# Patient Record
Sex: FEMALE | Race: WHITE | Employment: OTHER | URBAN - METROPOLITAN AREA
[De-identification: names, ages, dates, MRNs, and addresses within clinical notes are randomized per-mention and may not be internally consistent; named-entity substitution may affect disease eponyms.]

---

## 2017-04-10 RX ORDER — LOSARTAN POTASSIUM AND HYDROCHLOROTHIAZIDE 25; 100 MG/1; MG/1
TABLET ORAL
Qty: 90 TAB | Refills: 0 | Status: SHIPPED | OUTPATIENT
Start: 2017-04-10 | End: 2017-04-21

## 2017-04-10 RX ORDER — AMLODIPINE BESYLATE 2.5 MG/1
TABLET ORAL
Qty: 90 TAB | Refills: 0 | Status: SHIPPED | OUTPATIENT
Start: 2017-04-10 | End: 2017-04-21 | Stop reason: SDUPTHER

## 2017-04-21 ENCOUNTER — OFFICE VISIT (OUTPATIENT)
Dept: INTERNAL MEDICINE CLINIC | Age: 67
End: 2017-04-21

## 2017-04-21 VITALS
WEIGHT: 166.6 LBS | OXYGEN SATURATION: 99 % | DIASTOLIC BLOOD PRESSURE: 63 MMHG | HEART RATE: 74 BPM | HEIGHT: 63 IN | SYSTOLIC BLOOD PRESSURE: 130 MMHG | RESPIRATION RATE: 18 BRPM | BODY MASS INDEX: 29.52 KG/M2 | TEMPERATURE: 97.9 F

## 2017-04-21 DIAGNOSIS — J45.901 UNSPECIFIED ASTHMA, WITH EXACERBATION: ICD-10-CM

## 2017-04-21 DIAGNOSIS — Z00.00 PHYSICAL EXAM, ANNUAL: Primary | ICD-10-CM

## 2017-04-21 RX ORDER — LOSARTAN POTASSIUM AND HYDROCHLOROTHIAZIDE 25; 100 MG/1; MG/1
TABLET ORAL
Qty: 30 TAB | Refills: 6 | Status: CANCELLED | OUTPATIENT
Start: 2017-04-21

## 2017-04-21 RX ORDER — MONTELUKAST SODIUM 10 MG/1
TABLET ORAL
Qty: 90 TAB | Refills: 3 | Status: SHIPPED | OUTPATIENT
Start: 2017-04-21 | End: 2018-04-23 | Stop reason: SDUPTHER

## 2017-04-21 RX ORDER — ALBUTEROL SULFATE 90 UG/1
AEROSOL, METERED RESPIRATORY (INHALATION)
Qty: 3 INHALER | Refills: 3 | Status: SHIPPED | OUTPATIENT
Start: 2017-04-21 | End: 2018-01-05 | Stop reason: SDUPTHER

## 2017-04-21 RX ORDER — LOSARTAN POTASSIUM AND HYDROCHLOROTHIAZIDE 25; 100 MG/1; MG/1
TABLET ORAL
Qty: 90 TAB | Refills: 1 | Status: SHIPPED | OUTPATIENT
Start: 2017-04-21 | End: 2017-10-23

## 2017-04-21 RX ORDER — AMLODIPINE BESYLATE 2.5 MG/1
TABLET ORAL
Qty: 90 TAB | Refills: 1 | Status: SHIPPED | OUTPATIENT
Start: 2017-04-21 | End: 2018-04-23 | Stop reason: SDUPTHER

## 2017-04-21 RX ORDER — ALBUTEROL SULFATE 0.83 MG/ML
2.5 SOLUTION RESPIRATORY (INHALATION)
Qty: 1 PACKAGE | Refills: 11 | Status: SHIPPED | OUTPATIENT
Start: 2017-04-21 | End: 2020-12-04 | Stop reason: SDUPTHER

## 2017-04-21 NOTE — MR AVS SNAPSHOT
Visit Information Date & Time Provider Department Dept. Phone Encounter #  
 4/21/2017  2:30 PM Farhan Bojorquez, [address] [phone] [id number] Follow-up Instructions Return in about 6 months (around 10/21/2017). Upcoming Health Maintenance Date Due OSTEOPOROSIS SCREENING (DEXA) 9/13/2016 MEDICARE YEARLY EXAM 9/13/2016 Pneumococcal 65+ Low/Medium Risk (2 of 2 - PPSV23) 10/17/2017 BREAST CANCER SCRN MAMMOGRAM 10/28/2017 GLAUCOMA SCREENING Q2Y 10/24/2018 DTaP/Tdap/Td series (2 - Td) 5/13/2023 COLONOSCOPY 12/22/2026 Allergies as of 4/21/2017  Review Complete On: 4/21/2017 By: Milan Phoenix Severity Noted Reaction Type Reactions Acetylcarnitine Medium 04/29/2016    Swelling Ace Inhibitors  10/10/2012   Side Effect Cough Aleve [Naproxen Sodium]  12/09/2009    Hives Daypro [Oxaprozin]  12/09/2009    Rash Effexor [Venlafaxine]  06/27/2011    Other (comments) Abdominal pain Prednisone  10/26/2010    Swelling Face swelling ONLY with large dosage Shellfish Containing Products  12/09/2009    Swelling Throat and tounge Current Immunizations  Reviewed on 10/22/2013 Name Date Influenza Vaccine 10/17/2016, 10/18/2015, 10/20/2013 Influenza Vaccine PF 9/16/2014 Influenza Vaccine Split 10/10/2011 Influenza Vaccine Whole 10/11/2010 Pneumococcal Conjugate (PCV-13) 10/17/2016 Pneumococcal Polysaccharide (PPSV-23) 1/1/2010 Pneumococcal Vaccine (Unspecified Type) 10/26/2010 Tdap 5/13/2013 Zoster Vaccine, Live 9/25/2015 Not reviewed this visit You Were Diagnosed With   
  
 Codes Comments Physical exam, annual    -  Primary ICD-10-CM: Z00.00 ICD-9-CM: V70.0 Unspecified asthma, with exacerbation     ICD-10-CM: J45.901 ICD-9-CM: 252.64 Vitals BP Pulse Temp Resp Height(growth percentile) Weight(growth percentile) 130/63 (BP 1 Location: Right arm, BP Patient Position: Sitting) 74 97.9 °F (36.6 °C) (Oral) 18 5' 2.5\" (1.588 m) 166 lb 9.6 oz (75.6 kg) SpO2 BMI OB Status Smoking Status 99% 29.99 kg/m2 Postmenopausal Never Smoker BMI and BSA Data Body Mass Index Body Surface Area  
 29.99 kg/m 2 1.83 m 2 Preferred Pharmacy Pharmacy Name Phone [facility] [address] [phone] Your Updated Medication List  
  
   
This list is accurate as of: 4/21/17  3:22 PM.  Always use your most recent med list.  
  
  
  
  
 * albuterol 90 mcg/actuation inhaler Commonly known as:  PROAIR HFA INHALE 2 PUFFS BY MOUTH EVERY 4 HOURS AS NEEDED FOR WHEEZING  
  
 * albuterol 2.5 mg /3 mL (0.083 %) nebulizer solution Commonly known as:  PROVENTIL VENTOLIN  
3 mL by Nebulization route every four (4) hours as needed for Wheezing. amLODIPine 2.5 mg tablet Commonly known as:  NORVASC  
take 1 tablet by mouth once daily * losartan-hydroCHLOROthiazide 100-25 mg per tablet Commonly known as:  HYZAAR  
take 1 tablet by mouth daily * losartan-hydroCHLOROthiazide 100-25 mg per tablet Commonly known as:  HYZAAR  
take 1 tablet by mouth once daily  
  
 mometasone 220 mcg (60 doses) inhaler Commonly known as:  ASMANEX TWISTHALER  
1 cap by inhalation daily. May use 1 cap bid during asthma flares. montelukast 10 mg tablet Commonly known as:  SINGULAIR  
take 1 tablet by mouth once daily Nebulizer & Compressor machine 1 Each by Does Not Apply route every four (4) hours as needed (wheezing). * Notice: This list has 4 medication(s) that are the same as other medications prescribed for you. Read the directions carefully, and ask your doctor or other care provider to review them with you. Prescriptions Sent to Pharmacy  Refills  
 mometasone (ASMANEX TWISTHALER) 220 mcg (60 doses) inhaler 6  
 Si cap by inhalation daily. May use 1 cap bid during asthma flares. Class: Normal  
 Pharmacy: [address] Ph #: [phone]  
 amLODIPine (NORVASC) 2.5 mg tablet 1 Sig: take 1 tablet by mouth once daily Class: Normal  
 Pharmacy: [address] [place] [address] Ph #: [phone]  
 losartan-hydroCHLOROthiazide (HYZAAR) 100-25 mg per tablet 1 Sig: take 1 tablet by mouth once daily Class: Normal  
 Pharmacy: [address] [place] [address] Ph #: [phone]  
 albuterol (PROAIR HFA) 90 mcg/actuation inhaler 3 Sig: INHALE 2 PUFFS BY MOUTH EVERY 4 HOURS AS NEEDED FOR WHEEZING Class: Normal  
 Pharmacy: [address] [place] [address] Ph #: [phone]  
 albuterol (PROVENTIL VENTOLIN) 2.5 mg /3 mL (0.083 %) nebulizer solution 11 Sig: 3 mL by Nebulization route every four (4) hours as needed for Wheezing. Class: Normal  
 Pharmacy: [address] Ph #: [phone] Route: Nebulization  
 montelukast (SINGULAIR) 10 mg tablet 3 Sig: take 1 tablet by mouth once daily Class: Normal  
 Pharmacy: [address] Ph #: [phone] We Performed the Following CBC W/O DIFF [90459 CPT(R)] HEMOGLOBIN A1C WITH EAG [92631 CPT(R)] LIPID PANEL [64333 CPT(R)] METABOLIC PANEL, COMPREHENSIVE [05654 CPT(R)] TSH 3RD GENERATION [84961 CPT(R)] VITAMIN D, 25 HYDROXY [id number] CPT(R)] Follow-up Instructions Return in about 6 months (around 10/21/2017). To-Do List   
 2017 Imaging:  DEXA BONE DENSITY STUDY AXIAL   
  
 2017 Imaging:  JAVIER MAMMO BI SCREENING INCL CAD Referral Information Referral ID Referred By Referred To [id number] [facility] Not Available Visits Status Start Date End Date 1 New Request 4/21/17 4/21/18 If your referral has a status of pending review or denied, additional information will be sent to support the outcome of this decision. Patient Instructions Fasting labs Schedule mammogram and dexa test 
 
  
Introducing \[facility]\"" & [facility] SERVICES! Dear Myra Hood: Thank you for requesting a [employer] account. Our records indicate that you already have an active [employer] account. You can access your account anytime at [email or web address] Did you know that you can access your hospital and ER discharge instructions at any time in [employer]? You can also review all of your test results from your hospital stay or ER visit. Additional Information If you have questions, please visit the Frequently Asked Questions section of the [employer] website at [email or web address] Remember, [employer] is NOT to be used for urgent needs. For medical emergencies, dial 911. Now available from your iPhone and Android! Please provide this summary of care documentation to your next provider. Your primary care clinician is listed as Jovita Lombard. If you have any questions after today's visit, please call [phone].

## 2017-04-21 NOTE — PROGRESS NOTES
HISTORY OF PRESENT ILLNESS  Saul Cronin is a 73 y.o. female. HPI   Last here 10/31/16.  Pt is here to f/u on chronic conditions    BP today is 130/63  BP at home running around 130s/83-85  Continues losartan-HCTZ and norvasc daily     Reviewed last labs 10/16  Repeat labs fasting next week     Wt is up 2 lbs since last visit- overall stable  Her weight is within overweight ranges  Pt walks 30 minutes before work each morning   Discussed diet and weight loss     Pt is following with Dr. Fadumo Cooper (ortho) for her hand pain   She last saw him 2/08/17 and got injections for this pain      Continues asmanex and singular daily  No longer taking zyrtec- now using allegra which works best  She has albuterol to use prn for breathing- stable    Reviewed colonoscopy report 12/22/16 per Dr. Sourav Vazquez: one polyp, lipoma, repeat 3 years      Pt will be retiring June 2017     PREVENTIVE:    Colonoscopy: 12/22/16, Dr. Sourav Vazquez, repeat 3 years  Pap: Dr. Christie Nair, 10/28/15  Mammogram: 10/28/15 negative, due, ordered, will schedule  DEXA: due, discussed, ordered, will schedule  Tdap: 5/13/2013  Pneumovax: 10/26/2010, due 10/17  Gzleejm30: 10/18/2016  Zostavax: 9/25/2015  Flu shot: 10/18/2016  A1c: 4/16 6.1, 10/16 6.2  Eye exam: Dr. Jonathon Hatch, 11/16, floaters  Lipids: 4/16 , 4/17 ordered           Patient Active Problem List    Diagnosis Date Noted    Cyst of tendon sheath 04/09/2012    Depression, reactive 06/27/2011    HTN (hypertension) 12/14/2009    Asthma 12/14/2009    Allergic rhinitis 12/14/2009     Current Outpatient Prescriptions   Medication Sig Dispense Refill    amLODIPine (NORVASC) 2.5 mg tablet take 1 tablet by mouth once daily 90 Tab 0    montelukast (SINGULAIR) 10 mg tablet take 1 tablet by mouth once daily 90 Tab 0    albuterol (PROAIR HFA) 90 mcg/actuation inhaler INHALE 2 PUFFS BY MOUTH EVERY 4 HOURS AS NEEDED FOR WHEEZING 3 Inhaler 3    losartan-hydrochlorothiazide (HYZAAR) 100-25 mg per tablet take 1 tablet by mouth daily 30 Tab 6    mometasone (ASMANEX TWISTHALER) 220 mcg (60 doses) inhaler 1 cap by inhalation daily. May use 1 cap bid during asthma flares. 60 Cap 5    Nebulizer & Compressor machine 1 Each by Does Not Apply route every four (4) hours as needed (wheezing). 1 Each 0    losartan-hydroCHLOROthiazide (HYZAAR) 100-25 mg per tablet take 1 tablet by mouth once daily 90 Tab 0    cetirizine (ZYRTEC) 10 mg tablet Take  by mouth.  albuterol (PROVENTIL VENTOLIN) 2.5 mg /3 mL (0.083 %) nebulizer solution 3 mL by Nebulization route every four (4) hours as needed for Wheezing. 1 Package 11     Past Surgical History:   Procedure Laterality Date    HX ORTHOPAEDIC      back      Lab Results  Component Value Date/Time   WBC 8.7 10/31/2016 03:11 PM   HGB 10.8 10/31/2016 03:11 PM   HCT 33.0 10/31/2016 03:11 PM   PLATELET 248 63/23/4439 03:11 PM   MCV 89 10/31/2016 03:11 PM       Lab Results  Component Value Date/Time   Cholesterol, total 175 04/29/2016 02:21 PM   HDL Cholesterol 56 04/29/2016 02:21 PM   LDL, calculated 105 04/29/2016 02:21 PM   Triglyceride 71 04/29/2016 02:21 PM       Lab Results  Component Value Date/Time   GFR est AA 73 10/31/2016 03:11 PM   GFR est non-AA 63 10/31/2016 03:11 PM   Creatinine 0.95 10/31/2016 03:11 PM   BUN 29 10/31/2016 03:11 PM   Sodium 142 10/31/2016 03:11 PM   Potassium 3.6 10/31/2016 03:11 PM   Chloride 100 10/31/2016 03:11 PM   CO2 28 10/31/2016 03:11 PM         Review of Systems   Respiratory: Negative for shortness of breath. Cardiovascular: Negative for chest pain. Physical Exam   Constitutional: She is oriented to person, place, and time. She appears well-developed and well-nourished. No distress. HENT:   Head: Normocephalic and atraumatic. Right Ear: External ear normal.   Left Ear: External ear normal.   Mouth/Throat: Oropharynx is clear and moist. No oropharyngeal exudate.    Eyes: Conjunctivae and EOM are normal. Right eye exhibits no discharge. Left eye exhibits no discharge. Neck: Normal range of motion. Neck supple. No carotid bruits     Cardiovascular: Normal rate, regular rhythm and normal heart sounds. Exam reveals no gallop and no friction rub. No murmur heard. Pulmonary/Chest: Effort normal and breath sounds normal. No respiratory distress. She has no wheezes. She has no rales. She exhibits no tenderness. Abdominal: Soft. She exhibits no distension and no mass. There is no tenderness. There is no rebound and no guarding. Musculoskeletal: Normal range of motion. She exhibits no edema, tenderness or deformity. Lymphadenopathy:     She has no cervical adenopathy. Neurological: She is alert and oriented to person, place, and time. Coordination normal.   Skin: Skin is warm and dry. No rash noted. She is not diaphoretic. No erythema. No pallor. Psychiatric: She has a normal mood and affect. Her behavior is normal.       ASSESSMENT and PLAN    ICD-10-CM ICD-9-CM    1. Physical exam, annual    Wt stable from last year and down 10 lbs from previous years. She exercises routinely. She is due for mammogram, and DEXA, she will schedule, immunizations UTD, will do pneumovax in the fall, eye exam due in the fall, annual labs due and ordered, colonoscopy UTD. H65.54 P33.5 METABOLIC PANEL, COMPREHENSIVE      CBC W/O DIFF      HEMOGLOBIN A1C WITH EAG      LIPID PANEL      TSH 3RD GENERATION      VITAMIN D, 25 HYDROXY      JAVIER MAMMO BI SCREENING INCL CAD      DEXA BONE DENSITY STUDY AXIAL   2. Unspecified asthma, with exacerbation    Stable with asmanex J45.901 493.92 albuterol (PROVENTIL VENTOLIN) 2.5 mg /3 mL (0.083 %) nebulizer solution          Written by Sarita Camejo, as dictated by Ricardo Malcolm MD.    Current diagnosis and concerns discussed with pt at length.  Understands risks and benefits or current treatment plan and medications and accepts the treatment and medication with any possible risks.   Pt asks appropriate questions which were answered.   Pt instructed to call with any concerns or problems. This note will not be viewable in [address]

## 2017-04-25 RX ORDER — FLUTICASONE PROPIONATE 220 UG/1
1 AEROSOL, METERED RESPIRATORY (INHALATION) 2 TIMES DAILY
Qty: 1 INHALER | Refills: 3 | Status: SHIPPED | OUTPATIENT
Start: 2017-04-25 | End: 2018-04-23 | Stop reason: SDUPTHER

## 2017-07-01 RX ORDER — SCOLOPAMINE TRANSDERMAL SYSTEM 1 MG/1
1 PATCH, EXTENDED RELEASE TRANSDERMAL
Qty: 4 PATCH | Refills: 0 | Status: SHIPPED | OUTPATIENT
Start: 2017-07-01 | End: 2017-10-23

## 2017-10-23 ENCOUNTER — OFFICE VISIT (OUTPATIENT)
Dept: INTERNAL MEDICINE CLINIC | Age: 67
End: 2017-10-23

## 2017-10-23 VITALS
WEIGHT: 170 LBS | RESPIRATION RATE: 16 BRPM | BODY MASS INDEX: 30.12 KG/M2 | SYSTOLIC BLOOD PRESSURE: 138 MMHG | HEIGHT: 63 IN | TEMPERATURE: 97.8 F | OXYGEN SATURATION: 97 % | DIASTOLIC BLOOD PRESSURE: 70 MMHG | HEART RATE: 57 BPM

## 2017-10-23 DIAGNOSIS — R01.1 MURMUR, CARDIAC: ICD-10-CM

## 2017-10-23 DIAGNOSIS — Z00.00 WELCOME TO MEDICARE PREVENTIVE VISIT: ICD-10-CM

## 2017-10-23 DIAGNOSIS — I10 ESSENTIAL HYPERTENSION: Primary | ICD-10-CM

## 2017-10-23 DIAGNOSIS — J45.40 MODERATE PERSISTENT ASTHMA WITHOUT COMPLICATION: ICD-10-CM

## 2017-10-23 DIAGNOSIS — Z23 ENCOUNTER FOR IMMUNIZATION: ICD-10-CM

## 2017-10-23 DIAGNOSIS — E66.9 OBESITY (BMI 30.0-34.9): ICD-10-CM

## 2017-10-23 NOTE — PATIENT INSTRUCTIONS
Medicare Part B Preventive Services Guidelines/Limitations Date last completed and Frequency Due Date   Bone Mass Measurement  (age 72 & older, biennial) Requires diagnosis related to osteoporosis or estrogen deficiency. Biennial benefit unless patient has history of long-term glucocorticoid tx or baseline is needed because initial test was by other method Completed Recommended every 2 years Due    Cardiovascular Screening Blood Tests (every 5 years)  Total cholesterol, HDL, Triglycerides Order as a panel if possible Completed 10/16  Recommended annually now   Colorectal Cancer Screening  -Fecal occult blood test (annual)  -Flexible sigmoidoscopy (5y)  -Screening colonoscopy (10y)  -Barium Enema  Completed  12/22/16  Recommended every  years     diego 12/19   Counseling to Prevent Tobacco Use (up to 8 sessions per year)  - Counseling greater than 3 and up to 10 minutes  - Counseling greater than 10 minutes Patients must be asymptomatic of tobacco-related conditions to receive as preventive service n/a n/a   Diabetes Screening Tests (at least every 3 years, Medicare covers annually or at 6-month intervals for prediabetic patients)    Fasting blood sugar (FBS) or glucose tolerance test (GTT) Patient must be diagnosed with one of the following:  -Hypertension, Dyslipidemia, obesity, previous impaired FBS or GTT  Or any two of the following: overweight, FH of diabetes, age ? 72, history of gestational diabetes, birth of baby weighing more than 9 pounds Completed: 10/16    Recommended every 3 years for non-diabetics    Recommended every 3-6 months for Pre-Diabetics and Diabetics Due now   Diabetes Self-Management Training (DSMT) (no USPSTF recommendation) Requires referral by treating physician for patient with diabetes or renal disease. 10 hours of initial DSMT session of no less than 30 minutes each in a continuous 12-month period. 2 hours of follow-up DSMT in subsequent years.  n/a n/a   Glaucoma Screening (no USPSTF recommendation) Diabetes mellitus, family history, , age 48 or over,  American, age 72 or over Completed  11/16  Recommended annually Due 11/17   Human Immunodeficiency Virus (HIV) Screening (annually for increased risk patients)  HIV-1 and HIV-2 by EIA, LOGAN, rapid antibody test, or oral mucosa transudate Patient must be at increased risk for HIV infection per USPSTF guidelines or pregnant. Tests covered annually for patients at increased risk. Pregnant patients may receive up to 3 test during pregnancy. n/a    Medical Nutrition Therapy (MNT) (for diabetes or renal disease not recommended schedule) Requires referral by treating physician for patient with diabetes or renal disease. Can be provided in same year as diabetes self-management training (DSMT), and CMS recommends medical nutrition therapy take place after DSMT. Up to 3 hours for initial year and 2 hours in subsequent years. n/a    Prostate Cancer Screening (annually up to age 76)  - Digital rectal exam (DALLAS)  - Prostate specific antigen (PSA) Annually (age 48 or over), DALLAS not paid separately when covered E/M service is provided on same date n/a n/a   Seasonal Influenza Vaccination (annually)  Completed 10/17  Recommended Annually 10/18   Pneumococcal Vaccination (once after 72)  Pneumococcal 21 -today   Recommended once over the age of 72    Prevnar 15 -10/16 Recommended once over the age of 72 Complete        Complted   Hepatitis B Vaccinations (if medium/high risk) Medium/high risk factors:  End-stage renal disease,  Hemophiliacs who received Factor VIII or IX concentrates, Clients of institutions for the mentally retarded, Persons who live in the same house as a HepB virus carrier, Homosexual men, Illicit injectable drug abusers. n/a n/a   Screening Mammography (biennial age 54-69)?  Annually (age 36 or over) Completed 8/17   Due  8/18   Screening Pap Tests and Pelvic Examination (up to age 79 and after 79 if unknown history or abnormal study last 10 years) Every 24 months except high risk Completed 8/17 Due 8/19   Ultrasound Screening for Abdominal Aortic Aneurysm (AAA) (once) Patient must be referred through [facility] and not have had a screening for abdominal aortic aneurysm before under Medicare. Limited to patients who meet one of the following criteria:  - Men who are 73-68 years old and have smoked more than 100 cigarettes in their lifetime.  -Anyone with a FH of AAA  -Anyone recommended for screening by USPSTF n/a n/a   Family Practice Management 2011    Please bring in a copy of your advanced directive to your next office visit so we can have a copy on file. Medicare Wellness Visit, Female    The best way to live healthy is to have a healthy lifestyle by eating a well-balanced diet, exercising regularly, limiting alcohol and stopping smoking. Regular physical exams and screening tests are another way to keep healthy. Preventive exams provided by your health care provider can find health problems before they become diseases or illnesses. Preventive services including immunizations, screening tests, monitoring and exams can help you take care of your own health. All people over age 72 should have a pneumovax  and and a prevnar shot to prevent pneumonia. These are once in a lifetime unless you and your provider decide differently. All people over 65 should have a yearly flu shot and a tetanus vaccine every 10 years. A bone mass density to screen for osteoporosis or thinning of the bones should be done every 2 years after 65. Screening for diabetes mellitus with a blood sugar test should be done every year.     Glaucoma is a disease of the eye due to increased ocular pressure that can lead to blindness and it should be done every year by an eye professional.    Cardiovascular screening tests that check for elevated lipids (fatty part of blood) which can lead to heart disease and strokes should be done every 5 years. Colorectal screening that evaluates for blood or polyps in your colon should be done yearly as a stool test or every five years as a flexible sigmoidoscope or every 10 years as a colonoscopy up to age 76. Breast cancer screening with a mammogram is recommended biennially  for women age 54-69. Screening for cervical cancer with a pap smear and pelvic exam is recommended for women after age 72 years every 2 years up to age 79 or when the provider and patient decide to stop. If there is a history of cervical abnormalities or other increased risk for cancer then the test is recommended yearly. Hepatitis C screening is also recommended for anyone born between [birth year] through [address]. A shingles vaccine is also recommended once in a lifetime after age 61. Your Medicare Wellness Exam is recommended annually.     Here is a list of your current Health Maintenance items with a due date:  Health Maintenance Due   Topic Date Due    Bone Density Screening  09/13/2016    Annual Well Visit  09/13/2016    Flu Vaccine  08/01/2017    Pneumococcal Vaccine (2 of 2 - PPSV23) 10/17/2017    Breast Cancer Screening  10/28/2017

## 2017-10-23 NOTE — MR AVS SNAPSHOT
Visit Information Date & Time Provider Department Dept. Phone Encounter #  
 10/23/2017  9:15 AM Kim Hdez, [address] [id number] Follow-up Instructions Return in about 6 months (around 4/23/2018). Upcoming Health Maintenance Date Due OSTEOPOROSIS SCREENING (DEXA) 9/13/2016 MEDICARE YEARLY EXAM 9/13/2016 INFLUENZA AGE 9 TO ADULT 8/1/2017 Pneumococcal 65+ Low/Medium Risk (2 of 2 - PPSV23) 10/17/2017 BREAST CANCER SCRN MAMMOGRAM 10/28/2017 GLAUCOMA SCREENING Q2Y 10/24/2018 DTaP/Tdap/Td series (2 - Td) 5/13/2023 COLONOSCOPY 12/22/2026 Allergies as of 10/23/2017  Review Complete On: 10/23/2017 By: Kim Hdez MD  
  
 Severity Noted Reaction Type Reactions Acetylcarnitine Medium 04/29/2016    Swelling Ace Inhibitors  10/10/2012   Side Effect Cough Aleve [Naproxen Sodium]  12/09/2009    Hives Daypro [Oxaprozin]  12/09/2009    Rash Effexor [Venlafaxine]  06/27/2011    Other (comments) Abdominal pain Prednisone  10/26/2010    Swelling Face swelling ONLY with large dosage Shellfish Containing Products  12/09/2009    Swelling Throat and tounge Current Immunizations  Reviewed on 10/22/2013 Name Date Influenza Vaccine 10/17/2016, 10/18/2015, 10/20/2013 Influenza Vaccine PF 9/16/2014 Influenza Vaccine Split 10/10/2011 Influenza Vaccine Whole 10/11/2010 Pneumococcal Conjugate (PCV-13) 10/17/2016 Pneumococcal Polysaccharide (PPSV-23) 1/1/2010 Tdap 5/13/2013 ZZZ-RETIRED (DO NOT USE) Pneumococcal Vaccine (Unspecified Type) 10/26/2010 Zoster Vaccine, Live 9/25/2015 Not reviewed this visit You Were Diagnosed With   
  
 Codes Comments Essential hypertension    -  Primary ICD-10-CM: I10 
ICD-9-CM: 401.9 Welcome to Medicare preventive visit     ICD-10-CM: Z00.00 ICD-9-CM: V70.0 Moderate persistent asthma without complication     VQS-90-EM: J45.40 ICD-9-CM: 493.90 Obesity (BMI 30.0-34.9)     ICD-10-CM: M53.1 ICD-9-CM: 278.00 Vitals BP Pulse Temp Resp Height(growth percentile) Weight(growth percentile) 138/70 (BP 1 Location: Left arm, BP Patient Position: Sitting) (!) 57 97.8 °F (36.6 °C) (Oral) 16 5' 2.5\" (1.588 m) 170 lb (77.1 kg) SpO2 BMI OB Status Smoking Status 97% 30.6 kg/m2 Postmenopausal Never Smoker Vitals History BMI and BSA Data Body Mass Index Body Surface Area  
 30.6 kg/m 2 1.84 m 2 Preferred Pharmacy Pharmacy Name Phone [facility] [address] [place] [address] [phone] Your Updated Medication List  
  
   
This list is accurate as of: 10/23/17  9:33 AM.  Always use your most recent med list.  
  
  
  
  
 * albuterol 90 mcg/actuation inhaler Commonly known as:  PROAIR HFA INHALE 2 PUFFS BY MOUTH EVERY 4 HOURS AS NEEDED FOR WHEEZING  
  
 * albuterol 2.5 mg /3 mL (0.083 %) nebulizer solution Commonly known as:  PROVENTIL VENTOLIN  
3 mL by Nebulization route every four (4) hours as needed for Wheezing. amLODIPine 2.5 mg tablet Commonly known as:  NORVASC  
take 1 tablet by mouth once daily  
  
 fluticasone 220 mcg/actuation inhaler Commonly known as:  FLOVENT HFA Take 1 Puff by inhalation two (2) times a day. losartan-hydroCHLOROthiazide 100-25 mg per tablet Commonly known as:  HYZAAR  
take 1 tablet by mouth daily  
  
 montelukast 10 mg tablet Commonly known as:  SINGULAIR  
take 1 tablet by mouth once daily * Notice: This list has 2 medication(s) that are the same as other medications prescribed for you. Read the directions carefully, and ask your doctor or other care provider to review them with you. We Performed the Following AMB POC EKG ROUTINE W/ 12 LEADS, SCREEN () [ [facility]] Follow-up Instructions Return in about 6 months (around 4/23/2018). Patient Instructions Medicare Part B Preventive Services Guidelines/Limitations Date last completed and Frequency Due Date Bone Mass Measurement 
(age 72 & older, biennial) Requires diagnosis related to osteoporosis or estrogen deficiency. Biennial benefit unless patient has history of long-term glucocorticoid tx or baseline is needed because initial test was by other method Completed Recommended every 2 years Due   
Cardiovascular Screening Blood Tests (every 5 years) Total cholesterol, HDL, Triglycerides Order as a panel if possible Completed 10/16 Recommended annually now Colorectal Cancer Screening 
-Fecal occult blood test (annual) -Flexible sigmoidoscopy (5y) 
-Screening colonoscopy (10y) -Barium Enema  Completed  12/22/16 Recommended every  years  
 
diego 12/19 Counseling to Prevent Tobacco Use (up to 8 sessions per year) - Counseling greater than 3 and up to 10 minutes - Counseling greater than 10 minutes Patients must be asymptomatic of tobacco-related conditions to receive as preventive service n/a n/a Diabetes Screening Tests (at least every 3 years, Medicare covers annually or at 6-month intervals for prediabetic patients) Fasting blood sugar (FBS) or glucose tolerance test (GTT) Patient must be diagnosed with one of the following: 
-Hypertension, Dyslipidemia, obesity, previous impaired FBS or GTT 
Or any two of the following: overweight, FH of diabetes, age ? 72, history of gestational diabetes, birth of baby weighing more than 9 pounds Completed: 10/16 Recommended every 3 years for non-diabetics Recommended every 3-6 months for Pre-Diabetics and Diabetics Due now Diabetes Self-Management Training (DSMT) (no USPSTF recommendation) Requires referral by treating physician for patient with diabetes or renal disease. 10 hours of initial DSMT session of no less than 30 minutes each in a continuous 12-month period. 2 hours of follow-up DSMT in subsequent years.  n/a n/a  
 Glaucoma Screening (no USPSTF recommendation) Diabetes mellitus, family history, , age 48 or over,  American, age 72 or over Completed  11/16 Recommended annually Due 11/17 Human Immunodeficiency Virus (HIV) Screening (annually for increased risk patients) HIV-1 and HIV-2 by EIA, LOGAN, rapid antibody test, or oral mucosa transudate Patient must be at increased risk for HIV infection per USPSTF guidelines or pregnant. Tests covered annually for patients at increased risk. Pregnant patients may receive up to 3 test during pregnancy. n/a Medical Nutrition Therapy (MNT) (for diabetes or renal disease not recommended schedule) Requires referral by treating physician for patient with diabetes or renal disease. Can be provided in same year as diabetes self-management training (DSMT), and CMS recommends medical nutrition therapy take place after DSMT. Up to 3 hours for initial year and 2 hours in subsequent years. n/a Prostate Cancer Screening (annually up to age 76) - Digital rectal exam (DALLAS) - Prostate specific antigen (PSA) Annually (age 48 or over), DALLAS not paid separately when covered E/M service is provided on same date n/a n/a Seasonal Influenza Vaccination (annually)  Completed 10/17 Recommended Annually 10/18 Pneumococcal Vaccination (once after 65)  Pneumococcal 23 -today Recommended once over the age of 72 Prevnar 13 -10/16 Recommended once over the age of 72 Complete Complted Hepatitis B Vaccinations (if medium/high risk) Medium/high risk factors:  End-stage renal disease, Hemophiliacs who received Factor VIII or IX concentrates, Clients of institutions for the mentally retarded, Persons who live in the same house as a HepB virus carrier, Homosexual men, Illicit injectable drug abusers. n/a n/a Screening Mammography (biennial age 54-69)? Annually (age 36 or over) Completed 8/17 Due  8/18 Screening Pap Tests and Pelvic Examination (up to age 79 and after 79 if unknown history or abnormal study last 10 years) Every 24 months except high risk Completed 8/17 Due 8/19 Ultrasound Screening for Abdominal Aortic Aneurysm (AAA) (once) Patient must be referred through IPPE and not have had a screening for abdominal aortic aneurysm before under Medicare. Limited to patients who meet one of the following criteria: 
- Men who are 73-68 years old and have smoked more than 100 cigarettes in their lifetime. 
-Anyone with a FH of AAA 
-Anyone recommended for screening by USPSTF n/a n/a Family Practice Management 2011 Please bring in a copy of your advanced directive to your next office visit so we can have a copy on file. Medicare Wellness Visit, Female The best way to live healthy is to have a healthy lifestyle by eating a well-balanced diet, exercising regularly, limiting alcohol and stopping smoking. Regular physical exams and screening tests are another way to keep healthy. Preventive exams provided by your health care provider can find health problems before they become diseases or illnesses. Preventive services including immunizations, screening tests, monitoring and exams can help you take care of your own health. All people over age 72 should have a pneumovax  and and a prevnar shot to prevent pneumonia. These are once in a lifetime unless you and your provider decide differently. All people over 65 should have a yearly flu shot and a tetanus vaccine every 10 years. A bone mass density to screen for osteoporosis or thinning of the bones should be done every 2 years after 65. Screening for diabetes mellitus with a blood sugar test should be done every year.  
 
Glaucoma is a disease of the eye due to increased ocular pressure that can lead to blindness and it should be done every year by an eye professional. 
 
 Cardiovascular screening tests that check for elevated lipids (fatty part of blood) which can lead to heart disease and strokes should be done every 5 years. Colorectal screening that evaluates for blood or polyps in your colon should be done yearly as a stool test or every five years as a flexible sigmoidoscope or every 10 years as a colonoscopy up to age 76. Breast cancer screening with a mammogram is recommended biennially  for women age 54-69. Screening for cervical cancer with a pap smear and pelvic exam is recommended for women after age 72 years every 2 years up to age 79 or when the provider and patient decide to stop. If there is a history of cervical abnormalities or other increased risk for cancer then the test is recommended yearly. Hepatitis C screening is also recommended for anyone born between 80 through [address]. A shingles vaccine is also recommended once in a lifetime after age 61. Your Medicare Wellness Exam is recommended annually. Here is a list of your current Health Maintenance items with a due date: 
Health Maintenance Due Topic Date Due  Bone Density Screening  09/13/2016 Farzaneh Price Annual Well Visit  09/13/2016  Flu Vaccine  08/01/2017  Pneumococcal Vaccine (2 of 2 - PPSV23) 10/17/2017  Breast Cancer Screening  10/28/2017 [facility]! Dear Ely Matias: Thank you for requesting a [facility] account. Our records indicate that you already have an active [facility] account. You can access your account anytime at [email or web address] Did you know that you can access your hospital and ER discharge instructions at any time in [facility]? You can also review all of your test results from your hospital stay or ER visit. Additional Information If you have questions, please visit the Frequently Asked Questions section of the [facility] website at [email or web address] Remember, [facility] is NOT to be used for urgent needs. For medical emergencies, dial 911. Now available from your iPhone and Android! Please provide this summary of care documentation to your next provider. Your primary care clinician is listed as Abel Green. If you have any questions after today's visit, please call [phone].

## 2017-10-23 NOTE — PROGRESS NOTES
This is a \"Welcome to [employer]"  Initial Preventive Physical Examination (IPPE) providing Personalized Prevention Plan Services (Performed in the first 12 months of enrollment)    I have reviewed the patient's medical history in detail and updated the computerized patient record. History     Past Medical History:   Diagnosis Date    Ankle sprain 03/2014    right    Asthma 12/14/2009    HTN (hypertension) 12/14/2009    Iron deficiency anemia 12/14/2009      Past Surgical History:   Procedure Laterality Date    HX ORTHOPAEDIC      back     Current Outpatient Prescriptions   Medication Sig Dispense Refill    fluticasone (FLOVENT HFA) 220 mcg/actuation inhaler Take 1 Puff by inhalation two (2) times a day. 1 Inhaler 3    amLODIPine (NORVASC) 2.5 mg tablet take 1 tablet by mouth once daily 90 Tab 1    montelukast (SINGULAIR) 10 mg tablet take 1 tablet by mouth once daily 90 Tab 3    losartan-hydrochlorothiazide (HYZAAR) 100-25 mg per tablet take 1 tablet by mouth daily 30 Tab 6    albuterol (PROAIR HFA) 90 mcg/actuation inhaler INHALE 2 PUFFS BY MOUTH EVERY 4 HOURS AS NEEDED FOR WHEEZING 3 Inhaler 3    albuterol (PROVENTIL VENTOLIN) 2.5 mg /3 mL (0.083 %) nebulizer solution 3 mL by Nebulization route every four (4) hours as needed for Wheezing. 1 Package 11     Allergies   Allergen Reactions    Acetylcarnitine Swelling    Ace Inhibitors Cough    Aleve [Naproxen Sodium] Hives    Daypro [Oxaprozin] Rash    Effexor [Venlafaxine] Other (comments)     Abdominal pain    Prednisone Swelling     Face swelling ONLY with large dosage    Shellfish Containing Products Swelling     Throat and tounge     History reviewed. No pertinent family history.   Social History   Substance Use Topics    Smoking status: Never Smoker    Smokeless tobacco: Never Used    Alcohol use 0.5 oz/week     1 Glasses of wine per week      Comment: occasionally     Diet, Lifestyle: No special diet  Does not drink sodas, drinks water, trying to decrease sweets, has home cooked meals  Exercise level: moderately active  Walks 30min 3 times week  Depression Risk Screen     PHQ over the last two weeks 10/23/2017   Little interest or pleasure in doing things Not at all   Feeling down, depressed or hopeless Not at all   Total Score PHQ 2 0   no depression  Alcohol Risk Screen   You do not drink alcohol or very rarely. On any occasion in the past three months you have had more than 3 drinks containing alcohol. Up to one glass wine per night    Functional Ability and Level of Safety   Hearing Loss  Hearing is good. Vision Screening  Vision is good. Visual Acuity Screening    Right eye Left eye Both eyes   Without correction: 20/50 20/40 20/30   With correction:            Activities of Daily Living  The home contains: no safety equipment. Patient does total self care    Fall Risk Screen  Fall Risk Assessment, last 12 mths 10/23/2017   Able to walk? Yes   Fall in past 12 months? No   no falls    Abuse Screen  Patient is not abused  Lives with , happy same  Screening EKG   EKG order placed: Yes, nsr completed today     Patient Care Team   Patient Care Team:  Juana Hardwick MD as PCP - General (Internal Medicine)  Inga Hahn MD (Ophthalmology)  Shahnaz Starr MD (Gastroenterology)  Lavon Suggs MD (Obstetrics & Gynecology)  Aleksandra Dominguez MD (Orthopedic Surgery)   updated  End of Life Planning   Advanced care planning directives were discussed with the patient and /or family/caregiver. Assessment/Plan   Education and counseling provided:  Are appropriate based on today's review and evaluation  End-of-Life planning (with patient's consent)  Pneumococcal Vaccine  Screening Pap and pelvic (covered once every 2 years)  Bone mass measurement (DEXA)  Screening for glaucoma  Diabetes screening test    Diagnoses and all orders for this visit:    1.  Welcome to Medicare preventive visit  -     AMB POC EKG Screen ([id number]) (Only Orderable in first 12 months of Medicare)      Health Maintenance Due   Topic Date Due    OSTEOPOROSIS SCREENING (DEXA)  09/13/2016    MEDICARE YEARLY EXAM  09/13/2016    INFLUENZA AGE 9 TO ADULT  08/01/2017    Pneumococcal 65+ Low/Medium Risk (2 of 2 - PPSV23) 10/17/2017    BREAST CANCER SCRN MAMMOGRAM  10/28/2017       Discussed with patient about advance medical directive. Provided patient blank AMD and Your Right to Decide Booklet. Requested that if completed to provide a copy of AMD to office. ACP not on file: Began discussion today, SDM is her .  [employer]    Colonoscopy: 12/22/16, Dr. Betsey Marquez, repeat 3 years  Pap: Dr. Angelina Cortez, 8/2017-9/2017, every 2-3 years  Mammogram: Dr. Angelina Cortez, 8/2017-9/2017, negative, will get report  Annual   DEXA: due, discussed, ordered, will schedule  AAA: no FMHX no screen needed    Tdap: 5/13/2013  Pneumovax: 10/23/17  [id number]: 10/18/2016  Zostavax: 9/25/2015  Flu shot: 10/23/17    Eye exam: Dr. Haris Green, 11/16, floaters, due 11/17, will schedule    A1c: 10/16 6.2 due now  Lipids: 4/16   Due now    EKG: 10/23/17 nsr    Medication reconciliation completed by MA and reviewed by me. Medical/surgical/social/family history reviewed and updated by me. Patient provided AVS and preventative screening table. Patient verbalized understanding of all information discussed.

## 2017-10-23 NOTE — PROGRESS NOTES
HISTORY OF PRESENT ILLNESS  Simone Stovre is a 78 y.o. female. HPI   Last here 4/21/17.  Pt is here to f/u on chronic conditions.     BP today is 138/70  Pt c/o some intermittent orthostatic sx  Pt states that her BP was 120s-130s/62 at those times  Discussed these BP readings not being too low  Discussed orthostasis occurring when she stands up too quickly  Discussed not altering her BP meds at this time  Discussed to call clinic if her sx exacerbate   Continues on losartan-HCTZ and norvasc daily   Repeat labs today      Wt is up 4 lbs since last visit  Her weight is within overweight ranges  Pt is no longer walking with her co-worker 30-40 minutes each AM  Pt intermittently walks 30 minutes each AM  Discussed not drinking caloric beverages     Pt follows with Dr. Hunter Conner (ortho) for her BL hand pain   Last visit was 2/08/17   Pt received cortisone injections in her BL thumbs at that time  Pt will f/u with this physician prn  Pt states that she has been having more pain recently  Per pt, a thumbectomy could be completed - pt is not amenable to having this procedure completed      Pt went to [facility] recently  Pt had a cold at that time  Pt is doing well overall       Continues asmanex and singular daily  No longer taking zyrtec- now using allegra which works best  She has albuterol to use prn for breathing- stable    Pt has albuterol to use prn for asthma   Pt also has flovent, but states that this med is in a higher-tier ($360 for a 3 month refill)  Discussed changing this med to a more affordable med  Discussed looking at which meds are similar and less expensive       On exam, pt had a quiet murmur  Discussed murmur becoming louder if the MR becomes more and more leaky  Ordered ECHO today  Will get EKG today    Of note, pt retired in 6/2017    ACP not on file: Began discussion today, SDM is her .      PREVENTIVE:    Colonoscopy: 12/22/16, Dr. Gema Beaulieu, repeat 3 years  Pap: Dr. Christie Sanchez, 8/2017-9/2017, every other year  Mammogram: Dr. Meliza Rivera, 8/2017-9/2017, negative, will get report  DEXA: due, discussed, ordered, will schedule  AAA: no FMHX  Tdap: 5/13/2013  Pneumovax: 10/23/17  [id number]: 10/18/2016  Zostavax: 9/25/2015  Flu shot: 10/23/17  A1c: 4/16 6.1, 10/16 6.2  Eye exam: Dr. Dayanara Hilton, 11/16, floaters, due 11/17, will schedule  Lipids: 4/16    EKG: 10/23/17 nsr    Patient Active Problem List    Diagnosis Date Noted    Cyst of tendon sheath 04/09/2012    Depression, reactive 06/27/2011    HTN (hypertension) 12/14/2009    Asthma 12/14/2009    Allergic rhinitis 12/14/2009     Current Outpatient Prescriptions   Medication Sig Dispense Refill    fluticasone (FLOVENT HFA) 220 mcg/actuation inhaler Take 1 Puff by inhalation two (2) times a day. 1 Inhaler 3    amLODIPine (NORVASC) 2.5 mg tablet take 1 tablet by mouth once daily 90 Tab 1    montelukast (SINGULAIR) 10 mg tablet take 1 tablet by mouth once daily 90 Tab 3    losartan-hydrochlorothiazide (HYZAAR) 100-25 mg per tablet take 1 tablet by mouth daily 30 Tab 6    albuterol (PROAIR HFA) 90 mcg/actuation inhaler INHALE 2 PUFFS BY MOUTH EVERY 4 HOURS AS NEEDED FOR WHEEZING 3 Inhaler 3    albuterol (PROVENTIL VENTOLIN) 2.5 mg /3 mL (0.083 %) nebulizer solution 3 mL by Nebulization route every four (4) hours as needed for Wheezing.  1 Package 11     Past Surgical History:   Procedure Laterality Date    HX ORTHOPAEDIC      back      Lab Results  Component Value Date/Time   WBC 8.7 10/31/2016 03:11 PM   HGB 10.8 10/31/2016 03:11 PM   HCT 33.0 10/31/2016 03:11 PM   PLATELET 059 19/50/1433 03:11 PM   MCV 89 10/31/2016 03:11 PM     Lab Results  Component Value Date/Time   Cholesterol, total 175 04/29/2016 02:21 PM   HDL Cholesterol 56 04/29/2016 02:21 PM   LDL, calculated 105 04/29/2016 02:21 PM   Triglyceride 71 04/29/2016 02:21 PM     Lab Results  Component Value Date/Time   GFR est non-AA 63 10/31/2016 03:11 PM   GFR est AA 73 10/31/2016 03:11 PM   Creatinine 0.95 10/31/2016 03:11 PM   BUN 29 10/31/2016 03:11 PM   Sodium 142 10/31/2016 03:11 PM   Potassium 3.6 10/31/2016 03:11 PM   Chloride 100 10/31/2016 03:11 PM   CO2 28 10/31/2016 03:11 PM          Review of Systems   HENT: Negative for hearing loss. Respiratory: Negative for shortness of breath. Cardiovascular: Negative for chest pain. Musculoskeletal: Negative for falls. Psychiatric/Behavioral: Negative for depression and memory loss. Physical Exam   Constitutional: She is oriented to person, place, and time. She appears well-developed and well-nourished. No distress. HENT:   Head: Normocephalic and atraumatic. Right Ear: External ear normal.   Left Ear: External ear normal.   Mouth/Throat: Oropharynx is clear and moist. No oropharyngeal exudate. Eyes: Conjunctivae and EOM are normal. Pupils are equal, round, and reactive to light. Right eye exhibits no discharge. Left eye exhibits no discharge. No scleral icterus. Neck: Normal range of motion. Neck supple. No carotid bruits     Cardiovascular: Normal rate, regular rhythm and intact distal pulses. Exam reveals no gallop and no friction rub. Murmur (Quiet) heard. Pulmonary/Chest: Effort normal and breath sounds normal. No respiratory distress. She has no wheezes. She has no rales. She exhibits no tenderness. Abdominal: Soft. She exhibits no distension and no mass. There is no tenderness. There is no rebound and no guarding. Musculoskeletal: Normal range of motion. She exhibits no edema, tenderness or deformity. Lymphadenopathy:     She has no cervical adenopathy. Neurological: She is alert and oriented to person, place, and time. Coordination normal.   Skin: Skin is warm and dry. No rash noted. She is not diaphoretic. No erythema. No pallor. Psychiatric: She has a normal mood and affect. Her behavior is normal.       ASSESSMENT and PLAN    ICD-10-CM ICD-9-CM    1.  Essential hypertension    Well-controlled on losartan-HCTZ and norvasc daily, pt reports infrequent orthostatic sx but with nl BP readings, will not decrease BP meds unless sx become more pronounced or BP actually starts to drop   I10 401.9    2. Welcome to Medicare preventive visit Z00.00 V70.0 AMB POC EKG ROUTINE W/ 12 LEADS, SCREEN ()   3. Moderate persistent asthma without complication    Controlled with flovent, uses albuterol infrequently prn, meds have become more expensive since changing to medicare, she will check and see if there is a cheaper alternative    J45.40 493.90    4. Obesity (BMI 30.0-34. 9)    Pt is up 10 lbs, discussed need for w/l, discussed portion control   E66.9 278.00    5. Murmur, cardiac    Will get ECHO for further eval, of note EKG completed today was nsr R01.1 785.2 2D ECHO COMPLETE ADULT (TTE) W OR WO CONTR   6. Encounter for immunization    Pneumovax and flu shot today Z23 V03.89 ADMIN INFLUENZA VIRUS VAC      PNEUMOCOCCAL POLYSACCHARIDE VACCINE, 23-VALENT, ADULT OR IMMUNOSUPPRESSED PT DOSE,        Scribed by Zulma Gottron of [address] [place] [address] [address], as dictated by Dr. Maurice Feng. Current diagnosis and concerns discussed with pt at length. Pt understands risks and benefits or current treatment plan and medications, and accepts the treatment and medication with any possible risks. Pt asks appropriate questions, which were answered. Pt was instructed to call with any concerns or problems. This note will not be viewable in [address]

## 2017-10-24 DIAGNOSIS — D64.9 ANEMIA, UNSPECIFIED TYPE: Primary | ICD-10-CM

## 2017-10-24 LAB
25(OH)D3+25(OH)D2 SERPL-MCNC: 27.8 NG/ML (ref 30–100)
ALBUMIN SERPL-MCNC: 4.4 G/DL (ref 3.6–4.8)
ALBUMIN/GLOB SERPL: 1.8 {RATIO} (ref 1.2–2.2)
ALP SERPL-CCNC: 80 IU/L (ref 39–117)
ALT SERPL-CCNC: 21 IU/L (ref 0–32)
AST SERPL-CCNC: 22 IU/L (ref 0–40)
BILIRUB SERPL-MCNC: <0.2 MG/DL (ref 0–1.2)
BUN SERPL-MCNC: 28 MG/DL (ref 8–27)
BUN/CREAT SERPL: 28 (ref 12–28)
CALCIUM SERPL-MCNC: 9.1 MG/DL (ref 8.7–10.3)
CHLORIDE SERPL-SCNC: 102 MMOL/L (ref 96–106)
CHOLEST SERPL-MCNC: 193 MG/DL (ref 100–199)
CO2 SERPL-SCNC: 26 MMOL/L (ref 18–29)
CREAT SERPL-MCNC: 0.99 MG/DL (ref 0.57–1)
ERYTHROCYTE [DISTWIDTH] IN BLOOD BY AUTOMATED COUNT: 14.2 % (ref 12.3–15.4)
EST. AVERAGE GLUCOSE BLD GHB EST-MCNC: 123 MG/DL
GFR SERPLBLD CREATININE-BSD FMLA CKD-EPI: 60 ML/MIN/1.73
GFR SERPLBLD CREATININE-BSD FMLA CKD-EPI: 69 ML/MIN/1.73
GLOBULIN SER CALC-MCNC: 2.5 G/DL (ref 1.5–4.5)
GLUCOSE SERPL-MCNC: 104 MG/DL (ref 65–99)
HBA1C MFR BLD: 5.9 % (ref 4.8–5.6)
HCT VFR BLD AUTO: 32 % (ref 34–46.6)
HDLC SERPL-MCNC: 55 MG/DL
HGB BLD-MCNC: 10.5 G/DL (ref 11.1–15.9)
LDLC SERPL CALC-MCNC: 117 MG/DL (ref 0–99)
MCH RBC QN AUTO: 29.4 PG (ref 26.6–33)
MCHC RBC AUTO-ENTMCNC: 32.8 G/DL (ref 31.5–35.7)
MCV RBC AUTO: 90 FL (ref 79–97)
PLATELET # BLD AUTO: 327 X10E3/UL (ref 150–379)
POTASSIUM SERPL-SCNC: 3.7 MMOL/L (ref 3.5–5.2)
PROT SERPL-MCNC: 6.9 G/DL (ref 6–8.5)
RBC # BLD AUTO: 3.57 X10E6/UL (ref 3.77–5.28)
SODIUM SERPL-SCNC: 143 MMOL/L (ref 134–144)
TRIGL SERPL-MCNC: 106 MG/DL (ref 0–149)
TSH SERPL DL<=0.005 MIU/L-ACNC: 1.15 UIU/ML (ref 0.45–4.5)
VLDLC SERPL CALC-MCNC: 21 MG/DL (ref 5–40)
WBC # BLD AUTO: 6.3 X10E3/UL (ref 3.4–10.8)

## 2017-10-24 NOTE — PROGRESS NOTES
Mild persistent anemia, send to hematology bobby/judith for further evaluation     Vit d mild low, be sure to take otc vit d 2000units daily     Diet to improve lipids/glucose

## 2017-10-24 NOTE — PROGRESS NOTES
Called, spoke to pt. Two pt identifiers confirmed. Pt informed per Dr. Abhishek Merrill mild persistent anemia, send to hematology bobby/judith for further evaluation. Referred. Pt informed per Dr. Abhishek Merrill vit d low-rec taking vit d 2000u otc daily. Pt informed to watch diet for improvement of lipids/glucose. Pt verbalized understanding of information discussed w/ no further questions at this time.

## 2018-01-05 DIAGNOSIS — J45.40 MODERATE PERSISTENT ASTHMA WITHOUT COMPLICATION: Primary | ICD-10-CM

## 2018-01-05 RX ORDER — ALBUTEROL SULFATE 90 UG/1
AEROSOL, METERED RESPIRATORY (INHALATION)
Qty: 3 INHALER | Refills: 3 | Status: SHIPPED | OUTPATIENT
Start: 2018-01-05 | End: 2019-01-22 | Stop reason: SDUPTHER

## 2018-03-19 RX ORDER — LOSARTAN POTASSIUM AND HYDROCHLOROTHIAZIDE 25; 100 MG/1; MG/1
TABLET ORAL
Qty: 90 TAB | Refills: 1 | Status: SHIPPED | OUTPATIENT
Start: 2018-03-19 | End: 2018-04-23

## 2018-04-23 ENCOUNTER — HOSPITAL ENCOUNTER (OUTPATIENT)
Dept: LAB | Age: 68
Discharge: HOME | End: 2018-04-23
Payer: MEDICARE

## 2018-04-23 ENCOUNTER — OFFICE VISIT (OUTPATIENT)
Dept: INTERNAL MEDICINE CLINIC | Age: 68
End: 2018-04-23

## 2018-04-23 VITALS
HEART RATE: 58 BPM | SYSTOLIC BLOOD PRESSURE: 114 MMHG | TEMPERATURE: 97.9 F | RESPIRATION RATE: 16 BRPM | BODY MASS INDEX: 29.23 KG/M2 | OXYGEN SATURATION: 98 % | HEIGHT: 63 IN | WEIGHT: 165 LBS | DIASTOLIC BLOOD PRESSURE: 64 MMHG

## 2018-04-23 DIAGNOSIS — R01.1 CARDIAC MURMUR: ICD-10-CM

## 2018-04-23 DIAGNOSIS — E55.9 VITAMIN D DEFICIENCY: ICD-10-CM

## 2018-04-23 DIAGNOSIS — D64.9 ANEMIA, UNSPECIFIED TYPE: ICD-10-CM

## 2018-04-23 DIAGNOSIS — R73.01 IFG (IMPAIRED FASTING GLUCOSE): ICD-10-CM

## 2018-04-23 DIAGNOSIS — I10 ESSENTIAL HYPERTENSION: ICD-10-CM

## 2018-04-23 DIAGNOSIS — E66.3 OVERWEIGHT: ICD-10-CM

## 2018-04-23 DIAGNOSIS — J45.40 MODERATE PERSISTENT ASTHMA WITHOUT COMPLICATION: Primary | ICD-10-CM

## 2018-04-23 PROCEDURE — 83036 HEMOGLOBIN GLYCOSYLATED A1C: CPT

## 2018-04-23 PROCEDURE — 85027 COMPLETE CBC AUTOMATED: CPT

## 2018-04-23 PROCEDURE — 80048 BASIC METABOLIC PNL TOTAL CA: CPT

## 2018-04-23 PROCEDURE — 36415 COLL VENOUS BLD VENIPUNCTURE: CPT

## 2018-04-23 RX ORDER — AMLODIPINE BESYLATE 2.5 MG/1
TABLET ORAL
Qty: 90 TAB | Refills: 1 | Status: SHIPPED | OUTPATIENT
Start: 2018-04-23 | End: 2019-03-04 | Stop reason: SDUPTHER

## 2018-04-23 RX ORDER — MONTELUKAST SODIUM 10 MG/1
TABLET ORAL
Qty: 90 TAB | Refills: 1 | Status: SHIPPED | OUTPATIENT
Start: 2018-04-23 | End: 2019-01-22 | Stop reason: SDUPTHER

## 2018-04-23 RX ORDER — FLUTICASONE PROPIONATE 220 UG/1
1 AEROSOL, METERED RESPIRATORY (INHALATION) 2 TIMES DAILY
Qty: 4 INHALER | Refills: 3 | Status: SHIPPED | OUTPATIENT
Start: 2018-04-23 | End: 2019-11-05

## 2018-04-23 NOTE — PROGRESS NOTES
HISTORY OF PRESENT ILLNESS  Linn Hanley is a 78 y.o. female. HPI   Last here 10/23/17. Pt is here to f/u on chronic conditions.      BP today is 114/64  BPs are 120-130/60-70 at home  Pt denies having orthostatic sx  Continues on losartan-HCTZ 100-25mg and norvasc 2.5mg daily       Wt is down 5 lbs since last visit  Congratulated pt on this feat    Pt has not been exercising a lot x snf   Pt has been walking 2-3x weekly by herself   However, she has been watching what she eats  Discussed continued diet and w/l     Reviewed labs 10/17: low vit D, continued mild anemia  She is now taking a multivitamin with iron and vit D OTC daily  She states that she has been anemic x 6 y.o. Will get labs today     Continues singulair daily and flovent BID  She wonders if there is a generic form of flovent, as this med is expensive  Discussed this med being the generic version   She also has albuterol to use prn for breathing- stable    Pt went to [facility] for bronchitis in the winter  Pt was provided with a zpak with improvement to sx        Lov, pt had a quiet murmur  Pt states that she has had a murmur for a long time  Lov, I ordered an ECHO  Discussed it being OK to hold off on an ECHO     ACP not on file.  SDM is her .  Coretha Sessions:    Colonoscopy: 12/22/16, Dr. Chris Hermosillo, repeat 3 years  Pap: Dr. Ember Clark, 8/2017-9/2017, every other year  Mammogram: Dr. Ember Clark, 8/2017-9/2017, negative, will get report for review  DEXA: due, discussed, ordered, will schedule, reminded   AAA: no FMHX  Tdap: 5/13/2013  Pneumovax: 10/23/17  [id number]: 10/18/2016  Zostavax: 9/25/2015  Flu shot: 10/23/17  A1c: 4/16 6.1, 10/16 6.2, 10/17 5.9  Eye exam: Dr. Narayan Rdoríguez, 3/18, will get notes for review  Lipids: 10/17   EKG: 10/23/17, nsr    Patient Active Problem List    Diagnosis Date Noted    Cyst of tendon sheath 04/09/2012    Depression, reactive 06/27/2011    HTN (hypertension) 12/14/2009    Asthma 12/14/2009    Allergic rhinitis 12/14/2009     Current Outpatient Prescriptions   Medication Sig Dispense Refill    losartan-hydroCHLOROthiazide (HYZAAR) 100-25 mg per tablet take 1 tablet by mouth once daily 90 Tab 1    albuterol (PROAIR HFA) 90 mcg/actuation inhaler INHALE 2 PUFFS BY MOUTH EVERY 4 HOURS AS NEEDED FOR WHEEZING 3 Inhaler 3    fluticasone (FLOVENT HFA) 220 mcg/actuation inhaler Take 1 Puff by inhalation two (2) times a day. (Patient taking differently: Take 1 Puff by inhalation daily. ) 1 Inhaler 3    amLODIPine (NORVASC) 2.5 mg tablet take 1 tablet by mouth once daily 90 Tab 1    albuterol (PROVENTIL VENTOLIN) 2.5 mg /3 mL (0.083 %) nebulizer solution 3 mL by Nebulization route every four (4) hours as needed for Wheezing. 1 Package 11    montelukast (SINGULAIR) 10 mg tablet take 1 tablet by mouth once daily 90 Tab 3    losartan-hydrochlorothiazide (HYZAAR) 100-25 mg per tablet take 1 tablet by mouth daily 30 Tab 6     Past Surgical History:   Procedure Laterality Date    HX ORTHOPAEDIC      back      Lab Results  Component Value Date/Time   WBC 6.3 10/23/2017 09:59 AM   HGB 10.5 (L) 10/23/2017 09:59 AM   HCT 32.0 (L) 10/23/2017 09:59 AM   PLATELET 301 65/58/3245 09:59 AM   MCV 90 10/23/2017 09:59 AM     Lab Results  Component Value Date/Time   Cholesterol, total 193 10/23/2017 09:59 AM   HDL Cholesterol 55 10/23/2017 09:59 AM   LDL, calculated 117 (H) 10/23/2017 09:59 AM   Triglyceride 106 10/23/2017 09:59 AM     Lab Results  Component Value Date/Time   GFR est non-AA 60 10/23/2017 09:59 AM   GFR est AA 69 10/23/2017 09:59 AM   Creatinine 0.99 10/23/2017 09:59 AM   BUN 28 (H) 10/23/2017 09:59 AM   Sodium 143 10/23/2017 09:59 AM   Potassium 3.7 10/23/2017 09:59 AM   Chloride 102 10/23/2017 09:59 AM   CO2 26 10/23/2017 09:59 AM        Review of Systems   Respiratory: Negative for shortness of breath. Cardiovascular: Negative for chest pain. Neurological: Negative for dizziness.        Physical Exam Constitutional: She is oriented to person, place, and time. She appears well-developed and well-nourished. No distress. HENT:   Head: Normocephalic and atraumatic. Mouth/Throat: Oropharynx is clear and moist.   Eyes: Conjunctivae and EOM are normal. Right eye exhibits no discharge. Left eye exhibits no discharge. Neck: Normal range of motion. Neck supple. Cardiovascular: Normal rate, regular rhythm and intact distal pulses. Exam reveals no gallop and no friction rub. Murmur (1/6) heard. Pulmonary/Chest: Effort normal and breath sounds normal. No respiratory distress. She has no wheezes. She has no rales. She exhibits no tenderness. Musculoskeletal: Normal range of motion. She exhibits no edema, tenderness or deformity. Lymphadenopathy:     She has no cervical adenopathy. Neurological: She is alert and oriented to person, place, and time. Coordination normal.   Skin: Skin is warm and dry. No rash noted. She is not diaphoretic. No erythema. No pallor. Psychiatric: She has a normal mood and affect. Her behavior is normal.       ASSESSMENT and PLAN    ICD-10-CM ICD-9-CM    1. Moderate persistent asthma without complication    Controled with flovent BID and singulair, continue, refills placed    Q72.86 635.55 METABOLIC PANEL, BASIC      CBC W/O DIFF      HEMOGLOBIN A1C WITH EAG   2. Essential hypertension    Controled on losartan-hct and norvasc no orthostasis, continue   X58 925.4 METABOLIC PANEL, BASIC      CBC W/O DIFF      HEMOGLOBIN A1C WITH EAG   3. Vitamin D deficiency    Check level with next labs, make sure at goal, continue OTC vit D--currently just taking mvi with d in it  May need to increase this in the future. T70.1 697.1 METABOLIC PANEL, BASIC      CBC W/O DIFF      HEMOGLOBIN A1C WITH EAG   4.  Anemia, unspecified type    Pt is taking OTC vits, anemia chronic mild, sent her to hem lov but she is not interested in pursuing this eval for now   J07.6 420.5 METABOLIC PANEL, BASIC CBC W/O DIFF      HEMOGLOBIN A1C WITH EAG   5. IFG (impaired fasting glucose)    Check a1c, diet-controled, wt improving, working on diet and w/l   F53.77 461.15 METABOLIC PANEL, BASIC      CBC W/O DIFF      HEMOGLOBIN A1C WITH EAG   6. Overweight    See above   P45.1 422.85 METABOLIC PANEL, BASIC      CBC W/O DIFF      HEMOGLOBIN A1C WITH EAG   7. Cardiac murmur    Mild, pt is asymptomatic, she is not interested in pursuing ECHO at this time    Y98.7 073.1 METABOLIC PANEL, BASIC      CBC W/O DIFF      HEMOGLOBIN A1C WITH EAG        Scribed by Anand Stevenson of [address] [place] [address] [address], as dictated by Dr. Hilda Pulido. Current diagnosis and concerns discussed with pt at length. Pt understands risks and benefits or current treatment plan and medications, and accepts the treatment and medication with any possible risks. Pt asks appropriate questions, which were answered. Pt was instructed to call with any concerns or problems. This note will not be viewable in [address]

## 2018-04-23 NOTE — MR AVS SNAPSHOT
[address] [facility] [address] [facility] 83. 
[phone] Patient: Nikki Otto MRN:  [id number] Visit Information Date & Time Provider Department Dept. Phone Encounter #  
 4/23/2018  9:15 AM Eun Vazquezh, [address] [place] [address],[address] [phone] [id number] Follow-up Instructions Return in about 6 months (around 10/23/2018). Upcoming Health Maintenance Date Due Bone Densitometry (Dexa) Screening 9/13/2016 GLAUCOMA SCREENING Q2Y 10/24/2018 MEDICARE YEARLY EXAM 10/24/2018 BREAST CANCER SCRN MAMMOGRAM 9/18/2019 DTaP/Tdap/Td series (2 - Td) 5/13/2023 COLONOSCOPY 12/22/2026 Allergies as of 4/23/2018  Review Complete On: 4/23/2018 By: Mihir Perales LPN Severity Noted Reaction Type Reactions Acetylcarnitine Medium 04/29/2016    Swelling Ace Inhibitors  10/10/2012   Side Effect Cough Aleve [Naproxen Sodium]  12/09/2009    Hives Daypro [Oxaprozin]  12/09/2009    Rash Effexor [Venlafaxine]  06/27/2011    Other (comments) Abdominal pain Prednisone  10/26/2010    Swelling Face swelling ONLY with large dosage Shellfish Containing Products  12/09/2009    Swelling Throat and tounge Current Immunizations  Reviewed on 10/22/2013 Name Date Influenza Vaccine 10/17/2016, 10/18/2015, 10/20/2013 Influenza Vaccine PF 9/16/2014 Influenza Vaccine Split 10/10/2011 Influenza Vaccine Whole 10/11/2010 Pneumococcal Conjugate (PCV-13) 10/17/2016 Pneumococcal Polysaccharide (PPSV-23) 10/23/2017, 1/1/2010 Tdap 5/13/2013 ZZZ-RETIRED (DO NOT USE) Pneumococcal Vaccine (Unspecified Type) 10/26/2010 Zoster Vaccine, Live 9/25/2015 Not reviewed this visit You Were Diagnosed With   
  
 Codes Comments Moderate persistent asthma without complication    -  Primary ICD-10-CM: J45.40 ICD-9-CM: 493.90  Essential hypertension     ICD-10-CM: I10 
 ICD-9-CM: 401.9 Vitamin D deficiency     ICD-10-CM: E55.9 ICD-9-CM: 268.9 Anemia, unspecified type     ICD-10-CM: D64.9 ICD-9-CM: 285.9 IFG (impaired fasting glucose)     ICD-10-CM: R73.01 
ICD-9-CM: 790.21 Overweight     ICD-10-CM: S20.1 ICD-9-CM: 278.02 Cardiac murmur     ICD-10-CM: R01.1 ICD-9-CM: 741. 2 Vitals BP Pulse Temp Resp Height(growth percentile) Weight(growth percentile) 114/64 (BP 1 Location: Left arm, BP Patient Position: Sitting) (!) 58 97.9 °F (36.6 °C) (Oral) 16 5' 2.5\" (1.588 m) 165 lb (74.8 kg) SpO2 BMI OB Status Smoking Status 98% 29.7 kg/m2 Postmenopausal Never Smoker Vitals History BMI and BSA Data Body Mass Index Body Surface Area  
 29.7 kg/m 2 1.82 m 2 Preferred Pharmacy Pharmacy Name Phone [phone] [address] [phone] Your Updated Medication List  
  
   
This list is accurate as of 4/23/18  9:29 AM.  Always use your most recent med list.  
  
  
  
  
 * albuterol 2.5 mg /3 mL (0.083 %) nebulizer solution Commonly known as:  PROVENTIL VENTOLIN  
3 mL by Nebulization route every four (4) hours as needed for Wheezing. * albuterol 90 mcg/actuation inhaler Commonly known as:  PROAIR HFA INHALE 2 PUFFS BY MOUTH EVERY 4 HOURS AS NEEDED FOR WHEEZING  
  
 amLODIPine 2.5 mg tablet Commonly known as:  NORVASC  
take 1 tablet by mouth once daily  
  
 fluticasone 220 mcg/actuation inhaler Commonly known as:  FLOVENT HFA Take 1 Puff by inhalation two (2) times a day. losartan-hydroCHLOROthiazide 100-25 mg per tablet Commonly known as:  HYZAAR  
take 1 tablet by mouth daily  
  
 montelukast 10 mg tablet Commonly known as:  SINGULAIR  
take 1 tablet by mouth once daily * Notice: This list has 2 medication(s) that are the same as other medications prescribed for you.  Read the directions carefully, and ask your doctor or other care provider to review them with you. Prescriptions Sent to Pharmacy Refills  
 montelukast (SINGULAIR) 10 mg tablet 1 Sig: take 1 tablet by mouth once daily Class: Normal  
 [facility] [address] Ph #: [phone]  
 amLODIPine (NORVASC) 2.5 mg tablet 1 Sig: take 1 tablet by mouth once daily Class: Normal  
 Pharmacy: [address] [place] [address] Ph #: [phone]  
 fluticasone (FLOVENT HFA) 220 mcg/actuation inhaler 3 Sig: Take 1 Puff by inhalation two (2) times a day. Class: Normal  
 [facility] [address] [place] [address] Ph #: [phone] Route: Inhalation We Performed the Following CBC W/O DIFF [93902 CPT(R)] HEMOGLOBIN A1C WITH EAG [78201 CPT(R)] METABOLIC PANEL, BASIC [70841 CPT(R)] Follow-up Instructions Return in about 6 months (around 10/23/2018). Introducing [address]! Dear Eren Bah: Thank you for requesting a [employer] account. Our records indicate that you already have an active [employer] account. You can access your account anytime at [email or web address] Did you know that you can access your hospital and ER discharge instructions at any time in [employer]? You can also review all of your test results from your hospital stay or ER visit. Additional Information If you have questions, please visit the Frequently Asked Questions section of the [employer] website at [email or web address] Remember, [employer] is NOT to be used for urgent needs. For medical emergencies, dial 911. Now available from your iPhone and Android! Please provide this summary of care documentation to your next provider. Your primary care clinician is listed as Glenda Stiles.  If you have any questions after today's visit, please call [phone].

## 2018-04-24 LAB
BUN SERPL-MCNC: 26 MG/DL (ref 8–27)
BUN/CREAT SERPL: 24 (ref 12–28)
CALCIUM SERPL-MCNC: 9.8 MG/DL (ref 8.7–10.3)
CHLORIDE SERPL-SCNC: 100 MMOL/L (ref 96–106)
CO2 SERPL-SCNC: 26 MMOL/L (ref 18–29)
CREAT SERPL-MCNC: 1.07 MG/DL (ref 0.57–1)
ERYTHROCYTE [DISTWIDTH] IN BLOOD BY AUTOMATED COUNT: 14.2 % (ref 12.3–15.4)
EST. AVERAGE GLUCOSE BLD GHB EST-MCNC: 114 MG/DL
GFR SERPLBLD CREATININE-BSD FMLA CKD-EPI: 54 ML/MIN/1.73
GFR SERPLBLD CREATININE-BSD FMLA CKD-EPI: 63 ML/MIN/1.73
GLUCOSE SERPL-MCNC: 96 MG/DL (ref 65–99)
HBA1C MFR BLD: 5.6 % (ref 4.8–5.6)
HCT VFR BLD AUTO: 31.9 % (ref 34–46.6)
HGB BLD-MCNC: 10.4 G/DL (ref 11.1–15.9)
MCH RBC QN AUTO: 29.3 PG (ref 26.6–33)
MCHC RBC AUTO-ENTMCNC: 32.6 G/DL (ref 31.5–35.7)
MCV RBC AUTO: 90 FL (ref 79–97)
PLATELET # BLD AUTO: 296 X10E3/UL (ref 150–379)
POTASSIUM SERPL-SCNC: 3.6 MMOL/L (ref 3.5–5.2)
RBC # BLD AUTO: 3.55 X10E6/UL (ref 3.77–5.28)
SODIUM SERPL-SCNC: 142 MMOL/L (ref 134–144)
WBC # BLD AUTO: 8.6 X10E3/UL (ref 3.4–10.8)

## 2018-10-19 ENCOUNTER — DOCUMENTATION ONLY (OUTPATIENT)
Dept: INTERNAL MEDICINE CLINIC | Age: 68
End: 2018-10-19

## 2018-10-19 NOTE — PROGRESS NOTES
Medicare Part B Preventive Services Guidelines/Limitations Date last completed and Frequency Due Date   Bone Mass Measurement  (age 72 & older, biennial) Requires diagnosis related to osteoporosis or estrogen deficiency. Biennial benefit unless patient has history of long-term glucocorticoid tx or baseline is needed because initial test was by other method  Recommended every 2 years Due now   Cardiovascular Screening Blood Tests (every 5 years)  Total cholesterol, HDL, Triglycerides Order as a panel if possible Completed 10/2017    Recommended annually Due now   Colorectal Cancer Screening  -Fecal occult blood test (annual)  -Flexible sigmoidoscopy (5y)  -Screening colonoscopy (10y)  -Barium Enema   Completed  12/22/16 with Dr Megan Hi    Recommended repeat in 3 years Due 12/2019   Counseling to Prevent Tobacco Use (up to 8 sessions per year)  - Counseling greater than 3 and up to 10 minutes  - Counseling greater than 10 minutes Patients must be asymptomatic of tobacco-related conditions to receive as preventive service n/a n/a   Diabetes Screening Tests (at least every 3 years, Medicare covers annually or at 6-month intervals for prediabetic patients)     Fasting blood sugar (FBS) or glucose tolerance test (GTT) Patient must be diagnosed with one of the following:  -Hypertension, Dyslipidemia, obesity, previous impaired FBS or GTT  Or any two of the following: overweight, FH of diabetes, age ? 72, history of gestational diabetes, birth of baby weighing more than 9 pounds Completed 4/2018 with A1C 5.6     Recommended every 3 years for non-diabetics, typically checked annually Due 4/2019   Diabetes Self-Management Training (DSMT) (no USPSTF recommendation) Requires referral by treating physician for patient with diabetes or renal disease. 10 hours of initial DSMT session of no less than 30 minutes each in a continuous 12-month period. 2 hours of follow-up DSMT in subsequent years.  n/a n/a   Glaucoma Screening (no USPSTF recommendation) Diabetes mellitus, family history, , age 48 or over,  American, age 72 or over Completed 3/2018    Recommended annually Due 3/2019   Human Immunodeficiency Virus (HIV) Screening (annually for increased risk patients)  HIV-1 and HIV-2 by EIA, LOGAN, rapid antibody test, or oral mucosa transudate Patient must be at increased risk for HIV infection per USPSTF guidelines or pregnant. Tests covered annually for patients at increased risk. Pregnant patients may receive up to 3 test during pregnancy. n/a  N/A   Medical Nutrition Therapy (MNT) (for diabetes or renal disease not recommended schedule) Requires referral by treating physician for patient with diabetes or renal disease. Can be provided in same year as diabetes self-management training (DSMT), and CMS recommends medical nutrition therapy take place after DSMT. Up to 3 hours for initial year and 2 hours in subsequent years. n/a  N/A         Seasonal Influenza Vaccination (annually)   Completed 10/2017    Recommended Annually Due now   Pneumococcal Vaccination (once after 72)   Pneumococcal 23 -10/23/17  Recommended once over the age of 72     Prevnar 15 -10/18/16Recommended once over the age of 72 Complete             Complete   Hepatitis B Vaccinations (if medium/high risk) Medium/high risk factors:  End-stage renal disease,  Hemophiliacs who received Factor VIII or IX concentrates, Clients of institutions for the mentally retarded, Persons who live in the same house as a HepB virus carrier, Homosexual men, Illicit injectable drug abusers. n/a n/a   Screening Mammography (biennial age 54-69)?  Annually (age 36 or over) Completed 8/2017     Recommended annually Due now   Screening Pap Tests and Pelvic Examination (up to age 79 and after 79 if unknown history or abnormal study last 10 years) Every 24 months except high risk Completed 8/2017    Recommended every 2 years Due 8/2019   Ultrasound Screening for Abdominal Aortic Aneurysm (AAA) (once) Patient must be referred through IPPE and not have had a screening for abdominal aortic aneurysm before under Medicare.   Limited to patients who meet one of the following criteria:  - Men who are 73-68 years old and have smoked more than 100 cigarettes in their lifetime.  -Anyone with a FH of AAA  -Anyone recommended for screening by USPSTF n/a n/a

## 2018-10-22 ENCOUNTER — HOSPITAL ENCOUNTER (OUTPATIENT)
Dept: LAB | Age: 68
Discharge: HOME | End: 2018-10-22
Payer: MEDICARE

## 2018-10-22 ENCOUNTER — OFFICE VISIT (OUTPATIENT)
Dept: INTERNAL MEDICINE CLINIC | Age: 68
End: 2018-10-22

## 2018-10-22 VITALS
HEART RATE: 61 BPM | OXYGEN SATURATION: 98 % | TEMPERATURE: 98 F | BODY MASS INDEX: 28.88 KG/M2 | SYSTOLIC BLOOD PRESSURE: 134 MMHG | RESPIRATION RATE: 16 BRPM | DIASTOLIC BLOOD PRESSURE: 64 MMHG | WEIGHT: 163 LBS | HEIGHT: 63 IN

## 2018-10-22 DIAGNOSIS — Z00.00 MEDICARE ANNUAL WELLNESS VISIT, SUBSEQUENT: Primary | ICD-10-CM

## 2018-10-22 DIAGNOSIS — E78.5 DYSLIPIDEMIA: ICD-10-CM

## 2018-10-22 DIAGNOSIS — D64.9 ANEMIA, UNSPECIFIED TYPE: ICD-10-CM

## 2018-10-22 DIAGNOSIS — R73.01 IFG (IMPAIRED FASTING GLUCOSE): ICD-10-CM

## 2018-10-22 DIAGNOSIS — J45.40 MODERATE PERSISTENT ASTHMA WITHOUT COMPLICATION: ICD-10-CM

## 2018-10-22 DIAGNOSIS — I10 ESSENTIAL HYPERTENSION: ICD-10-CM

## 2018-10-22 DIAGNOSIS — E55.9 VITAMIN D DEFICIENCY: ICD-10-CM

## 2018-10-22 PROCEDURE — 83036 HEMOGLOBIN GLYCOSYLATED A1C: CPT

## 2018-10-22 PROCEDURE — 82306 VITAMIN D 25 HYDROXY: CPT

## 2018-10-22 PROCEDURE — 80061 LIPID PANEL: CPT

## 2018-10-22 PROCEDURE — 36415 COLL VENOUS BLD VENIPUNCTURE: CPT

## 2018-10-22 PROCEDURE — 82728 ASSAY OF FERRITIN: CPT

## 2018-10-22 PROCEDURE — 83550 IRON BINDING TEST: CPT

## 2018-10-22 PROCEDURE — 85027 COMPLETE CBC AUTOMATED: CPT

## 2018-10-22 PROCEDURE — 80053 COMPREHEN METABOLIC PANEL: CPT

## 2018-10-22 PROCEDURE — 84443 ASSAY THYROID STIM HORMONE: CPT

## 2018-10-22 RX ORDER — FLUTICASONE FUROATE AND VILANTEROL 200; 25 UG/1; UG/1
1 POWDER RESPIRATORY (INHALATION) DAILY
Qty: 1 INHALER | Refills: 0 | Status: SHIPPED | COMMUNITY
Start: 2018-10-22 | End: 2019-11-05

## 2018-10-22 NOTE — PROGRESS NOTES
This is the Subsequent Medicare Annual Wellness Exam, performed 12 months or more after the Initial AWV or the last Subsequent AWV I have reviewed the patient's medical history in detail and updated the computerized patient record. History Past Medical History:  
Diagnosis Date  Ankle sprain 03/2014  
 right  Asthma 12/14/2009  
 HTN (hypertension) 12/14/2009  Iron deficiency anemia 12/14/2009 Past Surgical History:  
Procedure Laterality Date  HX ORTHOPAEDIC    
 back Current Outpatient Medications Medication Sig Dispense Refill  montelukast (SINGULAIR) 10 mg tablet take 1 tablet by mouth once daily 90 Tab 1  
 amLODIPine (NORVASC) 2.5 mg tablet take 1 tablet by mouth once daily 90 Tab 1  
 fluticasone (FLOVENT HFA) 220 mcg/actuation inhaler Take 1 Puff by inhalation two (2) times a day. 4 Inhaler 3  
 albuterol (PROAIR HFA) 90 mcg/actuation inhaler INHALE 2 PUFFS BY MOUTH EVERY 4 HOURS AS NEEDED FOR WHEEZING 3 Inhaler 3  
 albuterol (PROVENTIL VENTOLIN) 2.5 mg /3 mL (0.083 %) nebulizer solution 3 mL by Nebulization route every four (4) hours as needed for Wheezing. 1 Package 11  
 losartan-hydrochlorothiazide (HYZAAR) 100-25 mg per tablet take 1 tablet by mouth daily 30 Tab 6 Allergies Allergen Reactions  Acetylcarnitine Swelling  Ace Inhibitors Cough  Aleve [Naproxen Sodium] Hives  Daypro [Oxaprozin] Rash  Effexor [Venlafaxine] Other (comments) Abdominal pain  Prednisone Swelling Face swelling ONLY with large dosage  Shellfish Containing Products Swelling Throat and tounge No family history on file. Social History Tobacco Use  Smoking status: Never Smoker  Smokeless tobacco: Never Used Substance Use Topics  Alcohol use: Yes Alcohol/week: 0.5 oz Types: 1 Glasses of wine per week Comment: occasionally Patient Active Problem List  
Diagnosis Code  
 HTN (hypertension) I10  
 Asthma J45.909  Allergic rhinitis J30.9  Depression, reactive F32.9  Cyst of tendon sheath M67.80 Depression Risk Factor Screening: PHQ over the last two weeks 10/22/2018 Little interest or pleasure in doing things Not at all Feeling down, depressed, irritable, or hopeless Not at all Total Score PHQ 2 0 Alcohol Risk Factor Screening: You do not drink alcohol or very rarely. Functional Ability and Level of Safety:  
Hearing Loss Hearing is good. Activities of Daily Living The home contains: no safety equipment. Patient does total self care Fall Risk Fall Risk Assessment, last 12 mths 10/22/2018 Able to walk? Yes Fall in past 12 months? No  
 
 
Abuse Screen Patient is not abused Cognitive Screening Evaluation of Cognitive Function: 
Has your family/caregiver stated any concerns about your memory: no 
Normal 
 
Patient Care Team  
Patient Care Team: 
La Jay MD as PCP - General (Internal Medicine) Matt Sepulveda MD (Ophthalmology) Rea Hardwick MD (Gastroenterology) Chris Lowe MD (Obstetrics & Gynecology) Landy Esteban MD (Orthopedic Surgery)  
updated Assessment/Plan Education and counseling provided: 
Are appropriate based on today's review and evaluation End-of-Life planning (with patient's consent) Influenza Vaccine Screening Mammography Screening Pap and pelvic (covered once every 2 years) Bone mass measurement (DEXA) Diabetes screening test 
 
Diagnoses and all orders for this visit: 
 
Medicare annual wellness visit, subsequent Health Maintenance Due Topic Date Due  Shingrix Vaccine Age 50> (1 of 2) 09/13/2001  Bone Densitometry (Dexa) Screening  09/13/2016  Influenza Age 5 to Adult  08/01/2018  GLAUCOMA SCREENING Q2Y  10/24/2018 Discussed with patient about advance medical directive. Provided patient blank AMD and Your Right to Decide Booklet.   Requested that if completed to provide a copy of AMD to office. ACP not on file. SDM is her . Pt has this information at home and will bring it in. 
   
  
Colonoscopy: 12/22/16, Dr. Matilde Giraldo, repeat 3 years--12/19 Pap: Dr. Urmila Copeland, 8/2017-9/2017, every other year Mammogram: Dr. Urmila Copeland, 8/2017-9/2017, negative, will get report, due, pt will schedule DEXA: due, discussed, ordered, will schedule, reminded again, pt will try to schedule this with mammo AAA: [facility] Tdap: 5/13/2013 Pneumovax: 10/23/17 [id number]: 10/18/2016 Zostavax: 9/25/2015 Shingrix: ordered 10/22/18 Flu shot: Fall 2018 Eye exam: Dr. Bridget Farnsworth, 3/18 A1c:  4/18 5.6 due now Lipids: 10/17  due now EKG: 10/23/17, nsr Medication reconciliation completed by MA and reviewed by me. Medical/surgical/social/family history reviewed and updated by me. Patient provided AVS and preventative screening table. Patient verbalized understanding of all information discussed.

## 2018-10-22 NOTE — PROGRESS NOTES
HISTORY OF PRESENT ILLNESS [place] Patient is a 80 y.o. female. HPI Last here 4/23/18. Pt is here to f/u on chronic conditions. 
   
BP today is 134/64 BP [id number] at home Continues on losartan-HCTZ 100-25mg and norvasc 2.5mg daily Denies any orthostasis 
   
Wt is 163 lbs --down 2 lbs x lov Discussed continued diet and w/l  
  
Reviewed labs 4/18 Will get labs today Pt has not seen a derm recently Advised pt to get an annual skin check Provided information for Dr Chantal Shepherd (derm) 
  
Continues singulair daily and flovent BID She also has albuterol to use prn for breathing- stable Pt states she has been experiencing an ongoing cough x a couple of months She wakes up at night sometimes with a strangling cough, and occasionally gets sx during the day Describes this feeling as though there is something in her throat that she cannot get up When this happens, she uses her albuterol which calms it down This occurs about 2-3 nights per week She had experienced sx that felt similar in the past but only when she was sick Pt will then be wheezy the next day after she has these sx Will provide inhaler sample Recall advair caused a rash Pt had a zostavax vaccine in 2015 Discussed shingrix being a dead vaccine Discussed it being more effective than zostavax (92% effective) Discussed it being a 2 part series Ordered 10/22/18   
  
ACP not on file. SDM is her . Pt has this information at home and will bring it in. 
   
PREVENTIVE:   
Colonoscopy: 12/22/16, Dr. Janusz Galaviz, repeat 3 years Pap: Dr. Lj Aguiar, 8/2017-9/2017, every other year Mammogram: Dr. Lj Aguiar, 8/2017-9/2017, negative, will get report, due, pt will schedule DEXA: due, discussed, ordered, will schedule, reminded again, pt will try to schedule this with mammo AAA: [facility] Tdap: 5/13/2013 Pneumovax: 10/23/17 [id number]: 10/18/2016 Zostavax: 9/25/2015 Shingrix: ordered 10/22/18 Flu shot: Fall 2018 A1c: 4/16 6.1, 10/16 6.2, 10/17 5.9, 4/18 5.6 Eye exam: Dr. Estefany Castillo, 3/18 Lipids: 10/17  EKG: 10/23/17, nsr Patient Active Problem List  
 Diagnosis Date Noted  Cyst of tendon sheath 04/09/2012  Depression, reactive 06/27/2011  
 HTN (hypertension) 12/14/2009  Asthma 12/14/2009  Allergic rhinitis 12/14/2009 Current Outpatient Medications Medication Sig Dispense Refill  montelukast (SINGULAIR) 10 mg tablet take 1 tablet by mouth once daily 90 Tab 1  
 amLODIPine (NORVASC) 2.5 mg tablet take 1 tablet by mouth once daily 90 Tab 1  
 fluticasone (FLOVENT HFA) 220 mcg/actuation inhaler Take 1 Puff by inhalation two (2) times a day. 4 Inhaler 3  
 albuterol (PROAIR HFA) 90 mcg/actuation inhaler INHALE 2 PUFFS BY MOUTH EVERY 4 HOURS AS NEEDED FOR WHEEZING 3 Inhaler 3  
 albuterol (PROVENTIL VENTOLIN) 2.5 mg /3 mL (0.083 %) nebulizer solution 3 mL by Nebulization route every four (4) hours as needed for Wheezing. 1 Package 11  
 losartan-hydrochlorothiazide (HYZAAR) 100-25 mg per tablet take 1 tablet by mouth daily 30 Tab 6 Past Surgical History:  
Procedure Laterality Date  HX ORTHOPAEDIC    
 back Lab Results Component Value Date/Time WBC 8.6 04/23/2018 09:43 AM  
 HGB 10.4 (L) 04/23/2018 09:43 AM  
 HCT 31.9 (L) 04/23/2018 09:43 AM  
 PLATELET 157 98/15/8082 09:43 AM  
 MCV 90 04/23/2018 09:43 AM  
 
Lab Results Component Value Date/Time Cholesterol, total 193 10/23/2017 09:59 AM  
 HDL Cholesterol 55 10/23/2017 09:59 AM  
 LDL, calculated 117 (H) 10/23/2017 09:59 AM  
 Triglyceride 106 10/23/2017 09:59 AM  
 
Lab Results Component Value Date/Time GFR est non-AA 54 (L) 04/23/2018 09:43 AM  
 GFR est AA 63 04/23/2018 09:43 AM  
 Creatinine 1.07 (H) 04/23/2018 09:43 AM  
 BUN 26 04/23/2018 09:43 AM  
 Sodium 142 04/23/2018 09:43 AM  
 Potassium 3.6 04/23/2018 09:43 AM  
 Chloride 100 04/23/2018 09:43 AM  
 CO2 26 04/23/2018 09:43 AM  
  
Review of Systems Respiratory: Positive for cough. Negative for shortness of breath. Cardiovascular: Negative for chest pain. Physical Exam  
Constitutional: She is oriented to person, place, and time. She appears well-developed and well-nourished. No distress. HENT:  
Head: Normocephalic and atraumatic. Right Ear: External ear normal.  
Left Ear: External ear normal.  
Mouth/Throat: Oropharynx is clear and moist. No oropharyngeal exudate. Eyes: Conjunctivae and EOM are normal. Right eye exhibits no discharge. Left eye exhibits no discharge. Neck: Normal range of motion. Neck supple. Cardiovascular: Normal rate, regular rhythm, normal heart sounds and intact distal pulses. Exam reveals no gallop and no friction rub. No murmur heard. Pulmonary/Chest: Effort normal and breath sounds normal. No respiratory distress. She has no wheezes. She has no rales. She exhibits no tenderness. Abdominal: She exhibits no distension and no mass. There is no tenderness. There is no rebound and no guarding. Musculoskeletal: Normal range of motion. She exhibits no edema, tenderness or deformity. Lymphadenopathy:  
  She has no cervical adenopathy. Neurological: She is alert and oriented to person, place, and time. Coordination normal.  
Skin: Skin is warm and dry. No rash noted. She is not diaphoretic. No erythema. No pallor. Psychiatric: She has a normal mood and affect. Her behavior is normal.  
 
 
ASSESSMENT and PLAN 
  ICD-10-CM ICD-9-CM 1. Medicare annual wellness visit, subsequent Z00.00 V70.0 LIPID PANEL  
   CBC W/O DIFF  
   TSH 3RD GENERATION  
   VITAMIN D, 25 HYDROXY  
   HEMOGLOBIN A1C WITH EAG  
   METABOLIC PANEL, COMPREHENSIVE [address] 2. Essential hypertension Controlled on losartan-HCTZ and norvasc 2.5mg daily, continue no change to dose  I10 401.9 LIPID PANEL  
   CBC W/O DIFF  
   TSH 3RD GENERATION  
   VITAMIN D, 25 HYDROXY  
   HEMOGLOBIN A1C WITH EAG  
 METABOLIC PANEL, COMPREHENSIVE [address] 3. Moderate persistent asthma without complication Pt having progressive cough variant asthma primarily in evening, reports a rash allergy to advair in the past currently takes flovent and singulair, will give trial breo and see if this improves her sx, ultimately may need to get PFTs J45.40 493.90 LIPID PANEL  
   CBC W/O DIFF  
   TSH 3RD GENERATION  
   VITAMIN D, 25 HYDROXY  
   HEMOGLOBIN A1C WITH EAG  
   METABOLIC PANEL, COMPREHENSIVE [address] 4. Vitamin D deficiency Check level and treat prn 
 E55.9 268.9 LIPID PANEL  
   CBC W/O DIFF  
   TSH 3RD GENERATION  
   VITAMIN D, 25 HYDROXY  
   HEMOGLOBIN A1C WITH EAG  
   METABOLIC PANEL, COMPREHENSIVE FERRITIN  
   IRON PROFILE  
5. IFG (impaired fasting glucose) Check a1c 
 R73.01 790.21 LIPID PANEL  
   CBC W/O DIFF  
   TSH 3RD GENERATION  
   VITAMIN D, 25 HYDROXY  
   HEMOGLOBIN A1C WITH EAG  
   METABOLIC PANEL, COMPREHENSIVE [address] 6. Dyslipidemia Check lipids, currently diet controlled E78.5 272.4 LIPID PANEL  
   CBC W/O DIFF  
   TSH 3RD GENERATION  
   VITAMIN D, 25 HYDROXY  
   HEMOGLOBIN A1C WITH EAG  
   METABOLIC PANEL, COMPREHENSIVE FERRITIN  
   IRON PROFILE  
7. Anemia, unspecified type Has chronic anemia, will check CBC and iron studies to ensure stable D64.9 285.9 LIPID PANEL  
   CBC W/O DIFF  
   TSH 3RD GENERATION  
   VITAMIN D, 25 HYDROXY  
   HEMOGLOBIN A1C WITH EAG  
   METABOLIC PANEL, COMPREHENSIVE [address] Scribed by Romeo Manning of [address] [place] [address] [address], as dictated by Dr. Star Lou. Current diagnosis and concerns discussed with pt at length. Pt understands risks and benefits or current treatment plan and medications, and accepts the treatment and medication with any possible risks.  Pt asks appropriate questions, which were answered. Pt was instructed to call with any concerns or problems. I have reviewed the note documented by the scribe. The services provided are my own. The documentation is accurate This note will not be viewable in [employer].

## 2018-10-22 NOTE — LETTER
10/23/2018 6:30 AM 
 
Ms. Apollo Geiger [address]  [facility] [address] Dear Apollo Geiger: 
 
Please find your most recent results below. Resulted Orders LIPID PANEL Result Value Ref Range Cholesterol, total 190 100 - 199 mg/dL Triglyceride 68 0 - 149 mg/dL HDL Cholesterol 70 >39 mg/dL VLDL, calculated 14 5 - 40 mg/dL LDL, calculated 106 (H) 0 - 99 mg/dL Narrative Performed at:  [address]  [id number] : Brittany Milner MD, Phone:  [phone] CBC W/O DIFF Result Value Ref Range WBC 7.0 3.4 - 10.8 x10E3/uL  
 RBC 3.78 3.77 - 5.28 x10E6/uL HGB 11.1 11.1 - 15.9 g/dL HCT 32.2 (L) 34.0 - 46.6 % MCV 85 79 - 97 fL  
 MCH 29.4 26.6 - 33.0 pg  
 MCHC 34.5 31.5 - 35.7 g/dL  
 RDW 14.2 12.3 - 15.4 % PLATELET 656 324 - 235 x10E3/uL Narrative Performed at:  [address]  [id number] : Brittany Milner MD, Phone:  [phone] TSH 3RD GENERATION Result Value Ref Range TSH 1.610 0.450 - 4.500 uIU/mL Narrative Performed at:  [address]  [id number] : Brittany Milner MD, Phone:  [phone] VITAMIN D, 25 HYDROXY Result Value Ref Range VITAMIN D, 25-HYDROXY 35.4 30.0 - 100.0 ng/mL Comment:  
   Vitamin D deficiency has been defined by the [facility] practice guideline as a 
level of serum 25-OH vitamin D less than 20 ng/mL (1,2). The Endocrine Society went on to further define vitamin D 
insufficiency as a level between 21 and 29 ng/mL (2). 1. IOM ([place] of [employer]). 2010. Dietary reference 
   intakes for calcium and D. [facility]: The 
   [employer].  
2. Zeina MF, Porsche CHRISTIAN, Marcelle DEGROOT, et al. 
   Evaluation, treatment, and prevention of vitamin D 
 deficiency: an Endocrine Society clinical practice 
   guideline. JCEM. 2011 Jul; 96(7):1911-30. Narrative Performed at:  [address]  [id number] : Salinas Young MD, Phone:  [phone] HEMOGLOBIN A1C WITH EAG Result Value Ref Range Hemoglobin A1c 5.9 (H) 4.8 - 5.6 % Comment:  
            Prediabetes: 5.7 - 6.4 Diabetes: >6.4 Glycemic control for adults with diabetes: <7.0 Estimated average glucose 123 mg/dL Narrative Performed at:  [address]  [id number] : Salinas Young MD, Phone:  [phone] METABOLIC PANEL, COMPREHENSIVE Result Value Ref Range Glucose 95 65 - 99 mg/dL BUN 25 8 - 27 mg/dL Creatinine 1.06 (H) 0.57 - 1.00 mg/dL GFR est non-AA 54 (L) >59 mL/min/1.73 GFR est AA 63 >59 mL/min/1.73  
 BUN/Creatinine ratio 24 12 - 28 Sodium 144 134 - 144 mmol/L Potassium 3.6 3.5 - 5.2 mmol/L Chloride 101 96 - 106 mmol/L  
 CO2 25 20 - 29 mmol/L Calcium 9.9 8.7 - 10.3 mg/dL Protein, total 6.9 6.0 - 8.5 g/dL Albumin 4.4 3.6 - 4.8 g/dL GLOBULIN, TOTAL 2.5 1.5 - 4.5 g/dL A-G Ratio 1.8 1.2 - 2.2 Bilirubin, total 0.3 0.0 - 1.2 mg/dL Alk. phosphatase 81 39 - 117 IU/L  
 AST (SGOT) 21 0 - 40 IU/L  
 ALT (SGPT) 19 0 - 32 IU/L Narrative Performed at:  [address]  [id number] : Salinas Young MD, Phone:  [phone] FERRITIN Result Value Ref Range Ferritin 139 15 - 150 ng/mL Narrative Performed at:  [address]  [id number] : Salinas Young MD, Phone:  [phone] IRON PROFILE Result Value Ref Range TIBC 311 250 - 450 ug/dL UIBC 244 118 - 369 ug/dL Iron 67 27 - 139 ug/dL Iron % saturation 22 15 - 55 % Narrative Performed at:  [place] [address] [address], [place], [place]  [id number] : Elvira Ferreira MD, Phone:  [phone] RECOMMENDATIONS: 
None. Keep up the good work! Please call me if you have any questions: [phone] Sincerely, 
 
 
Pam Wilson MD

## 2018-10-22 NOTE — PATIENT INSTRUCTIONS
Medicare Wellness Visit, Female The best way to live healthy is to have a lifestyle where you eat a well-balanced diet, exercise regularly, limit alcohol use, and quit all forms of tobacco/nicotine, if applicable. Regular preventive services are another way to keep healthy. Preventive services (vaccines, screening tests, monitoring & exams) can help personalize your care plan, which helps you manage your own care. Screening tests can find health problems at the earliest stages, when they are easiest to treat. [facility] follows the current, evidence-based guidelines published by the [facility] ([facility]) when recommending preventive services for our patients. Because we follow these guidelines, sometimes recommendations change over time as research supports it. (For example, mammograms used to be recommended annually. Even though Medicare will still pay for an annual mammogram, the newer guidelines recommend a mammogram every two years for women of average risk.) Of course, you and your doctor may decide to screen more often for some diseases, based on your risk and your health status. Preventive services for you include: - Medicare offers their members a free annual wellness visit, which is time for you and your primary care provider to discuss and plan for your preventive service needs. Take advantage of this benefit every year! 
-All adults over the age of 72 should receive the recommended pneumonia vaccines. Current USPSTF guidelines recommend a series of two vaccines for the best pneumonia protection.  
-All adults should have a flu vaccine yearly and a tetanus vaccine every 10 years. All adults age 61 and older should receive a shingles vaccine once in their lifetime.   
-A bone mass density test is recommended when a woman turns 65 to screen for osteoporosis. This test is only recommended one time, as a screening. Some providers will use this same test as a disease monitoring tool if you already have osteoporosis. -All adults age 38-68 who are overweight should have a diabetes screening test once every three years.  
-Other screening tests and preventive services for persons with diabetes include: an eye exam to screen for diabetic retinopathy, a kidney function test, a foot exam, and stricter control over your cholesterol.  
-Cardiovascular screening for adults with routine risk involves an electrocardiogram (ECG) at intervals determined by your doctor.  
-Colorectal cancer screenings should be done for adults age 54-65 with no increased risk factors for colorectal cancer. There are a number of acceptable methods of screening for this type of cancer. Each test has its own benefits and drawbacks. Discuss with your doctor what is most appropriate for you during your annual wellness visit. The different tests include: colonoscopy (considered the best screening method), a fecal occult blood test, a fecal DNA test, and sigmoidoscopy. -Breast cancer screenings are recommended every other year for women of normal risk, age 54-69. 
-Cervical cancer screenings for women over age 72 are only recommended with certain risk factors.  
-All adults born between [facility] should be screened once for Hepatitis C. Here is a list of your current Health Maintenance items (your personalized list of preventive services) with a due date: 
Health Maintenance Due Topic Date Due  Shingles Vaccine (1 of 2) 09/13/2001  Bone Mineral Density   09/13/2016  Flu Vaccine  08/01/2018  Glaucoma Screening   10/24/2018 Medicare Part B Preventive Services Guidelines/Limitations Date last completed and Frequency Due Date Bone Mass Measurement 
(age 72 & older, biennial) Requires diagnosis related to osteoporosis or estrogen deficiency.  Biennial benefit unless patient has history of long-term glucocorticoid tx or baseline is needed because initial test was by other method  Recommended every 2 years Due now Cardiovascular Screening Blood Tests (every 5 years) Total cholesterol, HDL, Triglycerides Order as a panel if possible Completed 10/2017 
  
Recommended annually Due now Colorectal Cancer Screening 
-Fecal occult blood test (annual) -Flexible sigmoidoscopy (5y) 
-Screening colonoscopy (10y) -Barium Enema   Completed  12/22/16 with Dr Isabel Najera 
  
Recommended repeat in 3 years Due 12/2019 Counseling to Prevent Tobacco Use (up to 8 sessions per year) - Counseling greater than 3 and up to 10 minutes - Counseling greater than 10 minutes Patients must be asymptomatic of tobacco-related conditions to receive as preventive service n/a n/a Diabetes Screening Tests (at least every 3 years, Medicare covers annually or at 6-month intervals for prediabetic patients) 
  Fasting blood sugar (FBS) or glucose tolerance test (GTT) Patient must be diagnosed with one of the following: 
-Hypertension, Dyslipidemia, obesity, previous impaired FBS or GTT 
Or any two of the following: overweight, FH of diabetes, age ? 72, history of gestational diabetes, birth of baby weighing more than 9 pounds Completed 4/2018 with A1C 5.6 
  
Recommended every 3 years for non-diabetics, typically checked annually Due 4/2019 Diabetes Self-Management Training (DSMT) (no USPSTF recommendation) Requires referral by treating physician for patient with diabetes or renal disease. 10 hours of initial DSMT session of no less than 30 minutes each in a continuous 12-month period.  2 hours of follow-up DSMT in subsequent years. n/a n/a Glaucoma Screening (no USPSTF recommendation) Diabetes mellitus, family history, , age 48 or over,  American, age 72 or over Completed 3/2018 
  
Recommended annually Due 3/2019 Human Immunodeficiency Virus (HIV) Screening (annually for increased risk patients) HIV-1 and HIV-2 by EIA, LOGAN, rapid antibody test, or oral mucosa transudate Patient must be at increased risk for HIV infection per USPSTF guidelines or pregnant.  Tests covered annually for patients at increased risk.  Pregnant patients may receive up to 3 test during pregnancy. n/a  N/A Medical Nutrition Therapy (MNT) (for diabetes or renal disease not recommended schedule) Requires referral by treating physician for patient with diabetes or renal disease.  Can be provided in same year as diabetes self-management training (DSMT), and CMS recommends medical nutrition therapy take place after DSMT.  Up to 3 hours for initial year and 2 hours in subsequent years. n/a  N/A  
         
Seasonal Influenza Vaccination (annually)   Completed 10/2018 
  
Recommended Annually Due annually Pneumococcal Vaccination (once after 65)   Pneumococcal 23 -10/23/17 Recommended once over the age of 72 
  
Prevnar 15 -10/18/16Recommended once over the age of 72 Complete 
  
  
  
  
Complete Hepatitis B Vaccinations (if medium/high risk) Medium/high risk factors:  End-stage renal disease, Hemophiliacs who received Factor VIII or IX concentrates, Clients of institutions for the mentally retarded, Persons who live in the same house as a HepB virus carrier, Homosexual men, Illicit injectable drug abusers. n/a n/a Screening Mammography (biennial age 54-69)? Annually (age 36 or over) Completed 8/2017 
  
Recommended annually Due now Screening Pap Tests and Pelvic Examination (up to age 79 and after 79 if unknown history or abnormal study last 10 years) Every 24 months except high risk Completed 8/2017 
  
Recommended every 2 years Due 8/2019 Ultrasound Screening for Abdominal Aortic Aneurysm (AAA) (once) Patient must be referred through IPPE and not have had a screening for abdominal aortic aneurysm before under Medicare.  Limited to patients who meet one of the following criteria: 
 - Men who are 73-68 years old and have smoked more than 100 cigarettes in their lifetime. 
-Anyone with a FH of AAA 
-Anyone recommended for screening by USPSTF n/a n/a  
  
  
 
 
Please bring in a copy of your advanced directive to your next office visit so we can have a copy on file.

## 2018-10-23 LAB
25(OH)D3+25(OH)D2 SERPL-MCNC: 35.4 NG/ML (ref 30–100)
ALBUMIN SERPL-MCNC: 4.4 G/DL (ref 3.6–4.8)
ALBUMIN/GLOB SERPL: 1.8 {RATIO} (ref 1.2–2.2)
ALP SERPL-CCNC: 81 IU/L (ref 39–117)
ALT SERPL-CCNC: 19 IU/L (ref 0–32)
AST SERPL-CCNC: 21 IU/L (ref 0–40)
BILIRUB SERPL-MCNC: 0.3 MG/DL (ref 0–1.2)
BUN SERPL-MCNC: 25 MG/DL (ref 8–27)
BUN/CREAT SERPL: 24 (ref 12–28)
CALCIUM SERPL-MCNC: 9.9 MG/DL (ref 8.7–10.3)
CHLORIDE SERPL-SCNC: 101 MMOL/L (ref 96–106)
CHOLEST SERPL-MCNC: 190 MG/DL (ref 100–199)
CO2 SERPL-SCNC: 25 MMOL/L (ref 20–29)
CREAT SERPL-MCNC: 1.06 MG/DL (ref 0.57–1)
ERYTHROCYTE [DISTWIDTH] IN BLOOD BY AUTOMATED COUNT: 14.2 % (ref 12.3–15.4)
EST. AVERAGE GLUCOSE BLD GHB EST-MCNC: 123 MG/DL
FERRITIN SERPL-MCNC: 139 NG/ML (ref 15–150)
GLOBULIN SER CALC-MCNC: 2.5 G/DL (ref 1.5–4.5)
GLUCOSE SERPL-MCNC: 95 MG/DL (ref 65–99)
HBA1C MFR BLD: 5.9 % (ref 4.8–5.6)
HCT VFR BLD AUTO: 32.2 % (ref 34–46.6)
HDLC SERPL-MCNC: 70 MG/DL
HGB BLD-MCNC: 11.1 G/DL (ref 11.1–15.9)
IRON SATN MFR SERPL: 22 % (ref 15–55)
IRON SERPL-MCNC: 67 UG/DL (ref 27–139)
LDLC SERPL CALC-MCNC: 106 MG/DL (ref 0–99)
MCH RBC QN AUTO: 29.4 PG (ref 26.6–33)
MCHC RBC AUTO-ENTMCNC: 34.5 G/DL (ref 31.5–35.7)
MCV RBC AUTO: 85 FL (ref 79–97)
PLATELET # BLD AUTO: 349 X10E3/UL (ref 150–379)
POTASSIUM SERPL-SCNC: 3.6 MMOL/L (ref 3.5–5.2)
PROT SERPL-MCNC: 6.9 G/DL (ref 6–8.5)
RBC # BLD AUTO: 3.78 X10E6/UL (ref 3.77–5.28)
SODIUM SERPL-SCNC: 144 MMOL/L (ref 134–144)
TIBC SERPL-MCNC: 311 UG/DL (ref 250–450)
TRIGL SERPL-MCNC: 68 MG/DL (ref 0–149)
TSH SERPL DL<=0.005 MIU/L-ACNC: 1.61 UIU/ML (ref 0.45–4.5)
UIBC SERPL-MCNC: 244 UG/DL (ref 118–369)
VLDLC SERPL CALC-MCNC: 14 MG/DL (ref 5–40)
WBC # BLD AUTO: 7 X10E3/UL (ref 3.4–10.8)

## 2018-11-26 RX ORDER — BUDESONIDE AND FORMOTEROL FUMARATE DIHYDRATE 160; 4.5 UG/1; UG/1
2 AEROSOL RESPIRATORY (INHALATION) 2 TIMES DAILY
Qty: 1 INHALER | Refills: 0 | Status: SHIPPED | OUTPATIENT
Start: 2018-11-26 | End: 2019-11-05

## 2019-01-04 ENCOUNTER — OFFICE VISIT (OUTPATIENT)
Dept: INTERNAL MEDICINE CLINIC | Age: 69
End: 2019-01-04

## 2019-01-04 VITALS
TEMPERATURE: 98 F | HEIGHT: 63 IN | RESPIRATION RATE: 16 BRPM | DIASTOLIC BLOOD PRESSURE: 74 MMHG | WEIGHT: 165 LBS | OXYGEN SATURATION: 99 % | BODY MASS INDEX: 29.23 KG/M2 | SYSTOLIC BLOOD PRESSURE: 126 MMHG | HEART RATE: 70 BPM

## 2019-01-04 DIAGNOSIS — R01.1 MURMUR, CARDIAC: ICD-10-CM

## 2019-01-04 DIAGNOSIS — R05.9 COUGH: ICD-10-CM

## 2019-01-04 DIAGNOSIS — R05.9 COUGH: Primary | ICD-10-CM

## 2019-01-04 DIAGNOSIS — E78.2 MIXED HYPERLIPIDEMIA: ICD-10-CM

## 2019-01-04 DIAGNOSIS — R35.0 URINARY FREQUENCY: Primary | ICD-10-CM

## 2019-01-04 DIAGNOSIS — I10 ESSENTIAL HYPERTENSION: ICD-10-CM

## 2019-01-04 NOTE — PROGRESS NOTES
HISTORY OF PRESENT ILLNESS  Yo Erickson is a 80 y.o. female. HPI  Last here 10/22/18. Pt is here to f/u on chronic conditions. Pt had 2 UTIs in last month  She went to [facility], was given bactrim, felt better  Then before [place] she had another one and was given macrobid, again felt normal  However both times when urine was sent for culture, it was negative for UTIs  Her sx were burning with urination, urgency, L sided lower back pain, bladder pressure  She also had lethargy, which she states felt like it got better with abx  Pt states that recently when she has to urinate, she has to bend very far forward on the toilet in order for anything to come out  Last episode before these ones were a year before  Provided referral for Dr Nunu Slade (uro gyn)  Will give vaginal estrogen to try before she sees uro gyn      BP today is 147/84, will repeat this today  Continues on losartan-HCTZ 100-25mg and norvasc 2.5mg daily   Pt took her meds this AM  Denies any orthostasis      Wt is 163 lbs --down 2 lbs x lov    Discussed continued diet and w/l      Reviewed labs 4/18  Will get labs today     Pt has not seen a derm recently  Advised pt to get an annual skin check  Provided information for Dr Renata Cunningham (derm)     Continues singulair daily  Also uses allegra  No longer using flovent  She also has albuterol to use prn for breathing- stable  Lov, pt c/o a strangling cough  She states she cannot breath when this happens  Lov, provided breo - this did not help  Pt also tried symbicort and this did not help either  She uses her albuterol when sx occur, which helps some  Advised her to use flonase  Ordered CXR  Will give inhaler  Provided info for pulm  Recall advair caused a rash     On exam, cardiac murmur was heard  Ordered ECHO     ACP not on file.  SDM is her .   Pt has this information at home and will bring it in.      PREVENTIVE:    Colonoscopy: 12/22/16, Dr. Cody Gaytan, repeat 3 years, du 12/19  Pap:  Jodi, 8/31/17, every other year  Mammogram: Dr. Ann Lou, 8/31/17, negative  DEXA: due, discussed, ordered, will schedule, reminded again, pt will try to schedule this with mammo  AAA: no FMHX  Tdap: 5/13/2013  Pneumovax: 10/23/17  [id number]: 10/18/2016  Zostavax: 9/25/2015  Shingrix: ordered 10/22/18   Flu shot: Fall 2018  A1c: 4/16 6.1, 10/16 6.2, 10/17 5.9, 4/18 5.6, 10/18 5.9  Eye exam: Dr. Lynnette Toledo VSN [id number]  EKG: 10/23/17, nsr    Patient Active Problem List    Diagnosis Date Noted    Cyst of tendon sheath 04/09/2012    Depression, reactive 06/27/2011    HTN (hypertension) 12/14/2009    Asthma 12/14/2009    Allergic rhinitis 12/14/2009     Current Outpatient Medications   Medication Sig Dispense Refill    budesonide-formoterol (SYMBICORT) 160-4.5 mcg/actuation HFAA Take 2 Puffs by inhalation two (2) times a day. 1 Inhaler 0    fluticasone-vilanterol (BREO ELLIPTA) 200-25 mcg/dose inhaler Take 1 Puff by inhalation daily. 1 Inhaler 0    montelukast (SINGULAIR) 10 mg tablet take 1 tablet by mouth once daily 90 Tab 1    amLODIPine (NORVASC) 2.5 mg tablet take 1 tablet by mouth once daily 90 Tab 1    fluticasone (FLOVENT HFA) 220 mcg/actuation inhaler Take 1 Puff by inhalation two (2) times a day. 4 Inhaler 3    albuterol (PROAIR HFA) 90 mcg/actuation inhaler INHALE 2 PUFFS BY MOUTH EVERY 4 HOURS AS NEEDED FOR WHEEZING 3 Inhaler 3    albuterol (PROVENTIL VENTOLIN) 2.5 mg /3 mL (0.083 %) nebulizer solution 3 mL by Nebulization route every four (4) hours as needed for Wheezing.  1 Package 11    losartan-hydrochlorothiazide (HYZAAR) 100-25 mg per tablet take 1 tablet by mouth daily 30 Tab 6     Past Surgical History:   Procedure Laterality Date    HX ORTHOPAEDIC      back      Lab Results   Component Value Date/Time    WBC 7.0 10/22/2018 10:43 AM    HGB 11.1 10/22/2018 10:43 AM    HCT 32.2 (L) 10/22/2018 10:43 AM    PLATELET 692 95/87/3973 10:43 AM    MCV 85 10/22/2018 10:43 AM     Lab Results   Component Value Date/Time    Cholesterol, total 190 10/22/2018 10:43 AM    HDL Cholesterol 70 10/22/2018 10:43 AM    LDL, calculated 106 (H) 10/22/2018 10:43 AM    Triglyceride 68 10/22/2018 10:43 AM     Lab Results   Component Value Date/Time    GFR est non-AA 54 (L) 10/22/2018 10:43 AM    GFR est AA 63 10/22/2018 10:43 AM    Creatinine 1.06 (H) 10/22/2018 10:43 AM    BUN 25 10/22/2018 10:43 AM    Sodium 144 10/22/2018 10:43 AM    Potassium 3.6 10/22/2018 10:43 AM    Chloride 101 10/22/2018 10:43 AM    CO2 25 10/22/2018 10:43 AM        Review of Systems   Respiratory: Positive for cough. Negative for shortness of breath. Cardiovascular: Negative for chest pain. Genitourinary: Positive for dysuria and frequency. Physical Exam   Constitutional: She is oriented to person, place, and time. She appears well-developed and well-nourished. No distress. HENT:   Head: Normocephalic and atraumatic. Right Ear: External ear normal.   Left Ear: External ear normal.   Mouth/Throat: Oropharynx is clear and moist. No oropharyngeal exudate. Eyes: Conjunctivae and EOM are normal. Right eye exhibits no discharge. Left eye exhibits no discharge. Neck: Normal range of motion. Neck supple. Cardiovascular: Normal rate and regular rhythm. Exam reveals no gallop and no friction rub. Murmur (1/6) heard. Pulmonary/Chest: Effort normal and breath sounds normal. No respiratory distress. She has no wheezes. She has no rales. She exhibits no tenderness. Musculoskeletal: Normal range of motion. She exhibits no edema, tenderness or deformity. Lymphadenopathy:     She has no cervical adenopathy. Neurological: She is alert and oriented to person, place, and time. Coordination normal.   Skin: Skin is warm and dry. No rash noted. She is not diaphoretic. No erythema. No pallor. Psychiatric: She has a normal mood and affect. Her behavior is normal.       ASSESSMENT and PLAN    ICD-10-CM ICD-9-CM    1.  Urinary frequency    Negative urine culture, will set up with uro gyn, will give vaginal estrogen 2x per week to improve sx   R35.0 788.41 REFERRAL TO FEMALE PELVIC MEDICINE AND RECONSTRUCTIVE SURGERY   2. Essential hypertension    BP initially elevated, repeat improved, has been well controlled at other office visits, continue losartan HCTZ and norvasc, no change to dose   I10 401.9    3. Cough    Chronic cough, will get CXR, ill try spiriva inhaler, provided samples today, start flonase OTC   R05 786.2 XR CHEST PA LAT      REFERRAL TO PULMONARY DISEASE   4. Murmur, cardiac    Check ECHO   R01.1 785.2 2D ECHO COMPLETE ADULT (TTE) W OR WO CONTR   5. Mixed hyperlipidemia    Diet controlled   E78.2 272.2         Scribed by Mckinley Soto of [address] [place] [address] [address], as dictated by Dr. Hilda Pulido. Current diagnosis and concerns discussed with pt at length. Pt understands risks and benefits or current treatment plan and medications, and accepts the treatment and medication with any possible risks. Pt asks appropriate questions, which were answered. Pt was instructed to call with any concerns or problems. I have reviewed the note documented by the scribe. The services provided are my own.   The documentation is accurate

## 2019-01-07 ENCOUNTER — TELEPHONE (OUTPATIENT)
Dept: INTERNAL MEDICINE CLINIC | Age: 69
End: 2019-01-07

## 2019-01-07 NOTE — TELEPHONE ENCOUNTER
Pt state the Amoro inhaler worked well for her. She would like a script called in for this to [facility] in [facility] on file.   Can she get this prior to Wed night as she is going out of town on [place], 1-10-19

## 2019-01-10 RX ORDER — ESTRADIOL 0.1 MG/G
2 CREAM VAGINAL DAILY
Qty: 42.5 G | Refills: 0 | Status: SHIPPED | OUTPATIENT
Start: 2019-01-10 | End: 2019-11-05

## 2019-01-10 NOTE — TELEPHONE ENCOUNTER
PCP: Henry Sharp MD    Last appt: 1/4/2019  Future Appointments   Date Time Provider Joe Levine   1/18/2019  1:00 PM ECHOCARDIOGRAM ROOM [address]   4/17/2019  9:15 AM Henry Sharp MD [facility] 87       Requested Prescriptions     Pending Prescriptions Disp Refills    estradiol (ESTRACE) 0.01 % (0.1 mg/gram) vaginal cream 42.5 g 0     Sig: daily.

## 2019-01-22 DIAGNOSIS — J45.40 MODERATE PERSISTENT ASTHMA WITHOUT COMPLICATION: ICD-10-CM

## 2019-01-22 RX ORDER — LOSARTAN POTASSIUM AND HYDROCHLOROTHIAZIDE 25; 100 MG/1; MG/1
TABLET ORAL
Qty: 90 TAB | Refills: 1 | Status: SHIPPED | OUTPATIENT
Start: 2019-01-22 | End: 2019-09-16 | Stop reason: SDUPTHER

## 2019-01-22 RX ORDER — MONTELUKAST SODIUM 10 MG/1
TABLET ORAL
Qty: 90 TAB | Refills: 1 | Status: SHIPPED | OUTPATIENT
Start: 2019-01-22 | End: 2019-09-16 | Stop reason: SDUPTHER

## 2019-01-22 RX ORDER — ALBUTEROL SULFATE 90 UG/1
AEROSOL, METERED RESPIRATORY (INHALATION)
Qty: 1 INHALER | Refills: 3 | Status: SHIPPED | OUTPATIENT
Start: 2019-01-22 | End: 2019-10-07 | Stop reason: SDUPTHER

## 2019-02-05 ENCOUNTER — HOSPITAL ENCOUNTER (OUTPATIENT)
Dept: NON INVASIVE DIAGNOSTICS | Age: 69
Discharge: HOME | End: 2019-02-05
Attending: INTERNAL MEDICINE
Payer: MEDICARE

## 2019-02-05 DIAGNOSIS — R01.1 MURMUR, CARDIAC: ICD-10-CM

## 2019-02-05 LAB
ECHO AV CUSP MM: 1.96 CM
ECHO AV PEAK GRADIENT: 6 MMHG
ECHO AV PEAK VELOCITY: 122.12 CM/S
ECHO EST RA PRESSURE: 10 MMHG
ECHO LA AREA 4C: 11.4 CM2
ECHO LA VOL 4C: 24.7 ML (ref 22–52)
ECHO LV E' LATERAL VELOCITY: 6.21 CM/S
ECHO LV E' SEPTAL VELOCITY: 6.8 CM/S
ECHO LV INTERNAL DIMENSION DIASTOLIC MMODE: 5.02 CM
ECHO LV INTERNAL DIMENSION SYSTOLIC MMODE: 3.57 CM
ECHO LV IVSD MMODE: 1.15 CM
ECHO LV POSTERIOR WALL DIASTOLIC MMODE: 1.18 CM
ECHO LVOT PEAK GRADIENT: 3.1 MMHG
ECHO LVOT PEAK VELOCITY: 87.99 CM/S
ECHO MV A VELOCITY: 92.6 CM/S
ECHO MV E DECELERATION TIME (DT): 232.5 MS
ECHO MV E VELOCITY: 0.86 CM/S
ECHO MV E/A RATIO: 0.01
ECHO MV E/E' LATERAL: 0.14
ECHO MV E/E' RATIO (AVERAGED): 0.13
ECHO MV E/E' SEPTAL: 0.13
ECHO MV REGURGITANT PEAK GRADIENT: 4.7 MMHG
ECHO MV REGURGITANT PEAK VELOCITY: 108.3 CM/S
ECHO PULMONARY ARTERY SYSTOLIC PRESSURE (PASP): 30.6 MMHG
ECHO PV MAX VELOCITY: 56.5 CM/S
ECHO PV PEAK GRADIENT: 1.3 MMHG
ECHO RIGHT VENTRICULAR SYSTOLIC PRESSURE (RVSP): 30.6 MMHG
ECHO RV INTERNAL DIMENSION: 2.48 CM
ECHO TV A WAVE: 26.78 CM/S
ECHO TV E WAVE: 39.32 CM/S
ECHO TV EROA: 0.7
ECHO TV REGURGITANT MAX VELOCITY: 227.14 CM/S
ECHO TV REGURGITANT PEAK GRADIENT: 20.6 MMHG

## 2019-02-05 PROCEDURE — 93306 TTE W/DOPPLER COMPLETE: CPT

## 2019-02-05 NOTE — PROGRESS NOTES
Let her know heart function is normal  
 
One heart chamber slightly enlarged and slight leaky heart valve--continue losartan-hct to maintain good bp control to protect heart

## 2019-02-06 NOTE — PROGRESS NOTES
Called, spoke with Pt. Two pt identifiers confirmed. Pt informed per Dr. Sharon Ahn heart function is normal.  
Pt informed per Dr. Sharon Ahn one heart chamber is slightly enlarged and a heart valve is slightly leaky. Pt informed per Dr. Sharon Ahn to continue on the Losartan-hct and maintain good bp and that helps protect the heart. Pt verbalized understanding of information discussed w/ no further questions at this time.

## 2019-03-27 NOTE — TELEPHONE ENCOUNTER
PCP: Abdullahi Rae MD    Last appt: 1/4/2019  Future Appointments   Date Time Provider Joe Levine   5/6/2019  1:00 PM ED [facility] [address] [facility]   5/6/2019  2:45 PM Abdullahi Rae MD [facility] 87       Requested Prescriptions     Pending Prescriptions Disp Refills    umeclidinium-vilanterol (ANORO ELLIPTA) 62.5-25 mcg/actuation inhaler 1 Inhaler 2     Sig: Take 1 Puff by inhalation daily.

## 2019-05-06 ENCOUNTER — HOSPITAL ENCOUNTER (OUTPATIENT)
Dept: CT IMAGING | Age: 69
Discharge: HOME | End: 2019-05-06
Attending: OBSTETRICS & GYNECOLOGY
Payer: MEDICARE

## 2019-05-06 ENCOUNTER — OFFICE VISIT (OUTPATIENT)
Dept: INTERNAL MEDICINE CLINIC | Age: 69
End: 2019-05-06

## 2019-05-06 ENCOUNTER — HOSPITAL ENCOUNTER (OUTPATIENT)
Dept: LAB | Age: 69
Discharge: HOME | End: 2019-05-06
Payer: MEDICARE

## 2019-05-06 VITALS
SYSTOLIC BLOOD PRESSURE: 107 MMHG | TEMPERATURE: 98 F | BODY MASS INDEX: 27.82 KG/M2 | DIASTOLIC BLOOD PRESSURE: 64 MMHG | HEIGHT: 63 IN | HEART RATE: 83 BPM | OXYGEN SATURATION: 97 % | RESPIRATION RATE: 16 BRPM | WEIGHT: 157 LBS

## 2019-05-06 DIAGNOSIS — N39.0 RECURRENT UTI: ICD-10-CM

## 2019-05-06 DIAGNOSIS — J45.40 MODERATE PERSISTENT ASTHMA WITHOUT COMPLICATION: ICD-10-CM

## 2019-05-06 DIAGNOSIS — R35.0 URINARY FREQUENCY: ICD-10-CM

## 2019-05-06 DIAGNOSIS — R73.01 IFG (IMPAIRED FASTING GLUCOSE): ICD-10-CM

## 2019-05-06 DIAGNOSIS — E78.2 MIXED HYPERLIPIDEMIA: ICD-10-CM

## 2019-05-06 DIAGNOSIS — I10 ESSENTIAL HYPERTENSION: Primary | ICD-10-CM

## 2019-05-06 DIAGNOSIS — N28.1 RENAL CYST, LEFT: ICD-10-CM

## 2019-05-06 DIAGNOSIS — E66.3 OVERWEIGHT: ICD-10-CM

## 2019-05-06 PROCEDURE — 36415 COLL VENOUS BLD VENIPUNCTURE: CPT

## 2019-05-06 PROCEDURE — 74178 CT ABD&PLV WO CNTR FLWD CNTR: CPT

## 2019-05-06 PROCEDURE — 82565 ASSAY OF CREATININE: CPT

## 2019-05-06 PROCEDURE — 80053 COMPREHEN METABOLIC PANEL: CPT

## 2019-05-06 PROCEDURE — 74011636320 HC RX REV CODE- 636/320: Performed by: OBSTETRICS & GYNECOLOGY

## 2019-05-06 PROCEDURE — 83036 HEMOGLOBIN GLYCOSYLATED A1C: CPT

## 2019-05-06 RX ORDER — SODIUM CHLORIDE 0.9 % (FLUSH) 0.9 %
10 SYRINGE (ML) INJECTION
Status: COMPLETED | OUTPATIENT
Start: 2019-05-06 | End: 2019-05-06

## 2019-05-06 RX ADMIN — IOPAMIDOL 100 ML: 755 INJECTION, SOLUTION INTRAVENOUS at 13:31

## 2019-05-06 RX ADMIN — Medication 10 ML: at 13:31

## 2019-05-06 NOTE — PROGRESS NOTES
HISTORY OF PRESENT ILLNESS Tito Boudreaux is a 80 y.o. female. HPI Last here 1/4/19. Pt is here to f/u on chronic conditions. 
   
BP today is 146/72, will repeat this today BP at home has been around 130/70 Pt thinks that her BP is up because she had to take prednisone today for her CT Continues on losartan-HCTZ 100-25mg and norvasc 2.5mg daily  
Pt took her meds this AM 
Denies any orthostasis Wt today is 157 lbs --down 8 lbs x lov Pt is walking 2 miles daily Discussed continued diet and w/l  
  
Reviewed labs 10/18 
  
 
Previously provided information for Dr Kanchan Mcneal (derm) Pt saw Dr Jessica Hall (uro gyn) for recurrent UTI sx Reviewed notes 1/31/19: prolapsed bladder, recurrent UTI, discussed PT and pessary, US and cystoscopy She did not have a cystoscopy however Pt had a US done, stated that one kidney had a cyst and the other had something that was likely another cyst but she wanted to f/u on this Pt was given imvexxy which has been helping with dryness Reviewed CT abd 5/19: The visualized portions of the lung bases are clear. The precontrast images demonstrate a vague calcification in the mid pole right kidney with a nonobstructing 5 mm stone left kidney. Following contrast administration, there is uniform enhancement of both kidneys. There are multiple round lesions within both kidneys. The largest measures 8 mm in the upper pole medially. These are too small to characterize. There is an exophytic 5 mm lesion posteriorly within the left kidney which is intermediate density but also too small to characterize. There are no focal hepatic lesions. No gallstones or ductal dilatation. The pancreas is not enlarged or inflamed. The spleen is normal in size. At the medial margin of the spleen is a 2 cm lesion measuring 14 mm. This is compatible with a cyst. It does not abut the kidney. Adrenal glands are normal in appearance. The aorta tapers without aneurysm.   There is no retroperitoneal adenopathy or mass. There is no ascites or free intraperitoneal air. The bowel is normal. The appendix is within normal limits. There is IUD within the uterus. Round high density lesion posteriorly likely represents a fibroid. There is no pelvic mass or adenopathy. Next visit scheduled for next week to discuss results with riley 
  
Continues singulair daily Also uses allegra No longer using flovent She also has albuterol to use prn for breathing- stable Lov, pt c/o a strangling cough Breo and singulair did not help with this Pt has not yet been to see a pulmonologist but will schedule Reviewed CXR 1/19: nl 
Recall advair caused a rash  
  
Reviewed ECHO 2/19: EF 56-60%, mild dilation of L atrial cavity Reviewed DEXA 1/19: osteopenia Discussed vit d  For tx 
  
ACP not on file.  SDM is her . Pt has this information at home and will bring it in. 
   
PREVENTIVE:   
Colonoscopy: 12/22/16, Dr. Orin Herrera, repeat 3 years, due 12/19 Pap: Dr. Shannon Dalton, 8/31/17, every other year Mammogram: Dr. Shannon Dalton, 1/19, will get report for review DEXA: 1/28/19, osteopenia AAA: [facility] Tdap: 5/13/2013 Pneumovax: 10/23/17 [id number]: 10/18/2016 Zostavax: 9/25/2015 Shingrix: ordered 10/22/18, reminded Flu shot: Fall 2018 A1c: 4/16 6.1, 10/16 6.2, 10/17 5.9, 4/18 5.6, 10/18 5.9 Eye exam: Dr. Za Horner, due Lipids: 10/18 TAT 515 EKG: 10/23/17, nsr Patient Active Problem List  
 Diagnosis Date Noted  Cyst of tendon sheath 04/09/2012  Depression, reactive 06/27/2011  
 HTN (hypertension) 12/14/2009  Asthma 12/14/2009  Allergic rhinitis 12/14/2009 Current Outpatient Medications Medication Sig Dispense Refill  umeclidinium-vilanterol (ANORO ELLIPTA) 62.5-25 mcg/actuation inhaler Take 1 Puff by inhalation daily.  1 Inhaler 2  
 amLODIPine (NORVASC) 2.5 mg tablet take 1 tablet by mouth once daily 90 Tab 0  
  losartan-hydroCHLOROthiazide (HYZAAR) 100-25 mg per tablet take 1 tablet by mouth once daily 90 Tab 1  
 montelukast (SINGULAIR) 10 mg tablet take 1 tablet by mouth once daily 90 Tab 1  
 albuterol (VENTOLIN HFA) 90 mcg/actuation inhaler inhale 2 puffs by mouth every 4 hours if needed for wheezing 1 Inhaler 3  
 estradiol (ESTRACE) 0.01 % (0.1 mg/gram) vaginal cream Insert 2 g into vagina daily. 2 times per week 42.5 g 0  
 conjugated estrogens (PREMARIN) 0.625 mg/gram vaginal cream Insert 0.5 g into vagina two (2) times a week. 2 times per week 30 g 1  
 budesonide-formoterol (SYMBICORT) 160-4.5 mcg/actuation HFAA Take 2 Puffs by inhalation two (2) times a day. 1 Inhaler 0  
 fluticasone-vilanterol (BREO ELLIPTA) 200-25 mcg/dose inhaler Take 1 Puff by inhalation daily. 1 Inhaler 0  
 fluticasone (FLOVENT HFA) 220 mcg/actuation inhaler Take 1 Puff by inhalation two (2) times a day. 4 Inhaler 3  
 albuterol (PROVENTIL VENTOLIN) 2.5 mg /3 mL (0.083 %) nebulizer solution 3 mL by Nebulization route every four (4) hours as needed for Wheezing. 1 Package 11  
 losartan-hydrochlorothiazide (HYZAAR) 100-25 mg per tablet take 1 tablet by mouth daily 30 Tab 6 Past Surgical History:  
Procedure Laterality Date  HX ORTHOPAEDIC    
 back Lab Results Component Value Date/Time WBC 7.0 10/22/2018 10:43 AM  
 HGB 11.1 10/22/2018 10:43 AM  
 HCT 32.2 (L) 10/22/2018 10:43 AM  
 PLATELET 879 40/82/6554 10:43 AM  
 MCV 85 10/22/2018 10:43 AM  
 
Lab Results Component Value Date/Time Cholesterol, total 190 10/22/2018 10:43 AM  
 HDL Cholesterol 70 10/22/2018 10:43 AM  
 LDL, calculated 106 (H) 10/22/2018 10:43 AM  
 Triglyceride 68 10/22/2018 10:43 AM  
 
Lab Results Component Value Date/Time  GFR est non-AA 54 (L) 10/22/2018 10:43 AM  
 GFR est AA 63 10/22/2018 10:43 AM  
 Creatinine 1.06 (H) 10/22/2018 10:43 AM  
 BUN 25 10/22/2018 10:43 AM  
 Sodium 144 10/22/2018 10:43 AM  
 Potassium 3.6 10/22/2018 10:43 AM  
 Chloride 101 10/22/2018 10:43 AM  
 CO2 25 10/22/2018 10:43 AM  
  
Review of Systems Respiratory: Negative for shortness of breath. Cardiovascular: Negative for chest pain. Physical Exam  
Constitutional: She is oriented to person, place, and time. She appears well-developed and well-nourished. No distress. HENT:  
Head: Normocephalic and atraumatic. Mouth/Throat: Oropharynx is clear and moist. No oropharyngeal exudate. Eyes: Conjunctivae and EOM are normal. Right eye exhibits no discharge. Left eye exhibits no discharge. Neck: Normal range of motion. Neck supple. Cardiovascular: Normal rate, regular rhythm, normal heart sounds and intact distal pulses. Exam reveals no gallop and no friction rub. No murmur heard. Pulmonary/Chest: Effort normal and breath sounds normal. No respiratory distress. She has no wheezes. She has no rales. She exhibits no tenderness. Musculoskeletal: Normal range of motion. She exhibits no edema, tenderness or deformity. Lymphadenopathy:  
  She has no cervical adenopathy. Neurological: She is alert and oriented to person, place, and time. Coordination normal.  
Skin: Skin is warm and dry. No rash noted. She is not diaphoretic. No erythema. No pallor. Psychiatric: She has a normal mood and affect. Her behavior is normal.  
 
 
ASSESSMENT and PLAN 
  ICD-10-CM ICD-9-CM 1. Essential hypertension Controlled on losartan-HCTZ 100-25 and norvasc 2.5mg daily, continue I10 401.9 2. Moderate persistent asthma without complication Stable, has singulair daily and albuterol prn 
 J45.40 493.90   
3. Mixed hyperlipidemia Diet controlled, check lipids again this fall 
 E78.2 272.2 4. Urinary frequency Was evaluated by Dr Prisca Miles, had CT abd completed today for further evaluation R35.0 788.41   
5. Overweight Counseled on diet and w/l 
 E66.3 278.02   
6. IFG (impaired fasting glucose) Check a1c 
 R73.01 790.21   
 7. Recurrent UTI - had CT this AM to f/u on renal cyst, she is on imvexxy to help with vaginal dryness, has f/u with Dr Camacho Tineo next week Scribed by Kin Van of [facility], as dictated by Dr. Apollo Jean-Baptiste. Current diagnosis and concerns discussed with pt at length. Pt understands risks and benefits or current treatment plan and medications, and accepts the treatment and medication with any possible risks. Pt asks appropriate questions, which were answered. Pt was instructed to call with any concerns or problems. I have reviewed the note documented by the scribe. The services provided are my own. The documentation is accurate

## 2019-05-07 LAB
ALBUMIN SERPL-MCNC: 4.5 G/DL (ref 3.6–4.8)
ALBUMIN/GLOB SERPL: 1.9 {RATIO} (ref 1.2–2.2)
ALP SERPL-CCNC: 81 IU/L (ref 39–117)
ALT SERPL-CCNC: 22 IU/L (ref 0–32)
AST SERPL-CCNC: 20 IU/L (ref 0–40)
BILIRUB SERPL-MCNC: <0.2 MG/DL (ref 0–1.2)
BUN SERPL-MCNC: 27 MG/DL (ref 8–27)
BUN/CREAT SERPL: 25 (ref 12–28)
CALCIUM SERPL-MCNC: 10.1 MG/DL (ref 8.7–10.3)
CHLORIDE SERPL-SCNC: 102 MMOL/L (ref 96–106)
CO2 SERPL-SCNC: 20 MMOL/L (ref 20–29)
CREAT BLD-MCNC: 1 MG/DL (ref 0.6–1.3)
CREAT SERPL-MCNC: 1.08 MG/DL (ref 0.57–1)
EST. AVERAGE GLUCOSE BLD GHB EST-MCNC: 123 MG/DL
GLOBULIN SER CALC-MCNC: 2.4 G/DL (ref 1.5–4.5)
GLUCOSE SERPL-MCNC: 162 MG/DL (ref 65–99)
HBA1C MFR BLD: 5.9 % (ref 4.8–5.6)
POTASSIUM SERPL-SCNC: 4.1 MMOL/L (ref 3.5–5.2)
PROT SERPL-MCNC: 6.9 G/DL (ref 6–8.5)
SODIUM SERPL-SCNC: 144 MMOL/L (ref 134–144)

## 2019-07-22 RX ORDER — UMECLIDINIUM BROMIDE AND VILANTEROL TRIFENATATE 62.5; 25 UG/1; UG/1
POWDER RESPIRATORY (INHALATION)
Qty: 1 INHALER | Refills: 0 | Status: SHIPPED | OUTPATIENT
Start: 2019-07-22 | End: 2019-09-16 | Stop reason: SDUPTHER

## 2019-07-22 RX ORDER — AMLODIPINE BESYLATE 2.5 MG/1
TABLET ORAL
Qty: 90 TAB | Refills: 0 | Status: SHIPPED | OUTPATIENT
Start: 2019-07-22 | End: 2019-11-18 | Stop reason: SDUPTHER

## 2019-09-16 RX ORDER — MONTELUKAST SODIUM 10 MG/1
TABLET ORAL
Qty: 90 TAB | Refills: 1 | Status: SHIPPED | OUTPATIENT
Start: 2019-09-16 | End: 2020-05-05 | Stop reason: SDUPTHER

## 2019-09-16 RX ORDER — LOSARTAN POTASSIUM AND HYDROCHLOROTHIAZIDE 25; 100 MG/1; MG/1
1 TABLET ORAL DAILY
Qty: 90 TAB | Refills: 0 | Status: SHIPPED | OUTPATIENT
Start: 2019-09-16 | End: 2020-01-16

## 2019-10-07 DIAGNOSIS — J45.40 MODERATE PERSISTENT ASTHMA WITHOUT COMPLICATION: ICD-10-CM

## 2019-10-07 RX ORDER — ALBUTEROL SULFATE 90 UG/1
AEROSOL, METERED RESPIRATORY (INHALATION)
Qty: 1 INHALER | Refills: 3 | Status: SHIPPED | OUTPATIENT
Start: 2019-10-07 | End: 2019-11-18 | Stop reason: SDUPTHER

## 2019-10-07 NOTE — TELEPHONE ENCOUNTER
PCP: Christy Edmond MD    Last appt: 5/6/2019  Future Appointments   Date Time Provider Joe Levine   11/5/2019  8:45 AM Christy Edmond MD [facility]       Requested Prescriptions     Pending Prescriptions Disp Refills    albuterol (VENTOLIN HFA) 90 mcg/actuation inhaler 1 Inhaler 3     Sig: inhale 2 puffs by mouth every 4 hours if needed for wheezing   ;l;

## 2019-11-04 ENCOUNTER — DOCUMENTATION ONLY (OUTPATIENT)
Dept: INTERNAL MEDICINE CLINIC | Age: 69
End: 2019-11-04

## 2019-11-04 NOTE — PROGRESS NOTES
Medicare Part B Preventive Services Guidelines/Limitations Date last completed and Frequency Due Date   Bone Mass Measurement  (age 72 & older, biennial) Requires diagnosis related to osteoporosis or estrogen deficiency. Biennial benefit unless patient has history of long-term glucocorticoid tx or baseline is needed because initial test was by other method  1/2019 Due 1/2020   Cardiovascular Screening Blood Tests (every 5 years)  Total cholesterol, HDL, Triglycerides Order as a panel if possible Completed 10/2018     Recommended annually Due now   Colorectal Cancer Screening  -Fecal occult blood test (annual)  -Flexible sigmoidoscopy (5y)  -Screening colonoscopy (10y)  -Barium Enema   Completed  12/22/16 with Dr Shonda Stokes     Recommended repeat in 3 years Due 12/2019   Counseling to Prevent Tobacco Use (up to 8 sessions per year)  - Counseling greater than 3 and up to 10 minutes  - Counseling greater than 10 minutes Patients must be asymptomatic of tobacco-related conditions to receive as preventive service n/a n/a   Diabetes Screening Tests (at least every 3 years, Medicare covers annually or at 6-month intervals for prediabetic patients)     Fasting blood sugar (FBS) or glucose tolerance test (GTT) Patient must be diagnosed with one of the following:  -Hypertension, Dyslipidemia, obesity, previous impaired FBS or GTT  Or any two of the following: overweight, FH of diabetes, age ? 72, history of gestational diabetes, birth of baby weighing more than 9 pounds Completed 10/2018 with A1C 5.9     Recommended every 3 years for non-diabetics, typically checked annually [facility]    Diabetes Self-Management Training (DSMT) (no USPSTF recommendation) Requires referral by treating physician for patient with diabetes or renal disease. 10 hours of initial DSMT session of no less than 30 minutes each in a continuous 12-month period.  2 hours of follow-up DSMT in subsequent years.  n/a n/a   Glaucoma Screening (no USPSTF recommendation) Diabetes mellitus, family history, , age 48 or over,  American, age 72 or over Completed 3/2018     Recommended annually Due now    Human Immunodeficiency Virus (HIV) Screening (annually for increased risk patients)  HIV-1 and HIV-2 by EIA, LOGAN, rapid antibody test, or oral mucosa transudate Patient must be at increased risk for HIV infection per USPSTF guidelines or pregnant.  Tests covered annually for patients at increased risk.  Pregnant patients may receive up to 3 test during pregnancy. n/a  N/A   Medical Nutrition Therapy (MNT) (for diabetes or renal disease not recommended schedule) Requires referral by treating physician for patient with diabetes or renal disease.  Can be provided in same year as diabetes self-management training (DSMT), and CMS recommends medical nutrition therapy take place after DSMT. Rama Huffmanoa to 3 hours for initial year and 2 hours in subsequent years. n/a  N/A             Seasonal Influenza Vaccination (annually)   Completed 10/2018     Recommended Annually Due annually    Pneumococcal Vaccination (once after 72)   Pneumococcal 23 -10/23/17  Recommended once over the age of 72     Prevnar 15 -10/18/16Recommended once over the age of 72 Complete              Complete   Hepatitis B Vaccinations (if medium/high risk) Medium/high risk factors:  End-stage renal disease,  Hemophiliacs who received Factor VIII or IX concentrates, Clients of institutions for the mentally retarded, Persons who live in the same house as a HepB virus carrier, Homosexual men, Illicit injectable drug abusers. n/a n/a   Screening Mammography (biennial age 54-69)?  Annually (age 36 or over) Completed 8/2017     Recommended annually Due now   Screening Pap Tests and Pelvic Examination (up to age 79 and after 79 if unknown history or abnormal study last 10 years) Every 24 months except high risk Completed 8/2017     Recommended every 2 years Due now    Ultrasound Screening for Abdominal Aortic Aneurysm (AAA) (once) Patient must be referred through IPPE and not have had a screening for abdominal aortic aneurysm before under Medicare.  Limited to patients who meet one of the following criteria:  - Men who are 73-68 years old and have smoked more than 100 cigarettes in their lifetime.  -Anyone with a FH of AAA  -Anyone recommended for screening by USPSTF Completed 5/6/19 with CT abd- negative  Completed

## 2019-11-04 NOTE — PROGRESS NOTES
HISTORY OF PRESENT ILLNESS  Elza Serna is a 77 y.o. female. HPI   Last here 5/6/19. Pt is here to f/u on chronic conditions.      BP today is 114/71   BP 120s/80s   Continues on losartan-HCTZ 100-25mg and norvasc 2.5mg daily   Pt took her meds this AM  Denies any orthostasis     Wt today is 158 lbs-- stable x lov   Discussed continued diet and w/l      Reviewed labs 5/19      Previously provided information for Dr Aleksandra Manning (derm)  Pt now follows with Dr Maegan Infante (derm)   Last visit was 4/19, reviewed notes: SKs, cysts, benign   Pt had a lipoma removed from arm      Pt saw Dr Mitch Sawant (uro gyn) for recurrent UTI sx  Reviewed notes 3/19: CT scan, f/u as needed, continue on imvexxy   Reviewed CT scan 5/6/19:1. Multiple small hypodensities within the kidneys all measuring less than 1 cm,  too small to characterize  2. Nonobstructing bilateral renal stones without hydronephrosis  3. 2 cm cyst off the spleen.   Pt has not had any UTIs since then   No longer taking imvexxy   Doing well      Continues singulair daily  Also uses allegra  No longer using flovent  Failed breo   Uses anoro which helps  She also has albuterol to use prn for breathing    Lov, pt c/o a strangling cough  Pt c/o this again but more of a hacking cough now   States this keeps her up at night   States the only thing that helps is taking cough syrup, had to take some last night     She has been using anoro which has helped keep it from progressing to strangling cough   Cough is better but still present  Breo and singulair did not help with this  Lov, pt stated she would see a pulmonologist however she has not yet been   Discussed this could be allergies or asthmatic   Discussed again to see pulm or ENT   Discussed getting cough syrup without the D as it will raise her Bps   Recall advair caused a rash      Recall  ECHO 2/19: EF 56-60%, mild dilation of L atrial cavity    Pt lives with her  this is a safe environment     Functionally independent       No memory concerns        ACP not on file.  SDM is her . Pt has this information at home and will bring it in.      PREVENTIVE:    Colonoscopy: 12/22/16, Dr. Alayna Roman, repeat 3 years, due 12/19, reminded pt to schedule   Pap: Dr. Rosa Vicente, 8/31/17, every other year  Mammogram: Dr. Rosa Vicente, 1/19, will get report for review  DEXA: 1/28/19, osteopenia  AAA: no FMHX  Tdap: 5/13/2013  Pneumovax: 10/23/17  Odvutds00: 10/18/2016  Zostavax: 9/25/2015  Shingrix: ordered 10/22/18, reminded  Flu shot: Fall 2019   A1c: 4/16 6.1, 10/16 6.2, 10/17 5.9, 4/18 5.6, 10/18 5.9 5/19 5.9   Eye exam: Dr. Dunlap Cough, due  Lipids: 10/18 LDL 106  EKG: 10/23/17, nsr  AAA: 5/19, negative with CT abd     Patient Active Problem List    Diagnosis Date Noted    Cyst of tendon sheath 04/09/2012    Depression, reactive 06/27/2011    HTN (hypertension) 12/14/2009    Asthma 12/14/2009    Allergic rhinitis 12/14/2009     Current Outpatient Medications   Medication Sig Dispense Refill    albuterol (VENTOLIN HFA) 90 mcg/actuation inhaler inhale 2 puffs by mouth every 4 hours if needed for wheezing 1 Inhaler 3    losartan-hydroCHLOROthiazide (HYZAAR) 100-25 mg per tablet Take 1 Tab by mouth daily. 90 Tab 0    montelukast (SINGULAIR) 10 mg tablet Daily 90 Tab 1    umeclidinium-vilanterol (ANORO ELLIPTA) 62.5-25 mcg/actuation inhaler Take 1 Puff by inhalation daily. 1 Inhaler 0    amLODIPine (NORVASC) 2.5 mg tablet take 1 tablet by mouth once daily 90 Tab 0    fluticasone propionate (FLONASE NA) by Nasal route.  estradiol (ESTRACE) 0.01 % (0.1 mg/gram) vaginal cream Insert 2 g into vagina daily. 2 times per week 42.5 g 0    conjugated estrogens (PREMARIN) 0.625 mg/gram vaginal cream Insert 0.5 g into vagina two (2) times a week. 2 times per week 30 g 1    budesonide-formoterol (SYMBICORT) 160-4.5 mcg/actuation HFAA Take 2 Puffs by inhalation two (2) times a day.  1 Inhaler 0    fluticasone-vilanterol (BREO ELLIPTA) 200-25 mcg/dose inhaler Take 1 Puff by inhalation daily. 1 Inhaler 0    fluticasone (FLOVENT HFA) 220 mcg/actuation inhaler Take 1 Puff by inhalation two (2) times a day. 4 Inhaler 3    albuterol (PROVENTIL VENTOLIN) 2.5 mg /3 mL (0.083 %) nebulizer solution 3 mL by Nebulization route every four (4) hours as needed for Wheezing. 1 Package 11    losartan-hydrochlorothiazide (HYZAAR) 100-25 mg per tablet take 1 tablet by mouth daily 30 Tab 6     Past Surgical History:   Procedure Laterality Date    HX ORTHOPAEDIC      back      Lab Results   Component Value Date/Time    WBC 7.0 10/22/2018 10:43 AM    HGB 11.1 10/22/2018 10:43 AM    HCT 32.2 (L) 10/22/2018 10:43 AM    PLATELET 452 57/37/8590 10:43 AM    MCV 85 10/22/2018 10:43 AM     Lab Results   Component Value Date/Time    Cholesterol, total 190 10/22/2018 10:43 AM    HDL Cholesterol 70 10/22/2018 10:43 AM    LDL, calculated 106 (H) 10/22/2018 10:43 AM    Triglyceride 68 10/22/2018 10:43 AM     Lab Results   Component Value Date/Time    GFR est non-AA 53 (L) 05/06/2019 03:12 PM    GFRNA, POC 55 (L) 05/06/2019 01:14 PM    GFR est AA 61 05/06/2019 03:12 PM    GFRAA, POC >60 05/06/2019 01:14 PM    Creatinine 1.08 (H) 05/06/2019 03:12 PM    Creatinine (POC) 1.0 05/06/2019 01:14 PM    BUN 27 05/06/2019 03:12 PM    Sodium 144 05/06/2019 03:12 PM    Potassium 4.1 05/06/2019 03:12 PM    Chloride 102 05/06/2019 03:12 PM    CO2 20 05/06/2019 03:12 PM        Review of Systems   Respiratory: Negative for shortness of breath. Cardiovascular: Negative for chest pain. Physical Exam   Constitutional: She is oriented to person, place, and time. She appears well-developed and well-nourished. No distress. HENT:   Head: Normocephalic and atraumatic. Right Ear: External ear normal.   Left Ear: External ear normal.   Mouth/Throat: Oropharynx is clear and moist. No oropharyngeal exudate. Eyes: Pupils are equal, round, and reactive to light.  Conjunctivae and EOM are normal. Right eye exhibits no discharge. Left eye exhibits no discharge. No scleral icterus. Neck: Normal range of motion. Neck supple. No carotid bruits    Cardiovascular: Normal rate, regular rhythm, normal heart sounds and intact distal pulses. Exam reveals no gallop and no friction rub. No murmur heard. Pulmonary/Chest: Effort normal and breath sounds normal. No respiratory distress. She has no wheezes. She has no rales. She exhibits no tenderness. Abdominal: Soft. She exhibits no distension and no mass. There is no tenderness. There is no rebound and no guarding. Musculoskeletal: Normal range of motion. She exhibits no edema, tenderness or deformity. Lymphadenopathy:     She has no cervical adenopathy. Neurological: She is alert and oriented to person, place, and time. Coordination normal.   Skin: Skin is warm and dry. No rash noted. She is not diaphoretic. No erythema. No pallor. Psychiatric: She has a normal mood and affect. Her behavior is normal.       ASSESSMENT and PLAN    ICD-10-CM ICD-9-CM    1. Essential hypertension                  Controlled on losartan hctz and norvasc continue  I10 401.9 LIPID PANEL      METABOLIC PANEL, COMPREHENSIVE      CBC W/O DIFF      TSH 3RD GENERATION      HEMOGLOBIN A1C WITH EAG   2. Moderate persistent asthma without complication                Pt is currently using anoro inhaler prev tried and failed flovent and symbicort primarily has chronic cough that is improved on anoro but not resolved also on singulair has not seen pulm yet reprinted info  J45.40 493.90 LIPID PANEL      METABOLIC PANEL, COMPREHENSIVE      CBC W/O DIFF      TSH 3RD GENERATION      HEMOGLOBIN A1C WITH EAG   3. Depression, reactive                      Controlled w/o meds not an active issue  F32.9 300.4 LIPID PANEL      METABOLIC PANEL, COMPREHENSIVE      CBC W/O DIFF      TSH 3RD GENERATION      HEMOGLOBIN A1C WITH EAG   4.  IFG (impaired fasting glucose)                  Check a1c today work on diet  R73.01 790.21 LIPID PANEL      METABOLIC PANEL, COMPREHENSIVE      CBC W/O DIFF      TSH 3RD GENERATION      HEMOGLOBIN A1C WITH EAG   5. Urinary frequency                Follows with dr Lynnette Abel was on imvexxy which helped with recurrent utis currently doing well has vaginal estrogen that she uses periodically  R35.0 788.41 LIPID PANEL      METABOLIC PANEL, COMPREHENSIVE      CBC W/O DIFF      TSH 3RD GENERATION      HEMOGLOBIN A1C WITH EAG   6. Medicare annual wellness visit, subsequent Z00.00 V70.0 LIPID PANEL      METABOLIC PANEL, COMPREHENSIVE      CBC W/O DIFF      TSH 3RD GENERATION      HEMOGLOBIN A1C WITH EAG   7. Overweight              Counseled on portion control and wt loss  E66.3 278.02 LIPID PANEL      METABOLIC PANEL, COMPREHENSIVE      CBC W/O DIFF      TSH 3RD GENERATION      HEMOGLOBIN A1C WITH EAG   8. Colon cancer screening Z12.11 V76.51 REFERRAL TO GASTROENTEROLOGY      LIPID PANEL      METABOLIC PANEL, COMPREHENSIVE      CBC W/O DIFF      TSH 3RD GENERATION      HEMOGLOBIN A1C WITH EAG   9. Cough            See above  R05 786.2                   Depression screen reviewed and negative. Scribed by Jerome Avalos of [address] [place] [address] [address], as dictated by Dr. Alecia Lawson. Current diagnosis and concerns discussed with pt at length. Pt understands risks and benefits or current treatment plan and medications, and accepts the treatment and medication with any possible risks. Pt asks appropriate questions, which were answered. Pt was instructed to call with any concerns or problems. I have reviewed the note documented by the scribe. The services provided are my own.   The documentation is accurate

## 2019-11-05 ENCOUNTER — HOSPITAL ENCOUNTER (OUTPATIENT)
Dept: LAB | Age: 69
Discharge: HOME | End: 2019-11-05
Payer: MEDICARE

## 2019-11-05 ENCOUNTER — OFFICE VISIT (OUTPATIENT)
Dept: INTERNAL MEDICINE CLINIC | Age: 69
End: 2019-11-05

## 2019-11-05 VITALS
HEIGHT: 63 IN | BODY MASS INDEX: 28 KG/M2 | HEART RATE: 61 BPM | RESPIRATION RATE: 16 BRPM | DIASTOLIC BLOOD PRESSURE: 71 MMHG | TEMPERATURE: 97.8 F | OXYGEN SATURATION: 97 % | WEIGHT: 158 LBS | SYSTOLIC BLOOD PRESSURE: 114 MMHG

## 2019-11-05 DIAGNOSIS — Z12.11 COLON CANCER SCREENING: ICD-10-CM

## 2019-11-05 DIAGNOSIS — J45.40 MODERATE PERSISTENT ASTHMA WITHOUT COMPLICATION: ICD-10-CM

## 2019-11-05 DIAGNOSIS — F32.9 DEPRESSION, REACTIVE: ICD-10-CM

## 2019-11-05 DIAGNOSIS — Z00.00 MEDICARE ANNUAL WELLNESS VISIT, SUBSEQUENT: ICD-10-CM

## 2019-11-05 DIAGNOSIS — R05.9 COUGH: ICD-10-CM

## 2019-11-05 DIAGNOSIS — E66.3 OVERWEIGHT: ICD-10-CM

## 2019-11-05 DIAGNOSIS — R73.01 IFG (IMPAIRED FASTING GLUCOSE): ICD-10-CM

## 2019-11-05 DIAGNOSIS — I10 ESSENTIAL HYPERTENSION: Primary | ICD-10-CM

## 2019-11-05 DIAGNOSIS — R35.0 URINARY FREQUENCY: ICD-10-CM

## 2019-11-05 PROCEDURE — 83036 HEMOGLOBIN GLYCOSYLATED A1C: CPT

## 2019-11-05 PROCEDURE — 36415 COLL VENOUS BLD VENIPUNCTURE: CPT

## 2019-11-05 PROCEDURE — 80053 COMPREHEN METABOLIC PANEL: CPT

## 2019-11-05 PROCEDURE — 85027 COMPLETE CBC AUTOMATED: CPT

## 2019-11-05 PROCEDURE — 84443 ASSAY THYROID STIM HORMONE: CPT

## 2019-11-05 PROCEDURE — 80061 LIPID PANEL: CPT

## 2019-11-05 NOTE — PATIENT INSTRUCTIONS
Medicare Part B Preventive Services Guidelines/Limitations Date last completed and Frequency Due Date Bone Mass Measurement 
(age 72 & older, biennial) Requires diagnosis related to osteoporosis or estrogen deficiency. Biennial benefit unless patient has history of long-term glucocorticoid tx or baseline is needed because initial test was by other method  1/2019 Due 1/2020 Cardiovascular Screening Blood Tests (every 5 years) Total cholesterol, HDL, Triglycerides Order as a panel if possible Completed 10/2018 
  
Recommended annually Due now Colorectal Cancer Screening 
-Fecal occult blood test (annual) -Flexible sigmoidoscopy (5y) 
-Screening colonoscopy (10y) -Barium Enema   Completed  12/22/16 with Dr Rio Segura 
  
Recommended repeat in 3 years Due 12/2019 Counseling to Prevent Tobacco Use (up to 8 sessions per year) - Counseling greater than 3 and up to 10 minutes - Counseling greater than 10 minutes Patients must be asymptomatic of tobacco-related conditions to receive as preventive service n/a n/a Diabetes Screening Tests (at least every 3 years, Medicare covers annually or at 6-month intervals for prediabetic patients) 
  Fasting blood sugar (FBS) or glucose tolerance test (GTT) Patient must be diagnosed with one of the following: 
-Hypertension, Dyslipidemia, obesity, previous impaired FBS or GTT 
Or any two of the following: overweight, FH of diabetes, age ? 72, history of gestational diabetes, birth of baby weighing more than 9 pounds Completed 10/2018 with A1C 5.9 
  
Recommended every 3 years for non-diabetics, typically checked annually Due now Diabetes Self-Management Training (DSMT) (no USPSTF recommendation) Requires referral by treating physician for patient with diabetes or renal disease. 10 hours of initial DSMT session of no less than 30 minutes each in a continuous 12-month period.  2 hours of follow-up DSMT in subsequent years.  n/a n/a  
 Glaucoma Screening (no USPSTF recommendation) Diabetes mellitus, family history, , age 48 or over,  American, age 72 or over Completed 3/2018 
  
Recommended annually Due now Human Immunodeficiency Virus (HIV) Screening (annually for increased risk patients) HIV-1 and HIV-2 by EIA, LOGAN, rapid antibody test, or oral mucosa transudate Patient must be at increased risk for HIV infection per USPSTF guidelines or pregnant.  Tests covered annually for patients at increased risk.  Pregnant patients may receive up to 3 test during pregnancy. n/a  N/A Medical Nutrition Therapy (MNT) (for diabetes or renal disease not recommended schedule) Requires referral by treating physician for patient with diabetes or renal disease.  Can be provided in same year as diabetes self-management training (DSMT), and CMS recommends medical nutrition therapy take place after DSMT.  Up to 3 hours for initial year and 2 hours in subsequent years. n/a  N/A  
         
Seasonal Influenza Vaccination (annually)   Completed fall 2019  
  
Recommended Annually Due annually Pneumococcal Vaccination (once after 65)   Pneumococcal 23 -10/23/17 Recommended once over the age of 72 
  
Prevnar 15 -10/18/16Recommended once over the age of 72 Complete 
  
  
  
  
Complete Hepatitis B Vaccinations (if medium/high risk) Medium/high risk factors:  End-stage renal disease, Hemophiliacs who received Factor VIII or IX concentrates, Clients of institutions for the mentally retarded, Persons who live in the same house as a HepB virus carrier, Homosexual men, Illicit injectable drug abusers. n/a n/a Screening Mammography (biennial age 54-69)? Annually (age 36 or over) Completed 1/19 
  
Recommended annually Annually Screening Pap Tests and Pelvic Examination (up to age 79 and after 79 if unknown history or abnormal study last 10 years) Every 24 months except high risk Completed 8/2017 
  
 Recommended every 2 years Due now Ultrasound Screening for Abdominal Aortic Aneurysm (AAA) (once) Patient must be referred through IPPE and not have had a screening for abdominal aortic aneurysm before under Medicare.  Limited to patients who meet one of the following criteria: 
- Men who are 73-68 years old and have smoked more than 100 cigarettes in their lifetime. 
-Anyone with a FH of AAA 
-Anyone recommended for screening by USPSTF Completed 5/6/19 with CT abd- negative  Completed   
  
  
 
Medicare Wellness Visit, Female The best way to live healthy is to have a lifestyle where you eat a well-balanced diet, exercise regularly, limit alcohol use, and quit all forms of tobacco/nicotine, if applicable. Regular preventive services are another way to keep healthy. Preventive services (vaccines, screening tests, monitoring & exams) can help personalize your care plan, which helps you manage your own care. Screening tests can find health problems at the earliest stages, when they are easiest to treat. [facility] follows the current, evidence-based guidelines published by the [facility] ([facility]) when recommending preventive services for our patients. Because we follow these guidelines, sometimes recommendations change over time as research supports it. (For example, mammograms used to be recommended annually. Even though Medicare will still pay for an annual mammogram, the newer guidelines recommend a mammogram every two years for women of average risk). Of course, you and your doctor may decide to screen more often for some diseases, based on your risk and your co-morbidities (chronic disease you are already diagnosed with). Preventive services for you include: - Medicare offers their members a free annual wellness visit, which is time for you and your primary care provider to discuss and plan for your preventive service needs. Take advantage of this benefit every year! 
-All adults over the age of 72 should receive the recommended pneumonia vaccines. Current USPSTF guidelines recommend a series of two vaccines for the best pneumonia protection.  
-All adults should have a flu vaccine yearly and a tetanus vaccine every 10 years.  
-All adults age 48 and older should receive the shingles vaccines (series of two vaccines). -All adults age 38-68 who are overweight should have a diabetes screening test once every three years.  
-All adults born between 80 and 1965 should be screened once for Hepatitis C. 
-Other screening tests and preventive services for persons with diabetes include: an eye exam to screen for diabetic retinopathy, a kidney function test, a foot exam, and stricter control over your cholesterol.  
-Cardiovascular screening for adults with routine risk involves an electrocardiogram (ECG) at intervals determined by your doctor.  
-Colorectal cancer screenings should be done for adults age 54-65 with no increased risk factors for colorectal cancer. There are a number of acceptable methods of screening for this type of cancer. Each test has its own benefits and drawbacks. Discuss with your doctor what is most appropriate for you during your annual wellness visit. The different tests include: colonoscopy (considered the best screening method), a fecal occult blood test, a fecal DNA test, and sigmoidoscopy. 
 
-A bone mass density test is recommended when a woman turns 65 to screen for osteoporosis. This test is only recommended one time, as a screening. Some providers will use this same test as a disease monitoring tool if you already have osteoporosis. -Breast cancer screenings are recommended every other year for women of normal risk, age 54-69. 
-Cervical cancer screenings for women over age 72 are only recommended with certain risk factors. Here is a list of your current Health Maintenance items (your personalized list of preventive services) with a due date: 
Health Maintenance Due Topic Date Due  Shingles Vaccine (1 of 2) 09/13/2001  Flu Vaccine  08/01/2019  Mammogram  09/18/2019 Felix Verma Annual Well Visit  10/23/2019 Please bring in a copy of your advanced directive to your next office visit so we can have a copy on file.

## 2019-11-05 NOTE — PROGRESS NOTES
This is the Subsequent Medicare Annual Wellness Exam, performed 12 months or more after the Initial AWV or the last Subsequent AWV    I have reviewed the patient's medical history in detail and updated the computerized patient record. History     Patient Active Problem List   Diagnosis Code    HTN (hypertension) I10    Asthma J45.909    Allergic rhinitis J30.9    Depression, reactive F32.9    Cyst of tendon sheath M67.80     Past Medical History:   Diagnosis Date    Ankle sprain 03/2014    right    Asthma 12/14/2009    HTN (hypertension) 12/14/2009    Iron deficiency anemia 12/14/2009      Past Surgical History:   Procedure Laterality Date    HX ORTHOPAEDIC      back     Current Outpatient Medications   Medication Sig Dispense Refill    varicella-zoster recombinant, PF, (SHINGRIX, PF,) 50 mcg/0.5 mL susr injection 0.5mL by IntraMUSCular route once now and then repeat in 2-6 months 0.5 mL 1    albuterol (VENTOLIN HFA) 90 mcg/actuation inhaler inhale 2 puffs by mouth every 4 hours if needed for wheezing 1 Inhaler 3    losartan-hydroCHLOROthiazide (HYZAAR) 100-25 mg per tablet Take 1 Tab by mouth daily. 90 Tab 0    montelukast (SINGULAIR) 10 mg tablet Daily 90 Tab 1    umeclidinium-vilanterol (ANORO ELLIPTA) 62.5-25 mcg/actuation inhaler Take 1 Puff by inhalation daily. 1 Inhaler 0    amLODIPine (NORVASC) 2.5 mg tablet take 1 tablet by mouth once daily 90 Tab 0    fluticasone propionate (FLONASE NA) by Nasal route.  estradiol (ESTRACE) 0.01 % (0.1 mg/gram) vaginal cream Insert 2 g into vagina daily. 2 times per week 42.5 g 0    conjugated estrogens (PREMARIN) 0.625 mg/gram vaginal cream Insert 0.5 g into vagina two (2) times a week. 2 times per week 30 g 1    budesonide-formoterol (SYMBICORT) 160-4.5 mcg/actuation HFAA Take 2 Puffs by inhalation two (2) times a day. 1 Inhaler 0    fluticasone-vilanterol (BREO ELLIPTA) 200-25 mcg/dose inhaler Take 1 Puff by inhalation daily.  1 Inhaler 0  fluticasone (FLOVENT HFA) 220 mcg/actuation inhaler Take 1 Puff by inhalation two (2) times a day. 4 Inhaler 3    albuterol (PROVENTIL VENTOLIN) 2.5 mg /3 mL (0.083 %) nebulizer solution 3 mL by Nebulization route every four (4) hours as needed for Wheezing. 1 Package 11    losartan-hydrochlorothiazide (HYZAAR) 100-25 mg per tablet take 1 tablet by mouth daily 30 Tab 6     Allergies   Allergen Reactions    Acetylcarnitine Swelling    Ace Inhibitors Cough    Aleve [Naproxen Sodium] Hives    Daypro [Oxaprozin] Rash    Effexor [Venlafaxine] Other (comments)     Abdominal pain    Prednisone Swelling     Face swelling ONLY with large dosage    Shellfish Containing Products Swelling     Throat and tounge       No family history on file. Social History     Tobacco Use    Smoking status: Never Smoker    Smokeless tobacco: Never Used   Substance Use Topics    Alcohol use: Yes     Alcohol/week: 0.8 standard drinks     Types: 1 Glasses of wine per week     Comment: occasionally       Depression Risk Factor Screening:     3 most recent PHQ Screens 11/5/2019   Little interest or pleasure in doing things Not at all   Feeling down, depressed, irritable, or hopeless Not at all   Total Score PHQ 2 0       Alcohol Risk Factor Screening:   Do you average 1 drink per night or more than 7 drinks a week:  No    On any one occasion in the past three months have you have had more than 3 drinks containing alcohol:  No  One glass per week     Functional Ability and Level of Safety:   Hearing: Hearing is good. Activities of Daily Living: The home contains: no safety equipment. Patient does total self care    Ambulation: with no difficulty    Fall Risk:  Fall Risk Assessment, last 12 mths 11/5/2019   Able to walk? Yes   Fall in past 12 months?  No       Abuse Screen:  Patient is not abused  Lives with    Cognitive Screening   Has your family/caregiver stated any concerns about your memory: no  Cognitive Screening: Normal - not indicated    Patient Care Team   Patient Care Team:  Lenin Barnett MD as PCP - General (Internal Medicine)  Lenin Barnett MD as PCP - [facility] Empaneled Provider  Sara Harrison MD (Ophthalmology)  Amy Doherty MD (Gastroenterology)  Elian Solitario MD (Obstetrics & Gynecology)  Dario Renner MD (Orthopedic Surgery)  Don Rahman MD (Dermatology)  Brain Christensen MD (Female Pelvic Medicine and Reconstructive Surgery)   updated    Assessment/Plan   Education and counseling provided:  Are appropriate based on today's review and evaluation  End-of-Life planning (with patient's consent)  Influenza Vaccine  Screening Mammography  Screening Pap and pelvic (covered once every 2 years)  Colorectal cancer screening tests  Cardiovascular screening blood test  Bone mass measurement (DEXA)  Screening for glaucoma  Diabetes screening test    Diagnoses and all orders for this visit:    1. Essential hypertension    2. Moderate persistent asthma without complication    3. Depression, reactive    4. IFG (impaired fasting glucose)    5. Urinary frequency    6. Medicare annual wellness visit, subsequent    Other orders  -     varicella-zoster recombinant, PF, (SHINGRIX, PF,) 50 mcg/0.5 mL susr injection; 0.5mL by IntraMUSCular route once now and then repeat in 2-6 months        Health Maintenance Due   Topic Date Due    Shingrix Vaccine Age 49> (1 of 2) 09/13/2001    Influenza Age 5 to Adult  08/01/2019    BREAST CANCER SCRN MAMMOGRAM  09/18/2019    MEDICARE YEARLY EXAM  10/23/2019     Discussed with patient about advance medical directive. Provided patient blank AMD and Your Right to Decide Booklet. Requested that if completed to provide a copy of AMD to office. ACP not on file.  SDM is her .   Pt has this information at home and will bring it in.        Colonoscopy: 12/22/16, Dr. Izaiah Fitzgerald, repeat 3 years, due 12/19, reminded pt to schedule   Pap: Dr. Placido Cornejo, 8/31/17, every other year  Mammogram: Dr. Deepak Olivera, 1/19, will get report for review  DEXA: 1/28/19, osteopenia  AAA: no FMHX    Tdap: 5/13/2013  Pneumovax: 10/23/17  [id number]: 10/18/2016  Zostavax: 9/25/2015  Shingrix: ordered 10/22/18, reminded  Flu shot: Fall 2019     Eye exam: Dr. Flavio Ayers, due    A1c:5/19 5.9  due  Lipids: 10/18 LDL 106 due    EKG: 10/23/17, nsr  AAA: 5/19, negative with CT abd     Medication reconciliation completed by MA and reviewed by me. Medical/surgical/social/family history reviewed and updated by me. Patient provided AVS and preventative screening table. Patient verbalized understanding of all information discussed.

## 2019-11-06 LAB
ALBUMIN SERPL-MCNC: 4.2 G/DL (ref 3.6–4.8)
ALBUMIN/GLOB SERPL: 1.8 {RATIO} (ref 1.2–2.2)
ALP SERPL-CCNC: 75 IU/L (ref 39–117)
ALT SERPL-CCNC: 16 IU/L (ref 0–32)
AST SERPL-CCNC: 19 IU/L (ref 0–40)
BILIRUB SERPL-MCNC: 0.2 MG/DL (ref 0–1.2)
BUN SERPL-MCNC: 20 MG/DL (ref 8–27)
BUN/CREAT SERPL: 18 (ref 12–28)
CALCIUM SERPL-MCNC: 9.9 MG/DL (ref 8.7–10.3)
CHLORIDE SERPL-SCNC: 103 MMOL/L (ref 96–106)
CHOLEST SERPL-MCNC: 200 MG/DL (ref 100–199)
CO2 SERPL-SCNC: 26 MMOL/L (ref 20–29)
CREAT SERPL-MCNC: 1.14 MG/DL (ref 0.57–1)
ERYTHROCYTE [DISTWIDTH] IN BLOOD BY AUTOMATED COUNT: 12.7 % (ref 12.3–15.4)
EST. AVERAGE GLUCOSE BLD GHB EST-MCNC: 120 MG/DL
GLOBULIN SER CALC-MCNC: 2.4 G/DL (ref 1.5–4.5)
GLUCOSE SERPL-MCNC: 90 MG/DL (ref 65–99)
HBA1C MFR BLD: 5.8 % (ref 4.8–5.6)
HCT VFR BLD AUTO: 31.7 % (ref 34–46.6)
HDLC SERPL-MCNC: 58 MG/DL
HGB BLD-MCNC: 10.8 G/DL (ref 11.1–15.9)
LDLC SERPL CALC-MCNC: 124 MG/DL (ref 0–99)
MCH RBC QN AUTO: 30.1 PG (ref 26.6–33)
MCHC RBC AUTO-ENTMCNC: 34.1 G/DL (ref 31.5–35.7)
MCV RBC AUTO: 88 FL (ref 79–97)
PLATELET # BLD AUTO: 331 X10E3/UL (ref 150–450)
POTASSIUM SERPL-SCNC: 3.8 MMOL/L (ref 3.5–5.2)
PROT SERPL-MCNC: 6.6 G/DL (ref 6–8.5)
RBC # BLD AUTO: 3.59 X10E6/UL (ref 3.77–5.28)
SODIUM SERPL-SCNC: 144 MMOL/L (ref 134–144)
TRIGL SERPL-MCNC: 89 MG/DL (ref 0–149)
TSH SERPL DL<=0.005 MIU/L-ACNC: 1.11 UIU/ML (ref 0.45–4.5)
VLDLC SERPL CALC-MCNC: 18 MG/DL (ref 5–40)
WBC # BLD AUTO: 6.8 X10E3/UL (ref 3.4–10.8)

## 2019-11-07 NOTE — PROGRESS NOTES
Mild anemia on labs  Due for colo next month  Have her schedule, add ferritin and iron to blood at lab

## 2019-11-11 NOTE — PROGRESS NOTES
Called, spoke to pt. Two pt identifiers confirmed. Pt informed per Dr. López Saliva mild anemia on labs. Pt states that she is going to call to schedule [place] for next month. Pt verbalized understanding of information discussed w/ no further questions at this time.

## 2019-11-18 DIAGNOSIS — J45.40 MODERATE PERSISTENT ASTHMA WITHOUT COMPLICATION: ICD-10-CM

## 2019-11-18 RX ORDER — ALBUTEROL SULFATE 90 UG/1
AEROSOL, METERED RESPIRATORY (INHALATION)
Qty: 1 INHALER | Refills: 3 | Status: SHIPPED | OUTPATIENT
Start: 2019-11-18 | End: 2020-03-02 | Stop reason: SDUPTHER

## 2019-11-18 RX ORDER — AMLODIPINE BESYLATE 2.5 MG/1
TABLET ORAL
Qty: 90 TAB | Refills: 0 | Status: SHIPPED | OUTPATIENT
Start: 2019-11-18 | End: 2020-03-02 | Stop reason: SDUPTHER

## 2019-11-22 ENCOUNTER — HOSPITAL ENCOUNTER (OUTPATIENT)
Dept: GENERAL RADIOLOGY | Age: 69
Discharge: HOME | End: 2019-11-22
Payer: MEDICARE

## 2019-11-22 DIAGNOSIS — R06.02 SOB (SHORTNESS OF BREATH): ICD-10-CM

## 2019-11-22 PROCEDURE — 71046 X-RAY EXAM CHEST 2 VIEWS: CPT

## 2020-01-16 RX ORDER — LOSARTAN POTASSIUM AND HYDROCHLOROTHIAZIDE 25; 100 MG/1; MG/1
TABLET ORAL
Qty: 90 TAB | Refills: 0 | Status: SHIPPED | OUTPATIENT
Start: 2020-01-16 | End: 2020-05-05 | Stop reason: SDUPTHER

## 2020-03-02 DIAGNOSIS — J45.40 MODERATE PERSISTENT ASTHMA WITHOUT COMPLICATION: ICD-10-CM

## 2020-03-02 RX ORDER — ALBUTEROL SULFATE 90 UG/1
AEROSOL, METERED RESPIRATORY (INHALATION)
Qty: 1 INHALER | Refills: 3 | Status: SHIPPED | OUTPATIENT
Start: 2020-03-02 | End: 2020-10-18

## 2020-03-02 RX ORDER — AMLODIPINE BESYLATE 2.5 MG/1
TABLET ORAL
Qty: 90 TAB | Refills: 0 | Status: SHIPPED | OUTPATIENT
Start: 2020-03-02 | End: 2020-05-05 | Stop reason: SDUPTHER

## 2020-05-04 NOTE — PROGRESS NOTES
HISTORY OF PRESENT ILLNESS  Melchor Zaldivar is a 77 y.o. female. HPI   Last here 11/5/19. Pt is here to f/u on chronic conditions. This is an established visit completed with telemedicine was completed with video assist  the patient acknowledges and agrees to this method of visitation  doxyme          /78,  Runs 120's/70's  Continues on losartan-HCTZ 100-25mg and norvasc 2.5mg daily   Pt took her meds this AM  Denies any orthostasis     Wt was 158 lbs-- was 155lb at home today   Discussed continued diet and w/l      Reviewed labs         Pt now follows with Dr Shantel Elliott (derm)   Last visit was 4/19, reviewed notes: SKs, cysts, benign        Pt saw Dr Katy Horne ([facility]) for recurrent UTI sx  Pt has not had any UTIs since then   No longer taking imvexxy   Doing well      Continues singulair daily  Also uses allegra  No longer using flovent  Failed breo   Uses anoro which helps  She also has albuterol to use prn for breathing     Lov, pt c/o a strangling cough  Pt c/o this again but more of a hacking cough now   States this keeps her up at night   She ended up seeing dr Titi Tena in 11/19 --nl cxr  Later saw him in march as well  She was given flovent by dr Titi Tena  States asthmatic cough and she feels that this works pretty well. Felt allergies as well  Continues singulair  reviewed notes from 1/20---discussed nl spirometry , chornic cough  Also given mucinex at night which helps, tesselon given does not use much   Reviewed 3/20--continued same meds          Recall advair caused a rash      Recall  ECHO 2/19: EF 56-60%, mild dilation of L atrial cavity           ACP not on file.  SDM is her .   Pt has this information at home and will bring it in.      PREVENTIVE:    Colonoscopy: 12/22/16, Dr. Soledad Delcid, repeat 3 years, --now scheduled 6/24/20  Pap: Dr. Philipp Matias, 1/19 every other year  Mammogram: Dr. Mitzy Muñoz, due  DEXA: 1/28/19, osteopenia  AAA: no FMHX  Tdap: 5/13/2013  Pneumovax: 10/23/17  [id number]: 10/18/2016  Zostavax: 9/25/2015  Shingrix: completed both in 2019  Flu shot: Fall 2019   A1c: 4/16 6.1, 10/16 6.2, 10/17 5.9, 4/18 5.6, 10/18 5.9 5/19 5.9 11/19 5.8  Eye exam: Dr. Sandrine Leon, due  Lipids: 11/19 ldl 124  EKG: 10/23/17, nsr  AAA: 5/19, negative with CT abd     Patient Active Problem List   Diagnosis Code    HTN (hypertension) I10    Asthma J45.909    Allergic rhinitis J30.9    Depression, reactive F32.9    Cyst of tendon sheath M67.80     Current Outpatient Medications   Medication Sig Dispense Refill    amLODIPine (NORVASC) 2.5 mg tablet take 1 tablet by mouth once daily 90 Tab 0    albuterol (VENTOLIN HFA) 90 mcg/actuation inhaler inhale 2 puffs by mouth every 4 hours if needed for wheezing 1 Inhaler 3    losartan-hydroCHLOROthiazide (HYZAAR) 100-25 mg per tablet take 1 tablet by mouth once daily 90 Tab 0    varicella-zoster recombinant, PF, (SHINGRIX, PF,) 50 mcg/0.5 mL susr injection 0.5mL by IntraMUSCular route once now and then repeat in 2-6 months 0.5 mL 1    montelukast (SINGULAIR) 10 mg tablet Daily 90 Tab 1    umeclidinium-vilanterol (ANORO ELLIPTA) 62.5-25 mcg/actuation inhaler Take 1 Puff by inhalation daily. 1 Inhaler 0    fluticasone propionate (FLONASE NA) by Nasal route.  conjugated estrogens (PREMARIN) 0.625 mg/gram vaginal cream Insert 0.5 g into vagina two (2) times a week. 2 times per week 30 g 1    albuterol (PROVENTIL VENTOLIN) 2.5 mg /3 mL (0.083 %) nebulizer solution 3 mL by Nebulization route every four (4) hours as needed for Wheezing.  1 Package 11      Lab Results   Component Value Date/Time    Cholesterol, total 200 (H) 11/05/2019 08:59 AM    HDL Cholesterol 58 11/05/2019 08:59 AM    LDL, calculated 124 (H) 11/05/2019 08:59 AM    Triglyceride 89 11/05/2019 08:59 AM     Lab Results   Component Value Date/Time    GFR est non-AA 50 (L) 11/05/2019 08:59 AM    GFRNA, POC 55 (L) 05/06/2019 01:14 PM    GFR est AA 57 (L) 11/05/2019 08:59 AM    GFRAA, POC >60 05/06/2019 01:14 PM    Creatinine 1.14 (H) 11/05/2019 08:59 AM    Creatinine (POC) 1.0 05/06/2019 01:14 PM    BUN 20 11/05/2019 08:59 AM    Sodium 144 11/05/2019 08:59 AM    Potassium 3.8 11/05/2019 08:59 AM    Chloride 103 11/05/2019 08:59 AM    CO2 26 11/05/2019 08:59 AM        Review of Systems   Respiratory: Negative for shortness of breath. Cardiovascular: Negative for chest pain. Physical Exam  Constitutional:       General: She is not in acute distress. Appearance: Normal appearance. She is not ill-appearing, toxic-appearing or diaphoretic. HENT:      Head: Normocephalic and atraumatic. Eyes:      General:         Right eye: No discharge. Left eye: No discharge. Conjunctiva/sclera: Conjunctivae normal.   Neurological:      General: No focal deficit present. Mental Status: She is alert and oriented to person, place, and time. Psychiatric:         Mood and Affect: Mood normal.         Behavior: Behavior normal.         ASSESSMENT and PLAN    ICD-10-CM ICD-9-CM    1. Essential hypertension    Controlled on losartan-hct and norvasc continue I10 401.9    2. IFG (impaired fasting glucose)    Check a1c at f/u  R73.01 790.21    3. Mixed hyperlipidemia    Diet controlled  Repeat lipids this fall  E78.2 272.2    4. Iron deficiency anemia, unspecified iron deficiency anemia type    Very mild    [place] pending    Will check cbc and iron D50.9 280.9    5. Moderate persistent asthma without complication    Results in chornic cough  Improved w flovent bid  Continue and singulair--rf J45.40 493.90    6. 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 A caregiver was present when appropriate.  Due to this being a TeleHealth encounter (During [facility] public health emergency), evaluation of the following organ systems was limited: Vitals/Constitutional/EENT/Resp/CV/GI//MS/Neuro/Skin/Heme-Lymph-Imm. Pursuant to the emergency declaration under the [address] [place] [address] and the [employer] and [employer] Act, this Virtual Visit was conducted with patient's (and/or legal guardian's) consent, to reduce the risk of exposure to COVID-19 and provide necessary medical care. Services were provided through a video synchronous discussion virtually to substitute for in-person encounter. --Ailyn Worrell MD on 5/5/2020 at 8:24 AM    An electronic signature was used to authenticate this note.

## 2020-05-05 ENCOUNTER — VIRTUAL VISIT (OUTPATIENT)
Dept: INTERNAL MEDICINE CLINIC | Age: 70
End: 2020-05-05

## 2020-05-05 DIAGNOSIS — R73.01 IFG (IMPAIRED FASTING GLUCOSE): ICD-10-CM

## 2020-05-05 DIAGNOSIS — D50.9 IRON DEFICIENCY ANEMIA, UNSPECIFIED IRON DEFICIENCY ANEMIA TYPE: ICD-10-CM

## 2020-05-05 DIAGNOSIS — E78.2 MIXED HYPERLIPIDEMIA: ICD-10-CM

## 2020-05-05 DIAGNOSIS — I10 ESSENTIAL HYPERTENSION: Primary | ICD-10-CM

## 2020-05-05 DIAGNOSIS — E66.3 OVERWEIGHT: ICD-10-CM

## 2020-05-05 DIAGNOSIS — J45.40 MODERATE PERSISTENT ASTHMA WITHOUT COMPLICATION: ICD-10-CM

## 2020-05-05 RX ORDER — LOSARTAN POTASSIUM AND HYDROCHLOROTHIAZIDE 25; 100 MG/1; MG/1
TABLET ORAL
Qty: 90 TAB | Refills: 1 | Status: SHIPPED | OUTPATIENT
Start: 2020-05-05 | End: 2020-12-04 | Stop reason: SDUPTHER

## 2020-05-05 RX ORDER — AMLODIPINE BESYLATE 2.5 MG/1
TABLET ORAL
Qty: 90 TAB | Refills: 0 | Status: SHIPPED | OUTPATIENT
Start: 2020-05-05 | End: 2020-05-30

## 2020-05-05 RX ORDER — MONTELUKAST SODIUM 10 MG/1
TABLET ORAL
Qty: 90 TAB | Refills: 1 | Status: SHIPPED | OUTPATIENT
Start: 2020-05-05 | End: 2020-12-04 | Stop reason: SDUPTHER

## 2020-05-30 RX ORDER — AMLODIPINE BESYLATE 2.5 MG/1
TABLET ORAL
Qty: 90 TAB | Refills: 0 | Status: SHIPPED | OUTPATIENT
Start: 2020-05-30 | End: 2020-08-28

## 2020-10-01 RX ORDER — AMLODIPINE BESYLATE 2.5 MG/1
TABLET ORAL
Qty: 30 TAB | Refills: 0 | Status: SHIPPED | OUTPATIENT
Start: 2020-10-01 | End: 2020-12-04 | Stop reason: SDUPTHER

## 2020-10-14 ENCOUNTER — TRANSCRIBE ORDER (OUTPATIENT)
Dept: REGISTRATION | Age: 70
End: 2020-10-14

## 2020-10-14 ENCOUNTER — HOSPITAL ENCOUNTER (OUTPATIENT)
Dept: NON INVASIVE DIAGNOSTICS | Age: 70
Discharge: HOME | End: 2020-10-14
Payer: MEDICARE

## 2020-10-14 ENCOUNTER — TELEPHONE (OUTPATIENT)
Dept: INTERNAL MEDICINE CLINIC | Age: 70
End: 2020-10-14

## 2020-10-14 DIAGNOSIS — Z41.9 SURGERY, ELECTIVE: ICD-10-CM

## 2020-10-14 DIAGNOSIS — Z41.9 SURGERY, ELECTIVE: Primary | ICD-10-CM

## 2020-10-14 LAB
ATRIAL RATE: 54 BPM
CALCULATED P AXIS, ECG09: 40 DEGREES
CALCULATED R AXIS, ECG10: 32 DEGREES
CALCULATED T AXIS, ECG11: 13 DEGREES
DIAGNOSIS, 93000: NORMAL
P-R INTERVAL, ECG05: 164 MS
Q-T INTERVAL, ECG07: 454 MS
QRS DURATION, ECG06: 90 MS
QTC CALCULATION (BEZET), ECG08: 430 MS
VENTRICULAR RATE, ECG03: 54 BPM

## 2020-10-14 PROCEDURE — 93005 ELECTROCARDIOGRAM TRACING: CPT

## 2020-10-14 NOTE — TELEPHONE ENCOUNTER
Patient states she has an Order to get an EKG done for her upcoming Wrist Surgery on 11/3/20. Patient states she doesn't think she needs a surgical clearance & currently reports she has no form but does have order to get an EKG done & wanted to know if we did that here at the office. Patient was advised she may need a Surgical clearance appt. Patient states they're doing a Local Block for the surgery. Please call to advise & discuss if appt needed with PCP.  Thank you

## 2020-10-16 NOTE — TELEPHONE ENCOUNTER
Called out and spoke to pt. Two pt identifiers confirmed. Pt states that she is unsure if she needs a preop for an upcoming procedure. Pt states her surgeon did not mention seeing Dr. Janes Ramos nor giving pt's any forms to be completed. Advised pt to call the surgeon to verify, if needing appt, call for preop appt. Pt verbalized understanding of information discussed w/ no further questions at this time.

## 2020-10-17 DIAGNOSIS — J45.40 MODERATE PERSISTENT ASTHMA WITHOUT COMPLICATION: ICD-10-CM

## 2020-10-18 RX ORDER — ALBUTEROL SULFATE 90 UG/1
AEROSOL, METERED RESPIRATORY (INHALATION)
Qty: 18 G | Refills: 0 | Status: SHIPPED | OUTPATIENT
Start: 2020-10-18 | End: 2020-12-08

## 2020-12-03 DIAGNOSIS — J45.40 MODERATE PERSISTENT ASTHMA WITHOUT COMPLICATION: ICD-10-CM

## 2020-12-03 RX ORDER — LOSARTAN POTASSIUM AND HYDROCHLOROTHIAZIDE 25; 100 MG/1; MG/1
TABLET ORAL
Qty: 90 TAB | Refills: 1 | Status: CANCELLED | OUTPATIENT
Start: 2020-12-03

## 2020-12-03 RX ORDER — AMLODIPINE BESYLATE 2.5 MG/1
TABLET ORAL
Qty: 30 TAB | Refills: 0 | Status: CANCELLED | OUTPATIENT
Start: 2020-12-03

## 2020-12-03 RX ORDER — MONTELUKAST SODIUM 10 MG/1
TABLET ORAL
Qty: 90 TAB | Refills: 1 | Status: CANCELLED | OUTPATIENT
Start: 2020-12-03

## 2020-12-03 RX ORDER — ALBUTEROL SULFATE 90 UG/1
AEROSOL, METERED RESPIRATORY (INHALATION)
Qty: 18 G | Refills: 0 | Status: CANCELLED | OUTPATIENT
Start: 2020-12-03

## 2020-12-03 NOTE — TELEPHONE ENCOUNTER
Future Appointments:  No future appointments.      Last Appointment With Me:  Visit date not found     Requested Prescriptions     Pending Prescriptions Disp Refills    losartan-hydroCHLOROthiazide (HYZAAR) 100-25 mg per tablet 90 Tab 1     Sig: take 1 tablet by mouth once daily    montelukast (SINGULAIR) 10 mg tablet 90 Tab 1     Sig: Daily    amLODIPine (NORVASC) 2.5 mg tablet 30 Tab 0    albuterol (Ventolin HFA) 90 mcg/actuation inhaler 18 g 0     Sig: INHALE 2 PUFFS BY MOUTH EVERY 4 HOURS AS NEEDED FOR WHEEZING

## 2020-12-04 DIAGNOSIS — J45.901 ASTHMA WITH EXACERBATION: ICD-10-CM

## 2020-12-07 ENCOUNTER — PATIENT MESSAGE (OUTPATIENT)
Dept: INTERNAL MEDICINE CLINIC | Age: 70
End: 2020-12-07

## 2020-12-07 DIAGNOSIS — J45.40 MODERATE PERSISTENT ASTHMA WITHOUT COMPLICATION: ICD-10-CM

## 2020-12-07 RX ORDER — MONTELUKAST SODIUM 10 MG/1
TABLET ORAL
Qty: 90 TAB | Refills: 0 | Status: SHIPPED | OUTPATIENT
Start: 2020-12-07 | End: 2021-03-05

## 2020-12-07 RX ORDER — LOSARTAN POTASSIUM AND HYDROCHLOROTHIAZIDE 25; 100 MG/1; MG/1
TABLET ORAL
Qty: 90 TAB | Refills: 0 | Status: SHIPPED | OUTPATIENT
Start: 2020-12-07 | End: 2021-03-05

## 2020-12-07 RX ORDER — AMLODIPINE BESYLATE 2.5 MG/1
2.5 TABLET ORAL DAILY
Qty: 90 TAB | Refills: 0 | Status: SHIPPED | OUTPATIENT
Start: 2020-12-07 | End: 2021-03-08 | Stop reason: SDUPTHER

## 2020-12-07 RX ORDER — ALBUTEROL SULFATE 0.83 MG/ML
2.5 SOLUTION RESPIRATORY (INHALATION)
Qty: 100 NEBULE | Refills: 1 | Status: SHIPPED | OUTPATIENT
Start: 2020-12-07 | End: 2022-06-14 | Stop reason: ALTCHOICE

## 2020-12-08 DIAGNOSIS — J45.40 MODERATE PERSISTENT ASTHMA WITHOUT COMPLICATION: ICD-10-CM

## 2020-12-08 RX ORDER — ALBUTEROL SULFATE 90 UG/1
AEROSOL, METERED RESPIRATORY (INHALATION)
Qty: 18 G | Refills: 0 | Status: SHIPPED | OUTPATIENT
Start: 2020-12-08 | End: 2020-12-21

## 2020-12-11 NOTE — PROGRESS NOTES
HISTORY OF PRESENT ILLNESS  Stefan Fung is a 72 y.o. female. HPI     Last here 5/5/20 Pt is here to f/u on chronic conditions. This is an established visit completed with telemedicine was completed with video assist  the patient acknowledges and agrees to this method of visitation  doxyme     BP at home 132/74  Continues on losartan-HCTZ 100-25mg and norvasc 2.5mg daily      Wt is 152 lbs per pt - down 6 lbs xlov   Discussed continued diet and w/l      Will resend labs      Pt now follows with Dr Lisseth Holly (derm)   Last visit was 4/19   Discussed f/u     Pt saw Dr Waylan Buerger ([facility]) for recurrent UTI sx  Pt has not had any UTIs since then   No longer taking imvexxy   Doing well - no issues recently       She saw dr Yani Greene (pulm) for cough   States asthmatic cough and she feels that this works pretty well. Felt allergies as well  Continues singulair  and flovent  Last there 3/20  Continues singulair daily  Also uses allegra  She also has albuterol to use prn for breathing - not needed recently   Recall advair caused a rash     She had wrist surgery in 9/20    Denies falls, no safety equip, drinks alcohol occasionally, no difficulty with hearing   Pt lives with      Pt is functionally independent       No memory concerns   Knows the month, date, year, location   Can recall 3/3 objects      Recall  ECHO 2/19: EF 56-60%, mild dilation of L atrial cavity    ACP not on file.  SDM is her .   Pt has this information at home and will bring it in.      PREVENTIVE:    Colonoscopy: 12/22/16, Dr. Ca Price, repeat 3 years, holding off for now   Pap: Dr. Calli Waldrop, 1/19 every other year  Mammogram: Dr. Kaila Benjamin, due 1/21  DEXA: 1/28/19, osteopenia, due 1/21  AAA: no FMHX  Tdap: 5/13/2013  Pneumovax: 10/23/17  [id number]: 10/18/2016  Zostavax: 9/25/2015  Shingrix: completed both in 2019  Flu shot: 10/20  A1c: 4/16 6.1, 10/16 6.2, 10/17 5.9, 4/18 5.6, 10/18 5.9 5/19 5.9 11/19 5.8  Eye exam: Dr. Meka Razo, due reminded  Lipids: 11/19 ldl 124  EKG: 10/23/17, nsr  AAA: 5/19, negative with CT abd     Patient Active Problem List    Diagnosis Date Noted    Cyst of tendon sheath 04/09/2012    Depression, reactive 06/27/2011    HTN (hypertension) 12/14/2009    Asthma 12/14/2009    Allergic rhinitis 12/14/2009     Current Outpatient Medications   Medication Sig Dispense Refill    albuterol (PROVENTIL HFA, VENTOLIN HFA, PROAIR HFA) 90 mcg/actuation inhaler INHALE 2 PUFFS BY MOUTH EVERY 4 HOURS AS NEEDED FOR WHEEZING 18 g 0    albuterol (PROVENTIL VENTOLIN) 2.5 mg /3 mL (0.083 %) nebu 3 mL by Nebulization route every four (4) hours as needed for Wheezing. 100 Nebule 1    losartan-hydroCHLOROthiazide (HYZAAR) 100-25 mg per tablet take 1 tablet by mouth once daily 90 Tab 0    montelukast (SINGULAIR) 10 mg tablet Daily 90 Tab 0    amLODIPine (NORVASC) 2.5 mg tablet Take 1 Tab by mouth daily. 90 Tab 0    fluticasone propionate (FLONASE NA) by Nasal route.  conjugated estrogens (PREMARIN) 0.625 mg/gram vaginal cream Insert 0.5 g into vagina two (2) times a week.  2 times per week 30 g 1     Past Surgical History:   Procedure Laterality Date    HX ORTHOPAEDIC      back      Lab Results   Component Value Date/Time    WBC 6.8 11/05/2019 08:59 AM    HGB 10.8 (L) 11/05/2019 08:59 AM    HCT 31.7 (L) 11/05/2019 08:59 AM    PLATELET 158 34/77/6861 08:59 AM    MCV 88 11/05/2019 08:59 AM     Lab Results   Component Value Date/Time    Cholesterol, total 200 (H) 11/05/2019 08:59 AM    HDL Cholesterol 58 11/05/2019 08:59 AM    LDL, calculated 124 (H) 11/05/2019 08:59 AM    Triglyceride 89 11/05/2019 08:59 AM     Lab Results   Component Value Date/Time    GFR est non-AA 50 (L) 11/05/2019 08:59 AM    GFRNA, POC 55 (L) 05/06/2019 01:14 PM    GFR est AA 57 (L) 11/05/2019 08:59 AM    GFRAA, POC >60 05/06/2019 01:14 PM    Creatinine 1.14 (H) 11/05/2019 08:59 AM    Creatinine (POC) 1.0 05/06/2019 01:14 PM    BUN 20 11/05/2019 08:59 AM Sodium 144 11/05/2019 08:59 AM    Potassium 3.8 11/05/2019 08:59 AM    Chloride 103 11/05/2019 08:59 AM    CO2 26 11/05/2019 08:59 AM        Review of Systems   Respiratory: Negative for shortness of breath. Cardiovascular: Negative for chest pain. Physical Exam  Constitutional:       General: She is not in acute distress. Appearance: Normal appearance. She is not ill-appearing, toxic-appearing or diaphoretic. HENT:      Head: Normocephalic and atraumatic. Eyes:      General:         Right eye: No discharge. Left eye: No discharge. Conjunctiva/sclera: Conjunctivae normal.   Pulmonary:      Effort: No respiratory distress. Neurological:      Mental Status: She is alert and oriented to person, place, and time. Mental status is at baseline. Gait: Gait normal.   Psychiatric:         Mood and Affect: Mood normal.         Behavior: Behavior normal.         ASSESSMENT and PLAN    ICD-10-CM ICD-9-CM    1. Essential hypertension       Controlled losartan HCTZ and Norvasc continue no change dose     I10 401.9    2. Medicare annual wellness visit, subsequent  Z00.00 V70.0    3. Encounter for screening mammogram for malignant neoplasm of breast  Z12.31 V76.12 [facility] MAMMO BI SCREENING INCL CAD   4. Postmenopausal state  Z78.0 V49.81 DEXA BONE DENSITY STUDY AXIAL   5. Moderate persistent asthma without complication       Uses Flovent as needed seeing pulmonary     J45.40 493.90    6. Seasonal allergic rhinitis due to pollen         Improved with Singulair and Flonase J30.1 477.0    7. IFG (impaired fasting glucose)       Diet controlled monitor A1c     R73.01 790.21    8. Mixed hyperlipidemia         Diet controlled E78.2 272.2    9.  Iron deficiency anemia, unspecified iron deficiency anemia type       Labs ordered to check CBC patient has not gotten these completed yet recent labs encouraged to get this completed sooner rather than later     D50.9 280.9       Depression screen reviewed and negative      Scribed by Adrian Velez, as dictated by Dr. Flores Vazquezos. Current diagnosis and concerns discussed with pt at length. Pt understands risks and benefits or current treatment plan and medications, and accepts the treatment and medication with any possible risks. Pt asks appropriate questions, which were answered. Pt was instructed to call with any concerns or problems. I have reviewed the note documented by the scribe. The services provided are my own. The documentation is accurate     Trish uRiz, who was evaluated through a synchronous (real-time) audio-video encounter, and/or her healthcare decision maker, is aware that it is a billable service, with coverage as determined by her insurance carrier. She provided verbal consent to proceed: Yes, and patient identification was verified. It was conducted pursuant to the emergency declaration under the [facility], [address] [place] [address] authority and the [employer] General Act. A caregiver was present when appropriate. Ability to conduct physical exam was limited. I was at home. The patient was at home.

## 2020-12-14 ENCOUNTER — VIRTUAL VISIT (OUTPATIENT)
Dept: INTERNAL MEDICINE CLINIC | Age: 70
End: 2020-12-14
Payer: MEDICARE

## 2020-12-14 DIAGNOSIS — Z78.0 POSTMENOPAUSAL STATE: ICD-10-CM

## 2020-12-14 DIAGNOSIS — J45.40 MODERATE PERSISTENT ASTHMA WITHOUT COMPLICATION: ICD-10-CM

## 2020-12-14 DIAGNOSIS — D50.9 IRON DEFICIENCY ANEMIA, UNSPECIFIED IRON DEFICIENCY ANEMIA TYPE: ICD-10-CM

## 2020-12-14 DIAGNOSIS — I10 ESSENTIAL HYPERTENSION: Primary | ICD-10-CM

## 2020-12-14 DIAGNOSIS — E78.2 MIXED HYPERLIPIDEMIA: ICD-10-CM

## 2020-12-14 DIAGNOSIS — Z12.31 ENCOUNTER FOR SCREENING MAMMOGRAM FOR MALIGNANT NEOPLASM OF BREAST: ICD-10-CM

## 2020-12-14 DIAGNOSIS — Z00.00 MEDICARE ANNUAL WELLNESS VISIT, SUBSEQUENT: ICD-10-CM

## 2020-12-14 DIAGNOSIS — J30.1 SEASONAL ALLERGIC RHINITIS DUE TO POLLEN: ICD-10-CM

## 2020-12-14 DIAGNOSIS — R73.01 IFG (IMPAIRED FASTING GLUCOSE): ICD-10-CM

## 2020-12-14 PROCEDURE — 1090F PRES/ABSN URINE INCON ASSESS: CPT | Performed by: INTERNAL MEDICINE

## 2020-12-14 PROCEDURE — 3017F COLORECTAL CA SCREEN DOC REV: CPT | Performed by: INTERNAL MEDICINE

## 2020-12-14 PROCEDURE — 99214 OFFICE O/P EST MOD 30 MIN: CPT | Performed by: INTERNAL MEDICINE

## 2020-12-14 PROCEDURE — G8536 NO DOC ELDER MAL SCRN: HCPCS | Performed by: INTERNAL MEDICINE

## 2020-12-14 PROCEDURE — G8427 DOCREV CUR MEDS BY ELIG CLIN: HCPCS | Performed by: INTERNAL MEDICINE

## 2020-12-14 PROCEDURE — G0439 PPPS, SUBSEQ VISIT: HCPCS | Performed by: INTERNAL MEDICINE

## 2020-12-14 PROCEDURE — G9899 SCRN MAM PERF RSLTS DOC: HCPCS | Performed by: INTERNAL MEDICINE

## 2020-12-14 PROCEDURE — G8421 BMI NOT CALCULATED: HCPCS | Performed by: INTERNAL MEDICINE

## 2020-12-14 PROCEDURE — G0463 HOSPITAL OUTPT CLINIC VISIT: HCPCS | Performed by: INTERNAL MEDICINE

## 2020-12-14 PROCEDURE — G8399 PT W/DXA RESULTS DOCUMENT: HCPCS | Performed by: INTERNAL MEDICINE

## 2020-12-14 PROCEDURE — G9717 DOC PT DX DEP/BP F/U NT REQ: HCPCS | Performed by: INTERNAL MEDICINE

## 2020-12-14 PROCEDURE — G8756 NO BP MEASURE DOC: HCPCS | Performed by: INTERNAL MEDICINE

## 2020-12-14 PROCEDURE — 1101F PT FALLS ASSESS-DOCD LE1/YR: CPT | Performed by: INTERNAL MEDICINE

## 2020-12-14 NOTE — PATIENT INSTRUCTIONS
Medicare Wellness Visit, Female The best way to live healthy is to have a lifestyle where you eat a well-balanced diet, exercise regularly, limit alcohol use, and quit all forms of tobacco/nicotine, if applicable. Regular preventive services are another way to keep healthy. Preventive services (vaccines, screening tests, monitoring & exams) can help personalize your care plan, which helps you manage your own care. Screening tests can find health problems at the earliest stages, when they are easiest to treat. [facility] follows the current, evidence-based guidelines published by the [facility] ([facility]) when recommending preventive services for our patients. Because we follow these guidelines, sometimes recommendations change over time as research supports it. (For example, mammograms used to be recommended annually. Even though Medicare will still pay for an annual mammogram, the newer guidelines recommend a mammogram every two years for women of average risk). Of course, you and your doctor may decide to screen more often for some diseases, based on your risk and your co-morbidities (chronic disease you are already diagnosed with). Preventive services for you include: - Medicare offers their members a free annual wellness visit, which is time for you and your primary care provider to discuss and plan for your preventive service needs. Take advantage of this benefit every year! 
-All adults over the age of 72 should receive the recommended pneumonia vaccines. Current USPSTF guidelines recommend a series of two vaccines for the best pneumonia protection.  
-All adults should have a flu vaccine yearly and a tetanus vaccine every 10 years.  
-All adults age 48 and older should receive the shingles vaccines (series of two vaccines).      
-All adults age 38-68 who are overweight should have a diabetes screening test once every three years.  
-All adults born between [birth year] and 1964 should be screened once for Hepatitis C. 
-Other screening tests and preventive services for persons with diabetes include: an eye exam to screen for diabetic retinopathy, a kidney function test, a foot exam, and stricter control over your cholesterol.  
-Cardiovascular screening for adults with routine risk involves an electrocardiogram (ECG) at intervals determined by your doctor.  
-Colorectal cancer screenings should be done for adults age 54-65 with no increased risk factors for colorectal cancer. There are a number of acceptable methods of screening for this type of cancer. Each test has its own benefits and drawbacks. Discuss with your doctor what is most appropriate for you during your annual wellness visit. The different tests include: colonoscopy (considered the best screening method), a fecal occult blood test, a fecal DNA test, and sigmoidoscopy. 
 
-A bone mass density test is recommended when a woman turns 65 to screen for osteoporosis. This test is only recommended one time, as a screening. Some providers will use this same test as a disease monitoring tool if you already have osteoporosis. -Breast cancer screenings are recommended every other year for women of normal risk, age 54-69. 
-Cervical cancer screenings for women over age 72 are only recommended with certain risk factors. Here is a list of your current Health Maintenance items (your personalized list of preventive services) with a due date: 
Health Maintenance Due Topic Date Due  Glaucoma Screening   03/12/2020 [address] [place] [address] Annual Well Visit  11/05/2020

## 2020-12-14 NOTE — PROGRESS NOTES
This is the Subsequent Medicare Annual Wellness Exam, performed 12 months or more after the Initial AWV or the last Subsequent AWV    I have reviewed the patient's medical history in detail and updated the computerized patient record. Depression Risk Factor Screening:     3 most recent PHQ Screens 11/5/2019   Little interest or pleasure in doing things Not at all   Feeling down, depressed, irritable, or hopeless Not at all   Total Score PHQ 2 0       Alcohol Risk Screen   Do you average more than 1 drink per night or more than 7 drinks a week:  No    On any one occasion in the past three months have you have had more than 3 drinks containing alcohol:  No    occasional    Functional Ability and Level of Safety:   Hearing: Hearing is good. Activities of Daily Living: The home contains: no safety equipment. Patient does total self care     Ambulation: with no difficulty     Fall Risk:  Fall Risk Assessment, last 12 mths 11/5/2019   Able to walk? Yes   Fall in past 12 months? No     Abuse Screen:  Patient is not abused   lives w     Cognitive Screening   Has your family/caregiver stated any concerns about your memory: no    Cognitive Screening: Normal - Mini Cog Test        No memory concerns   Pt knows the month, day and year   Can recall 3/3 objects   Assessment/Plan   Education and counseling provided:  Are appropriate based on today's review and evaluation  End-of-Life planning (with patient's consent)  Influenza Vaccine  Screening Mammography  Screening Pap and pelvic (covered once every 2 years)  Bone mass measurement (DEXA)    Diagnoses and all orders for this visit:    1. Medicare annual wellness visit, subsequent    2. Encounter for screening mammogram for malignant neoplasm of breast  -     [facility] MAMMO BI SCREENING INCL CAD; Future    3. Postmenopausal state  -     DEXA BONE DENSITY STUDY AXIAL;  Future        Health Maintenance Due     Health Maintenance Due   Topic Date Due    GLAUCOMA SCREENING Q2Y  03/12/2020    Medicare Yearly Exam  11/05/2020       Patient Care Team   Patient Care Team:  Edward Zamora MD as PCP - General (Internal Medicine)  Edward Zamora MD as PCP - [facility]  Cortney Dee MD (Ophthalmology)  Katlin Stanton MD (Gastroenterology)  Ladan Stack MD (Obstetrics & Gynecology)  Matteo Cruz MD (Orthopedic Surgery)  Meryl Desai MD (Dermatology)  Ashely Clinton MD (Female Pelvic Medicine and Reconstructive Surgery)  Heaven Morrow MD (Internal Medicine)     updated    History     Patient Active Problem List   Diagnosis Code    HTN (hypertension) I10    Asthma J45.909    Allergic rhinitis J30.9    Depression, reactive F32.9    Cyst of tendon sheath M67.80     Past Medical History:   Diagnosis Date    Ankle sprain 03/2014    right    Asthma 12/14/2009    HTN (hypertension) 12/14/2009    Iron deficiency anemia 12/14/2009      Past Surgical History:   Procedure Laterality Date    HX ORTHOPAEDIC      back     Current Outpatient Medications   Medication Sig Dispense Refill    albuterol (PROVENTIL HFA, VENTOLIN HFA, PROAIR HFA) 90 mcg/actuation inhaler INHALE 2 PUFFS BY MOUTH EVERY 4 HOURS AS NEEDED FOR WHEEZING 18 g 0    albuterol (PROVENTIL VENTOLIN) 2.5 mg /3 mL (0.083 %) nebu 3 mL by Nebulization route every four (4) hours as needed for Wheezing. 100 Nebule 1    losartan-hydroCHLOROthiazide (HYZAAR) 100-25 mg per tablet take 1 tablet by mouth once daily 90 Tab 0    montelukast (SINGULAIR) 10 mg tablet Daily 90 Tab 0    amLODIPine (NORVASC) 2.5 mg tablet Take 1 Tab by mouth daily. 90 Tab 0    fluticasone propionate (FLONASE NA) by Nasal route.  conjugated estrogens (PREMARIN) 0.625 mg/gram vaginal cream Insert 0.5 g into vagina two (2) times a week.  2 times per week 30 g 1     Allergies   Allergen Reactions    Acetylcarnitine Swelling    Ace Inhibitors Cough    Aleve [Naproxen Sodium] Hives    Daypro [Oxaprozin] Rash    Effexor [Venlafaxine] Other (comments)     Abdominal pain    Prednisone Swelling     Face swelling ONLY with large dosage    Shellfish Containing Products Swelling     Throat and tounge       No family history on file. Social History     Tobacco Use    Smoking status: Never Smoker    Smokeless tobacco: Never Used   Substance Use Topics    Alcohol use: Yes     Alcohol/week: 0.8 standard drinks     Types: 1 Glasses of wine per week     Comment: occasionally     ACP not on file.  SDM is her . Pt has this information at home and will bring it in.        Colonoscopy: 12/22/16, Dr. Epifanio Palmer, repeat 3 years, holding off for now she is aware postponed due to Covid  Pap: Dr. Duyen Hernández, 1/19 every other year due January 21  Mammogram: Dr. Meghan Olivo, due 1/21 ordered  DEXA: 1/28/19, osteopenia, due 1/21 ordered    Tdap: 5/13/2013  Pneumovax: 10/23/17  Abccdtu22: 10/18/2016  Zostavax: 9/25/2015  Shingrix: completed both in 2019  Flu shot: 10/20      A1c: 11/19 5.8 due now    Eye exam: Dr. Kimber Patel, due reminded    Tiffany Bone 124 due now  EKG: 10/23/17, nsr  AAA: 5/19, negative with CT abd       Medication reconciliation completed by MA and reviewed by me. Medical/surgical/social/family history reviewed and updated by me. Patient provided AVS and preventative screening table. Patient verbalized understanding of all information discussed. Trish Ruiz, who was evaluated through a synchronous (real-time) audio-video encounter, and/or her healthcare decision maker, is aware that it is a billable service, with coverage as determined by her insurance carrier. She provided verbal consent to proceed: Yes, and patient identification was verified. It was conducted pursuant to the emergency declaration under the [address] [place] [address], [address] [place] [address] [facility] authority and the [employer] General Act.  A caregiver was present when appropriate. Ability to conduct physical exam was limited. I was at home. The patient was at home.     Ravi Joseph MD

## 2020-12-21 DIAGNOSIS — J45.40 MODERATE PERSISTENT ASTHMA WITHOUT COMPLICATION: ICD-10-CM

## 2020-12-21 RX ORDER — ALBUTEROL SULFATE 90 UG/1
AEROSOL, METERED RESPIRATORY (INHALATION)
Qty: 18 G | Refills: 0 | Status: SHIPPED | OUTPATIENT
Start: 2020-12-21 | End: 2021-01-04

## 2021-01-03 DIAGNOSIS — J45.40 MODERATE PERSISTENT ASTHMA WITHOUT COMPLICATION: ICD-10-CM

## 2021-01-04 RX ORDER — ALBUTEROL SULFATE 90 UG/1
AEROSOL, METERED RESPIRATORY (INHALATION)
Qty: 18 G | Refills: 0 | Status: SHIPPED | OUTPATIENT
Start: 2021-01-04 | End: 2021-01-17

## 2021-01-17 DIAGNOSIS — J45.40 MODERATE PERSISTENT ASTHMA WITHOUT COMPLICATION: ICD-10-CM

## 2021-01-17 RX ORDER — ALBUTEROL SULFATE 90 UG/1
AEROSOL, METERED RESPIRATORY (INHALATION)
Qty: 18 G | Refills: 0 | Status: SHIPPED | OUTPATIENT
Start: 2021-01-17 | End: 2021-06-15

## 2021-03-05 RX ORDER — LOSARTAN POTASSIUM AND HYDROCHLOROTHIAZIDE 25; 100 MG/1; MG/1
TABLET ORAL
Qty: 90 TAB | Refills: 0 | Status: SHIPPED | OUTPATIENT
Start: 2021-03-05 | End: 2021-06-03

## 2021-03-05 RX ORDER — MONTELUKAST SODIUM 10 MG/1
TABLET ORAL
Qty: 90 TAB | Refills: 0 | Status: SHIPPED | OUTPATIENT
Start: 2021-03-05 | End: 2021-06-03

## 2021-03-08 RX ORDER — AMLODIPINE BESYLATE 2.5 MG/1
2.5 TABLET ORAL DAILY
Qty: 90 TAB | Refills: 1 | Status: SHIPPED | OUTPATIENT
Start: 2021-03-08 | End: 2021-06-03

## 2021-03-08 NOTE — TELEPHONE ENCOUNTER
Future Appointments:  Future Appointments   Date Time Provider Joe Levine   6/15/2021  8:45 AM Lamont Marie MD [facility] BS AMB        Last Appointment With Me:  12/14/2020     Requested Prescriptions     Pending Prescriptions Disp Refills    amLODIPine (NORVASC) 2.5 mg tablet 90 Tab 0     Sig: Take 1 Tab by mouth daily.

## 2021-06-03 RX ORDER — AMLODIPINE BESYLATE 2.5 MG/1
TABLET ORAL
Qty: 90 TABLET | Refills: 1 | Status: SHIPPED | OUTPATIENT
Start: 2021-06-03 | End: 2021-12-20

## 2021-06-03 RX ORDER — MONTELUKAST SODIUM 10 MG/1
TABLET ORAL
Qty: 90 TABLET | Refills: 0 | Status: SHIPPED | OUTPATIENT
Start: 2021-06-03 | End: 2021-09-03

## 2021-06-03 RX ORDER — LOSARTAN POTASSIUM AND HYDROCHLOROTHIAZIDE 25; 100 MG/1; MG/1
TABLET ORAL
Qty: 90 TABLET | Refills: 0 | Status: SHIPPED | OUTPATIENT
Start: 2021-06-03 | End: 2021-09-03

## 2021-06-11 NOTE — PROGRESS NOTES
HISTORY OF PRESENT ILLNESS  Rae Montesinos is a 72 y.o. female. HPI     Last here 12/14/20 Pt is here to f/u on chronic conditions.     BP today is 100/62  BP at home 110/70 115/72  Denies lightheadedness   Continues on losartan-HCTZ 100-25mg and norvasc 2.5mg daily   Discussed if her bp becomes lower and has lightheadedness to call clinic so we can adjust meds, will stop norvasc if this happens     Wt is 153 lbs - up 1 lb x lov    Discussed continued diet and w/l      Reviewed labs  Will get labs today     Pt now follows with Dr Govind Guerrero (derm)   Last visit was 4/19   Discussed f/u     Pt saw Dr Evelio Campos ([facility] gyn) for recurrent UTI sx  Pt has not had any UTIs since then   No longer taking imvexxy   Doing well - no issues recently       She saw dr Froylan Walker (pulm) for cough   Last there 3/20  Continues singulair daily and flovent once daily   She will use flovent BID with worsening sxs   Also uses allegra  She also has albuterol to use prn for breathing - not needed recently   Recall advair caused a rash     She had thumb surgery with Dr. Patience Castillo (ortho) for de quervain's tenosynovitis       Has slight murmur on exam  Recall  ECHO 2/19: EF 56-60%, mild dilation of L atrial cavity     ACP not on file.  SDM is her .   Pt has this information at home and will bring it in.      PREVENTIVE:    Colonoscopy: 12/22/16, Dr. Jalyn Carlton, repeat 3 years, due reminded  Pap: Dr. Alicia Patterson other year, due reminded  Mammogram: Dr. Roya Shelton, due reminded  DEXA: 1/28/19, osteopenia, due reminded  AAA: no FMHX  Tdap: 5/13/2013  Pneumovax: 10/23/17  [id number]: 10/18/2016  Zostavax: 9/25/2015  Shingrix: completed both in 2019  Flu shot: 10/20  A1c: 4/16 6.1, 10/16 6.2, 10/17 5.9, 4/18 5.6, 10/18 5.9 5/19 5.9 11/19 5.8  Eye exam: Dr. Mary Anne Morgan, due reminded  Lorna Prado 124  EKG: 10/23/17, nsr  AAA: 5/19, negative with CT abd     Patient Active Problem List    Diagnosis Date Noted    Cyst of tendon sheath 04/09/2012    Depression, reactive 06/27/2011    HTN (hypertension) 12/14/2009    Asthma 12/14/2009    Allergic rhinitis 12/14/2009     Current Outpatient Medications   Medication Sig Dispense Refill    fluticasone propionate (Flovent HFA) 110 mcg/actuation inhaler Take 1 Puff by inhalation.  losartan-hydroCHLOROthiazide (HYZAAR) 100-25 mg per tablet TAKE 1 TABLET BY MOUTH EVERY DAY 90 Tablet 0    montelukast (SINGULAIR) 10 mg tablet TAKE 1 TABLET BY MOUTH EVERY DAY 90 Tablet 0    amLODIPine (NORVASC) 2.5 mg tablet TAKE 1 TABLET BY MOUTH EVERY DAY 90 Tablet 1    albuterol (Ventolin HFA) 90 mcg/actuation inhaler INHALE 2 PUFFS BY MOUTH EVERY 4 HOURS AS NEEDED FOR WHEEZING 18 g 0    albuterol (PROVENTIL VENTOLIN) 2.5 mg /3 mL (0.083 %) nebu 3 mL by Nebulization route every four (4) hours as needed for Wheezing. 100 Nebule 1    fluticasone propionate (FLONASE NA) by Nasal route.  conjugated estrogens (PREMARIN) 0.625 mg/gram vaginal cream Insert 0.5 g into vagina two (2) times a week.  2 times per week 30 g 1     Past Surgical History:   Procedure Laterality Date    HX ORTHOPAEDIC      back      Lab Results   Component Value Date/Time    WBC 6.8 11/05/2019 08:59 AM    HGB 10.8 (L) 11/05/2019 08:59 AM    HCT 31.7 (L) 11/05/2019 08:59 AM    PLATELET 275 99/06/4154 08:59 AM    MCV 88 11/05/2019 08:59 AM     Lab Results   Component Value Date/Time    Cholesterol, total 200 (H) 11/05/2019 08:59 AM    HDL Cholesterol 58 11/05/2019 08:59 AM    LDL, calculated 124 (H) 11/05/2019 08:59 AM    Triglyceride 89 11/05/2019 08:59 AM     Lab Results   Component Value Date/Time    GFR est non-AA 50 (L) 11/05/2019 08:59 AM    GFRNA, POC 55 (L) 05/06/2019 01:14 PM    GFR est AA 57 (L) 11/05/2019 08:59 AM    GFRAA, POC >60 05/06/2019 01:14 PM    Creatinine 1.14 (H) 11/05/2019 08:59 AM    Creatinine (POC) 1.0 05/06/2019 01:14 PM    BUN 20 11/05/2019 08:59 AM    Sodium 144 11/05/2019 08:59 AM    Potassium 3.8 11/05/2019 08:59 AM Chloride 103 11/05/2019 08:59 AM    CO2 26 11/05/2019 08:59 AM        Review of Systems   Respiratory: Negative for shortness of breath. Cardiovascular: Negative for chest pain. Physical Exam  Constitutional:       General: She is not in acute distress. Appearance: Normal appearance. She is not ill-appearing, toxic-appearing or diaphoretic. HENT:      Head: Normocephalic and atraumatic. Right Ear: External ear normal.      Left Ear: External ear normal.   Eyes:      General:         Right eye: No discharge. Left eye: No discharge. Conjunctiva/sclera: Conjunctivae normal.      Pupils: Pupils are equal, round, and reactive to light. Cardiovascular:      Rate and Rhythm: Normal rate and regular rhythm. Pulses: Normal pulses. Heart sounds: Murmur (slight) heard. No friction rub. No gallop. Pulmonary:      Effort: No respiratory distress. Breath sounds: Normal breath sounds. No wheezing or rales. Chest:      Chest wall: No tenderness. Musculoskeletal:         General: Normal range of motion. Cervical back: Normal range of motion and neck supple. Right lower leg: No edema. Left lower leg: No edema. Skin:     General: Skin is warm and dry. Neurological:      Mental Status: She is alert and oriented to person, place, and time. Mental status is at baseline. Coordination: Coordination normal.      Gait: Gait normal.   Psychiatric:         Mood and Affect: Mood normal.         Behavior: Behavior normal.         ASSESSMENT and PLAN    ICD-10-CM ICD-9-CM    1.  Essential hypertension             Controlled on losartan hydrochlorothiazide and Norvasc    No orthostatic symptoms at all    Blood pressure higher at home than here    We will continue current regimen    However discussed if systolic pressures remain in the 100 range or lower at home would need to stop Norvasc she expressed understanding and let me know if anything changes    Check basic labs         I10 401.9 LIPID PANEL      METABOLIC PANEL, COMPREHENSIVE      CBC W/O DIFF      TSH 3RD GENERATION      HEMOGLOBIN A1C WITH EAG      FERRITIN      IRON PROFILE   2. Depression, reactive  F32.9 300.4 LIPID PANEL      METABOLIC PANEL, COMPREHENSIVE      CBC W/O DIFF   Patient the past no current issues   TSH 3RD GENERATION      HEMOGLOBIN A1C WITH EAG      FERRITIN      IRON PROFILE   3. Moderate persistent asthma without complication  K30.48 829.08 LIPID PANEL      METABOLIC PANEL, COMPREHENSIVE      CBC W/O DIFF   On Flovent once daily doing well   TSH 3RD GENERATION      HEMOGLOBIN A1C WITH EAG      FERRITIN      IRON PROFILE   4. Seasonal allergic rhinitis due to pollen  J30.1 477.0 LIPID PANEL      METABOLIC PANEL, COMPREHENSIVE      CBC W/O DIFF      TSH 3RD GENERATION   Controlled on Singulair       HEMOGLOBIN A1C WITH EAG      FERRITIN      IRON PROFILE   5. Mixed hyperlipidemia  E78.2 272.2 LIPID PANEL      METABOLIC PANEL, COMPREHENSIVE      CBC W/O DIFF   Diet controlled check lipids treat as needed   TSH 3RD GENERATION      HEMOGLOBIN A1C WITH EAG      FERRITIN      IRON PROFILE   6. IFG (impaired fasting glucose)  R73.01 790.21 LIPID PANEL      METABOLIC PANEL, COMPREHENSIVE   Check A1c   CBC W/O DIFF      TSH 3RD GENERATION      HEMOGLOBIN A1C WITH EAG      FERRITIN      IRON PROFILE   7. Iron deficiency anemia, unspecified iron deficiency anemia type  D50.9 280.9 LIPID PANEL      METABOLIC PANEL, COMPREHENSIVE      CBC W/O DIFF   Check blood counts repeat iron levels overdue for colonoscopy she is aware and will get this done       TSH 3RD GENERATION      HEMOGLOBIN A1C WITH EAG      FERRITIN      IRON PROFILE           Scribed by Amna Clay of [address] [place] [address] [address], as dictated by Dr. Shreya Torres. Current diagnosis and concerns discussed with pt at length.  Pt understands risks and benefits or current treatment plan and medications, and accepts the treatment and medication with any possible risks. Pt asks appropriate questions, which were answered. Pt was instructed to call with any concerns or problems. I have reviewed the note documented by the scribe. The services provided are my own.   The documentation is accurate

## 2021-06-15 ENCOUNTER — OFFICE VISIT (OUTPATIENT)
Dept: INTERNAL MEDICINE CLINIC | Age: 71
End: 2021-06-15
Payer: MEDICARE

## 2021-06-15 VITALS
RESPIRATION RATE: 16 BRPM | OXYGEN SATURATION: 97 % | SYSTOLIC BLOOD PRESSURE: 100 MMHG | TEMPERATURE: 98.1 F | HEIGHT: 63 IN | WEIGHT: 153.6 LBS | BODY MASS INDEX: 27.21 KG/M2 | HEART RATE: 70 BPM | DIASTOLIC BLOOD PRESSURE: 62 MMHG

## 2021-06-15 DIAGNOSIS — J45.40 MODERATE PERSISTENT ASTHMA WITHOUT COMPLICATION: ICD-10-CM

## 2021-06-15 DIAGNOSIS — I10 ESSENTIAL HYPERTENSION: Primary | ICD-10-CM

## 2021-06-15 DIAGNOSIS — F32.9 DEPRESSION, REACTIVE: ICD-10-CM

## 2021-06-15 DIAGNOSIS — R73.01 IFG (IMPAIRED FASTING GLUCOSE): ICD-10-CM

## 2021-06-15 DIAGNOSIS — J30.1 SEASONAL ALLERGIC RHINITIS DUE TO POLLEN: ICD-10-CM

## 2021-06-15 DIAGNOSIS — E78.2 MIXED HYPERLIPIDEMIA: ICD-10-CM

## 2021-06-15 DIAGNOSIS — D50.9 IRON DEFICIENCY ANEMIA, UNSPECIFIED IRON DEFICIENCY ANEMIA TYPE: ICD-10-CM

## 2021-06-15 LAB
ALBUMIN SERPL-MCNC: 3.8 G/DL (ref 3.5–5)
ALBUMIN/GLOB SERPL: 1.1 {RATIO} (ref 1.1–2.2)
ALP SERPL-CCNC: 100 U/L (ref 45–117)
ALT SERPL-CCNC: 29 U/L (ref 12–78)
ANION GAP SERPL CALC-SCNC: 6 MMOL/L (ref 5–15)
AST SERPL-CCNC: 18 U/L (ref 15–37)
BILIRUB SERPL-MCNC: 0.2 MG/DL (ref 0.2–1)
BUN SERPL-MCNC: 29 MG/DL (ref 6–20)
BUN/CREAT SERPL: 23 (ref 12–20)
CALCIUM SERPL-MCNC: 9.2 MG/DL (ref 8.5–10.1)
CHLORIDE SERPL-SCNC: 108 MMOL/L (ref 97–108)
CHOLEST SERPL-MCNC: 204 MG/DL
CO2 SERPL-SCNC: 28 MMOL/L (ref 21–32)
COMMENT, HOLDF: NORMAL
CREAT SERPL-MCNC: 1.28 MG/DL (ref 0.55–1.02)
ERYTHROCYTE [DISTWIDTH] IN BLOOD BY AUTOMATED COUNT: 12.5 % (ref 11.5–14.5)
EST. AVERAGE GLUCOSE BLD GHB EST-MCNC: 120 MG/DL
FERRITIN SERPL-MCNC: 104 NG/ML (ref 26–388)
GLOBULIN SER CALC-MCNC: 3.4 G/DL (ref 2–4)
GLUCOSE SERPL-MCNC: 99 MG/DL (ref 65–100)
HBA1C MFR BLD: 5.8 % (ref 4–5.6)
HCT VFR BLD AUTO: 31.2 % (ref 35–47)
HDLC SERPL-MCNC: 60 MG/DL
HDLC SERPL: 3.4 {RATIO} (ref 0–5)
HGB BLD-MCNC: 9.8 G/DL (ref 11.5–16)
IRON SATN MFR SERPL: 19 % (ref 20–50)
IRON SERPL-MCNC: 57 UG/DL (ref 35–150)
LDLC SERPL CALC-MCNC: 127 MG/DL (ref 0–100)
MCH RBC QN AUTO: 29.6 PG (ref 26–34)
MCHC RBC AUTO-ENTMCNC: 31.4 G/DL (ref 30–36.5)
MCV RBC AUTO: 94.3 FL (ref 80–99)
NRBC # BLD: 0 K/UL (ref 0–0.01)
NRBC BLD-RTO: 0 PER 100 WBC
PLATELET # BLD AUTO: 351 K/UL (ref 150–400)
PMV BLD AUTO: 9.4 FL (ref 8.9–12.9)
POTASSIUM SERPL-SCNC: 3.7 MMOL/L (ref 3.5–5.1)
PROT SERPL-MCNC: 7.2 G/DL (ref 6.4–8.2)
RBC # BLD AUTO: 3.31 M/UL (ref 3.8–5.2)
SAMPLES BEING HELD,HOLD: NORMAL
SODIUM SERPL-SCNC: 142 MMOL/L (ref 136–145)
TIBC SERPL-MCNC: 302 UG/DL (ref 250–450)
TRIGL SERPL-MCNC: 85 MG/DL (ref ?–150)
TSH SERPL DL<=0.05 MIU/L-ACNC: 1.66 UIU/ML (ref 0.36–3.74)
VLDLC SERPL CALC-MCNC: 17 MG/DL
WBC # BLD AUTO: 7.5 K/UL (ref 3.6–11)

## 2021-06-15 PROCEDURE — 1101F PT FALLS ASSESS-DOCD LE1/YR: CPT | Performed by: INTERNAL MEDICINE

## 2021-06-15 PROCEDURE — G0463 HOSPITAL OUTPT CLINIC VISIT: HCPCS | Performed by: INTERNAL MEDICINE

## 2021-06-15 PROCEDURE — 1090F PRES/ABSN URINE INCON ASSESS: CPT | Performed by: INTERNAL MEDICINE

## 2021-06-15 PROCEDURE — G8419 CALC BMI OUT NRM PARAM NOF/U: HCPCS | Performed by: INTERNAL MEDICINE

## 2021-06-15 PROCEDURE — 3017F COLORECTAL CA SCREEN DOC REV: CPT | Performed by: INTERNAL MEDICINE

## 2021-06-15 PROCEDURE — G8536 NO DOC ELDER MAL SCRN: HCPCS | Performed by: INTERNAL MEDICINE

## 2021-06-15 PROCEDURE — G8399 PT W/DXA RESULTS DOCUMENT: HCPCS | Performed by: INTERNAL MEDICINE

## 2021-06-15 PROCEDURE — G8752 SYS BP LESS 140: HCPCS | Performed by: INTERNAL MEDICINE

## 2021-06-15 PROCEDURE — 99214 OFFICE O/P EST MOD 30 MIN: CPT | Performed by: INTERNAL MEDICINE

## 2021-06-15 PROCEDURE — G8754 DIAS BP LESS 90: HCPCS | Performed by: INTERNAL MEDICINE

## 2021-06-15 PROCEDURE — G9899 SCRN MAM PERF RSLTS DOC: HCPCS | Performed by: INTERNAL MEDICINE

## 2021-06-15 PROCEDURE — G8427 DOCREV CUR MEDS BY ELIG CLIN: HCPCS | Performed by: INTERNAL MEDICINE

## 2021-06-15 PROCEDURE — G9717 DOC PT DX DEP/BP F/U NT REQ: HCPCS | Performed by: INTERNAL MEDICINE

## 2021-06-15 RX ORDER — ALBUTEROL SULFATE 90 UG/1
AEROSOL, METERED RESPIRATORY (INHALATION)
Qty: 18 G | Refills: 0 | Status: SHIPPED | OUTPATIENT
Start: 2021-06-15 | End: 2021-08-10

## 2021-06-15 RX ORDER — FLUTICASONE PROPIONATE 110 UG/1
1 AEROSOL, METERED RESPIRATORY (INHALATION)
COMMUNITY
End: 2021-11-18 | Stop reason: CLARIF

## 2021-06-16 NOTE — PROGRESS NOTES
Called and notified pt. Pt states she does not need a referral, but gave her the names of Dr. Yayo Stallworth and Quang Melendez for Hematology. Pt states she will call for an appointment.

## 2021-06-16 NOTE — PROGRESS NOTES
Please call patient back about results  Progressive anemia, normal iron  Have her see hematology at this time for further eval

## 2021-08-09 DIAGNOSIS — J45.40 MODERATE PERSISTENT ASTHMA WITHOUT COMPLICATION: ICD-10-CM

## 2021-08-10 RX ORDER — ALBUTEROL SULFATE 90 UG/1
AEROSOL, METERED RESPIRATORY (INHALATION)
Qty: 18 G | Refills: 0 | Status: SHIPPED | OUTPATIENT
Start: 2021-08-10 | End: 2021-08-26

## 2021-09-03 RX ORDER — LOSARTAN POTASSIUM AND HYDROCHLOROTHIAZIDE 25; 100 MG/1; MG/1
TABLET ORAL
Qty: 90 TABLET | Refills: 0 | Status: SHIPPED | OUTPATIENT
Start: 2021-09-03 | End: 2021-12-05

## 2021-09-03 RX ORDER — MONTELUKAST SODIUM 10 MG/1
TABLET ORAL
Qty: 90 TABLET | Refills: 0 | Status: SHIPPED | OUTPATIENT
Start: 2021-09-03 | End: 2021-12-05

## 2021-11-15 ENCOUNTER — HOSPITAL ENCOUNTER (OUTPATIENT)
Dept: PREADMISSION TESTING | Age: 71
Discharge: HOME | End: 2021-11-15
Payer: MEDICARE

## 2021-11-15 PROCEDURE — U0005 INFEC AGEN DETEC AMPLI PROBE: HCPCS

## 2021-11-16 LAB
SARS-COV-2, XPLCVT: NOT DETECTED
SOURCE, COVRS: NORMAL

## 2021-11-19 ENCOUNTER — ANESTHESIA EVENT (OUTPATIENT)
Dept: ENDOSCOPY | Age: 71
End: 2021-11-19
Payer: MEDICARE

## 2021-11-19 ENCOUNTER — ANESTHESIA (OUTPATIENT)
Dept: ENDOSCOPY | Age: 71
End: 2021-11-19
Payer: MEDICARE

## 2021-11-19 ENCOUNTER — HOSPITAL ENCOUNTER (OUTPATIENT)
Age: 71
Setting detail: OUTPATIENT SURGERY
Discharge: HOME | End: 2021-11-19
Attending: INTERNAL MEDICINE | Admitting: INTERNAL MEDICINE
Payer: MEDICARE

## 2021-11-19 VITALS
WEIGHT: 151.1 LBS | SYSTOLIC BLOOD PRESSURE: 129 MMHG | DIASTOLIC BLOOD PRESSURE: 70 MMHG | HEART RATE: 68 BPM | HEIGHT: 62 IN | OXYGEN SATURATION: 98 % | RESPIRATION RATE: 16 BRPM | TEMPERATURE: 97.8 F | BODY MASS INDEX: 27.81 KG/M2

## 2021-11-19 PROCEDURE — 77030013992 HC SNR POLYP ENDOSC BSC -B: Performed by: INTERNAL MEDICINE

## 2021-11-19 PROCEDURE — 88305 TISSUE EXAM BY PATHOLOGIST: CPT

## 2021-11-19 PROCEDURE — 74011000250 HC RX REV CODE- 250: Performed by: NURSE ANESTHETIST, CERTIFIED REGISTERED

## 2021-11-19 PROCEDURE — 76060000031 HC ANESTHESIA FIRST 0.5 HR: Performed by: INTERNAL MEDICINE

## 2021-11-19 PROCEDURE — 2709999900 HC NON-CHARGEABLE SUPPLY: Performed by: INTERNAL MEDICINE

## 2021-11-19 PROCEDURE — 74011250636 HC RX REV CODE- 250/636: Performed by: INTERNAL MEDICINE

## 2021-11-19 PROCEDURE — 74011250636 HC RX REV CODE- 250/636: Performed by: NURSE ANESTHETIST, CERTIFIED REGISTERED

## 2021-11-19 PROCEDURE — 76040000019: Performed by: INTERNAL MEDICINE

## 2021-11-19 RX ORDER — SODIUM CHLORIDE 9 MG/ML
100 INJECTION, SOLUTION INTRAVENOUS CONTINUOUS
Status: DISCONTINUED | OUTPATIENT
Start: 2021-11-19 | End: 2021-11-19 | Stop reason: HOSPADM

## 2021-11-19 RX ORDER — EPINEPHRINE 0.1 MG/ML
1 INJECTION INTRACARDIAC; INTRAVENOUS
Status: DISCONTINUED | OUTPATIENT
Start: 2021-11-19 | End: 2021-11-19 | Stop reason: HOSPADM

## 2021-11-19 RX ORDER — FLUMAZENIL 0.1 MG/ML
0.2 INJECTION INTRAVENOUS
Status: DISCONTINUED | OUTPATIENT
Start: 2021-11-19 | End: 2021-11-19 | Stop reason: HOSPADM

## 2021-11-19 RX ORDER — SODIUM CHLORIDE 0.9 % (FLUSH) 0.9 %
5-40 SYRINGE (ML) INJECTION EVERY 8 HOURS
Status: DISCONTINUED | OUTPATIENT
Start: 2021-11-19 | End: 2021-11-19 | Stop reason: HOSPADM

## 2021-11-19 RX ORDER — ATROPINE SULFATE 0.1 MG/ML
0.5 INJECTION INTRAVENOUS
Status: DISCONTINUED | OUTPATIENT
Start: 2021-11-19 | End: 2021-11-19 | Stop reason: HOSPADM

## 2021-11-19 RX ORDER — MIDAZOLAM HYDROCHLORIDE 1 MG/ML
.25-5 INJECTION, SOLUTION INTRAMUSCULAR; INTRAVENOUS
Status: DISCONTINUED | OUTPATIENT
Start: 2021-11-19 | End: 2021-11-19 | Stop reason: HOSPADM

## 2021-11-19 RX ORDER — NALOXONE HYDROCHLORIDE 0.4 MG/ML
0.4 INJECTION, SOLUTION INTRAMUSCULAR; INTRAVENOUS; SUBCUTANEOUS
Status: DISCONTINUED | OUTPATIENT
Start: 2021-11-19 | End: 2021-11-19 | Stop reason: HOSPADM

## 2021-11-19 RX ORDER — DEXTROMETHORPHAN/PSEUDOEPHED 2.5-7.5/.8
1.2 DROPS ORAL
Status: DISCONTINUED | OUTPATIENT
Start: 2021-11-19 | End: 2021-11-19 | Stop reason: HOSPADM

## 2021-11-19 RX ORDER — LIDOCAINE HYDROCHLORIDE 20 MG/ML
INJECTION, SOLUTION EPIDURAL; INFILTRATION; INTRACAUDAL; PERINEURAL AS NEEDED
Status: DISCONTINUED | OUTPATIENT
Start: 2021-11-19 | End: 2021-11-19 | Stop reason: HOSPADM

## 2021-11-19 RX ORDER — PROPOFOL 10 MG/ML
INJECTION, EMULSION INTRAVENOUS AS NEEDED
Status: DISCONTINUED | OUTPATIENT
Start: 2021-11-19 | End: 2021-11-19 | Stop reason: HOSPADM

## 2021-11-19 RX ORDER — SODIUM CHLORIDE 0.9 % (FLUSH) 0.9 %
5-40 SYRINGE (ML) INJECTION AS NEEDED
Status: DISCONTINUED | OUTPATIENT
Start: 2021-11-19 | End: 2021-11-19 | Stop reason: HOSPADM

## 2021-11-19 RX ADMIN — PROPOFOL 10 MG: 10 INJECTION, EMULSION INTRAVENOUS at 08:40

## 2021-11-19 RX ADMIN — PROPOFOL 30 MG: 10 INJECTION, EMULSION INTRAVENOUS at 08:33

## 2021-11-19 RX ADMIN — LIDOCAINE HYDROCHLORIDE 50 MG: 20 INJECTION, SOLUTION EPIDURAL; INFILTRATION; INTRACAUDAL; PERINEURAL at 08:32

## 2021-11-19 RX ADMIN — PROPOFOL 20 MG: 10 INJECTION, EMULSION INTRAVENOUS at 08:35

## 2021-11-19 RX ADMIN — PROPOFOL 20 MG: 10 INJECTION, EMULSION INTRAVENOUS at 08:34

## 2021-11-19 RX ADMIN — PROPOFOL 50 MG: 10 INJECTION, EMULSION INTRAVENOUS at 08:32

## 2021-11-19 RX ADMIN — PROPOFOL 10 MG: 10 INJECTION, EMULSION INTRAVENOUS at 08:37

## 2021-11-19 RX ADMIN — PROPOFOL 10 MG: 10 INJECTION, EMULSION INTRAVENOUS at 08:38

## 2021-11-19 RX ADMIN — PROPOFOL 10 MG: 10 INJECTION, EMULSION INTRAVENOUS at 08:42

## 2021-11-19 RX ADMIN — PROPOFOL 10 MG: 10 INJECTION, EMULSION INTRAVENOUS at 08:36

## 2021-11-19 RX ADMIN — SODIUM CHLORIDE 100 ML/HR: 9 INJECTION, SOLUTION INTRAVENOUS at 08:12

## 2021-11-19 RX ADMIN — PROPOFOL 10 MG: 10 INJECTION, EMULSION INTRAVENOUS at 08:44

## 2021-11-19 RX ADMIN — PROPOFOL 10 MG: 10 INJECTION, EMULSION INTRAVENOUS at 08:46

## 2021-11-19 RX ADMIN — PROPOFOL 10 MG: 10 INJECTION, EMULSION INTRAVENOUS at 08:47

## 2021-11-19 NOTE — PROCEDURES
[facility]                  Colonoscopy Operative Report    11/19/2021      Ngozi Cox  [id number]  1950    Procedure Type:   Colonoscopy --screening     Indications:    Personal history of colon polyps (screening only)     Pre-operative Diagnosis: see indication above    Post-operative Diagnosis:  See findings below    :  Shar Miller MD    Referring Provider: Graciela Del Angel MD      Sedation:  MAC anesthesia    Pre-Procedural Exam:      Airway: clear,  No airway problems anticipated  Heart: RRR, without gallops or rubs  Lungs: clear bilaterally without wheezes, crackles, or rhonchi  Abdomen: soft, nontender, nondistended, bowel sounds present  Mental Status: awake, alert and oriented to person, place and time     Procedure Details:  After informed consent was obtained with all risks and benefits of procedure explained and preoperative exam completed, the patient was taken to the endoscopy suite and placed in the left lateral decubitus position. Upon sequential sedation as per above, a digital rectal exam was performed . The Olympus videocolonoscope  was inserted in the rectum and carefully advanced to the cecum, which was identified by the ileocecal valve and appendiceal orifice. The cecum was identified by the ileocecal valve and appendiceal orifice. The quality of preparation was good. The colonoscope was slowly withdrawn with careful evaluation between folds. Retroflexion in the rectum was completed demonstrating internal hemorrhoids. Findings:   Rectum: Grade 1 internal hemorrhoid(s); Sigmoid: mild diverticulosis  Descending Colon: normal  Transverse Colon: 8 mm sessile polyp, cold snared  Ascending Colon: medium sized lipoma  Cecum: normal  Terminal Ileum: not intubated      Specimen Removed:  transverse colonic polyp    Complications: None. EBL:  None.     Impression:    diverticulosis,  Mild in degree, involving the sigmoid  hemorrhoids internal, Moderate in size   Transverse colon polyp  Ascending colon lipoma    Recommendations: --Await pathology. , -Repeat colonoscopy in 5 years. High fiber diet. Resume normal medication(s). Discharge Disposition:  Home in the company of a  when able to ambulate. Regine Díaz MD    11/19/2021     CORWIN Meade MD  [facility], [address] [facility] [address] [place] [address]  [phone]  [email or web address]

## 2021-11-19 NOTE — ROUTINE PROCESS
[facility]  1950  [id number]    Situation:  Verbal report received from: Tati Lin  Procedure: Procedure(s):  COLONOSCOPY  ENDOSCOPIC POLYPECTOMY    Background:    Preoperative diagnosis: hx colon polyps  Postoperative diagnosis: Polyp, Diverticulosis, Hemorrhoids    :  Dr. Xander Kaba  Assistant(s): Endoscopy Technician-1: Nohemi Lara  Endoscopy RN-1: Zelda Loredo    Specimens:   ID Type Source Tests Collected by Time Destination   1 : polyp Preservative Colon, Abad Suyapa Tatum MD 11/19/2021 5585 Pathology     H. Pylori  no    Assessment:  Intra-procedure medications       Anesthesia gave intra-procedure sedation and medications, see anesthesia flow sheet yes    Intravenous fluids: NS@ KVO     Vital signs stable yes    Abdominal assessment: round and soft yes    Recommendation:

## 2021-11-19 NOTE — ANESTHESIA POSTPROCEDURE EVALUATION
Procedure(s):  COLONOSCOPY  ENDOSCOPIC POLYPECTOMY. MAC    Anesthesia Post Evaluation      Multimodal analgesia: multimodal analgesia used between 6 hours prior to anesthesia start to PACU discharge  Patient location during evaluation: PACU  Patient participation: complete - patient participated  Level of consciousness: sleepy but conscious and responsive to verbal stimuli  Pain score: 1  Pain management: adequate  Airway patency: patent  Anesthetic complications: no  Cardiovascular status: acceptable  Respiratory status: acceptable  Hydration status: acceptable  Comments: +Post-Anesthesia Evaluation and Assessment    Patient: Ngozi Cox MRN: [id number]  SSN: [id number]   YOB: 1950  Age: 80 y.o. Sex: female      Cardiovascular Function/Vital Signs    /70   Pulse 68   Temp 36.6 °C (97.8 °F)   Resp 16   Ht 5' 2\" (1.575 m)   Wt 68.5 kg (151 lb 1.6 oz)   SpO2 98%   Breastfeeding No   BMI 27.64 kg/m²     Patient is status post Procedure(s):  COLONOSCOPY  ENDOSCOPIC POLYPECTOMY. Nausea/Vomiting: Controlled. Postoperative hydration reviewed and adequate. Pain:  Pain Scale 1: Numeric (0 - 10) (11/19/21 0909)  Pain Intensity 1: 0 (11/19/21 0909)   Managed. Neurological Status: At baseline. Mental Status and Level of Consciousness: Arousable. Pulmonary Status:   O2 Device: None (Room air) (11/19/21 0909)   Adequate oxygenation and airway patent. Complications related to anesthesia: None    Post-anesthesia assessment completed. No concerns.     Signed By: Adrian Van MD    11/19/2021  Post anesthesia nausea and vomiting:  controlled  Final Post Anesthesia Temperature Assessment:  Normothermia (36.0-37.5 degrees C)      INITIAL Post-op Vital signs:   Vitals Value Taken Time   /70 11/19/21 0909   Temp 36.6 °C (97.8 °F) 11/19/21 0900   Pulse 69 11/19/21 0912   Resp 21 11/19/21 0912   SpO2 97 % 11/19/21 0912   Vitals shown include unvalidated device data.

## 2021-11-19 NOTE — DISCHARGE INSTRUCTIONS
Irena Perkins MD  [facility], [address]  [place], [address] [facility]  [phone]  [email or web address]  [id number]  1950    COLON DISCHARGE INSTRUCTIONS  Discomfort:  Redness at IV site- apply warm compress to area; if redness or soreness persist- contact your physician  There may be a slight amount of blood passed from the rectum  Gaseous discomfort- walking, belching will help relieve any discomfort  You may not operate a vehicle for 12 hours  You may not engage in an occupation involving machinery or appliances for rest of today  You may not drink alcoholic beverages for at least 12 hours  Avoid making any critical decisions for at least 24 hour  DIET:   High fiber diet. - however -  remember your colon is empty and a heavy meal will produce gas. Avoid these foods:  vegetables, fried / greasy foods, carbonated drinks for today      ACTIVITY:  You may resume your normal daily activities it is recommended that you spend the remainder of the day resting -  avoid any strenuous activity. CALL M.D. ANY SIGN OF:   Increasing pain, nausea, vomiting  Abdominal distension (swelling)  New increased bleeding (oral or rectal)  Fever (chills)  Pain in chest area  Bloody discharge from nose or mouth  Shortness of breath     COLONOSCOPY FINDINGS:  Your colonoscopy showed: one polyp which was removed. .    Follow-up Instructions:   Call Dr. Irena Perkins if any questions or problems. Telephone # [phone]  Dr. Kahlil Cruz office will notify you of the biopsy results within 7 to 10 days. Should have a repeat colonoscopy in 5 years.

## 2021-11-19 NOTE — PERIOP NOTES
Endoscope was pre-cleaned at bedside immediately following procedure by ELINOR Aceves       Medications     lidocaine (PF) 2% (mg)     Date/Time Rate/Dose/Volume Action Route Admin User Audit       11/19/21  0832 50 mg Given IntraVENous Saúl Ganser B, CRNA            propofol 10 mg/mL (mg)     Date/Time Rate/Dose/Volume Action Route Admin User Audit       11/19/21  0832 50 mg Given IntraVENous Jonathon Axon, CRNA       1171 30 mg Given IntraVENous Jonathon Axon, CRNA edited      5472 20 mg Given IntraVENous Jonathon Axon, CRNA edited      2465 20 mg Given IntraVENous Jonathon Axon, CRNA edited      2744 10 mg Given IntraVENous Jonathon Axon, CRNA       1178 10 mg Given IntraVENous Jonathon Axon, CRNA       2694 10 mg Given IntraVENous Jonathon Axon, CRNA       0840 10 mg Given IntraVENous Jonathon Axon, CRNA       1571 10 mg Given IntraVENous Jonathon Axon, CRNA       3168 10 mg Given IntraVENous Jonathon Axon, CRNA       5978 10 mg Given IntraVENous Jonathon Axon, CRNA       9850 10 mg Given IntraVENous Saúl Ganser B, CRNA            0.9% sodium chloride infusion (mL)     Date/Time Rate/Dose/Volume Action Route Admin User Audit       11/19/21  0829 100 mL/hr Rate Verify IntraVENous Jonathon Axon, CRNA       6377 350 mL Anesthesia Volume Adjustment IntraVENous Jonathon Axon, CRNA

## 2021-11-19 NOTE — ANESTHESIA PREPROCEDURE EVALUATION
Relevant Problems   RESPIRATORY SYSTEM   (+) Asthma      NEUROLOGY   (+) Depression, reactive      CARDIOVASCULAR   (+) HTN (hypertension)       Anesthetic History   No history of anesthetic complications            Review of Systems / Medical History  Patient summary reviewed, nursing notes reviewed and pertinent labs reviewed    Pulmonary            Asthma : well controlled       Neuro/Psych         Psychiatric history     Cardiovascular    Hypertension: well controlled              Exercise tolerance: >4 METS     GI/Hepatic/Renal  Within defined limits              Endo/Other  Within defined limits           Other Findings   Comments: HTN (hypertension)  Asthma           Physical Exam    Airway  Mallampati: II  TM Distance: 4 - 6 cm  Neck ROM: normal range of motion   Mouth opening: Normal     Cardiovascular  Regular rate and rhythm,  S1 and S2 normal,  no murmur, click, rub, or gallop  Rhythm: regular  Rate: normal         Dental  No notable dental hx       Pulmonary  Breath sounds clear to auscultation               Abdominal  GI exam deferred       Other Findings            Anesthetic Plan    ASA: 2  Anesthesia type: MAC          Induction: Intravenous  Anesthetic plan and risks discussed with: Patient

## 2021-11-19 NOTE — H&P
Irena Perkins MD  [facility], [address]  [phone]  [email or web address]    Gastroenterology Outpatient History and Physical    Patient:  Nurse    Physician: Nikki Clark MD    Vital Signs: Blood pressure 121/74, pulse 65, temperature 98 °F (36.7 °C), resp. rate 14, height 5' 2\" (1.575 m), weight 68.5 kg (151 lb 1.6 oz), SpO2 98 %, not currently breastfeeding. Allergies: Allergies   Allergen Reactions    Acetylcarnitine Swelling    Ace Inhibitors Cough    Aleve [Naproxen Sodium] Hives    Daypro [Oxaprozin] Rash    Effexor [Venlafaxine] Other (comments)     Abdominal pain    Prednisone Swelling     Face swelling ONLY with large dosage    Shellfish Containing Products Swelling     Throat and tounge       Chief Complaint: Hx of polyps    History of Present Illness: Personal history of colonic polyps. Last colonoscopy was 2016 and showed 14 mm polyp in ascending colon which was removed. Currently has no GI symptoms. No FH of colon cancer or polyps. History:  Past Medical History:   Diagnosis Date    Ankle sprain 03/2014    right    Asthma 12/14/2009    HTN (hypertension) 12/14/2009    Iron deficiency anemia 12/14/2009      Past Surgical History:   Procedure Laterality Date    HX ORTHOPAEDIC      back    HX ORTHOPAEDIC Right     thumb surgery      Social History     Socioeconomic History    Marital status:    Tobacco Use    Smoking status: Never Smoker    Smokeless tobacco: Never Used   Substance and Sexual Activity    Alcohol use: Yes     Alcohol/week: 0.8 standard drinks     Types: 1 Glasses of wine per week     Comment: occasionally    Drug use: No    Sexual activity: Yes     Partners: Male    History reviewed. No pertinent family history.    Patient Active Problem List   Diagnosis Code    HTN (hypertension) I10    Asthma J45.909    Allergic rhinitis J30.9  Depression, reactive F32.9    Cyst of tendon sheath M67.80       Medications:   Prior to Admission medications    Medication Sig Start Date End Date Taking? Authorizing Provider   montelukast (SINGULAIR) 10 mg tablet TAKE 1 TABLET BY MOUTH EVERY DAY 9/3/21  Yes Lev Naik MD   losartan-hydroCHLOROthiazide [place]) 100-25 mg per tablet TAKE 1 TABLET BY MOUTH EVERY DAY 9/3/21  Yes Lev Naik MD   amLODIPine (NORVASC) 2.5 mg tablet TAKE 1 TABLET BY MOUTH EVERY DAY 6/3/21  Yes Lev Naik MD   albuterol (PROVENTIL VENTOLIN) 2.5 mg /3 mL (0.083 %) nebu 3 mL by Nebulization route every four (4) hours as needed for Wheezing. 12/7/20  Yes Lev Naik MD   fluticasone propionate (FLONASE NA) by Nasal route.    Yes Provider, Historical   albuterol (PROVENTIL HFA, VENTOLIN HFA, PROAIR HFA) 90 mcg/actuation inhaler INHALE 2 PUFFS BY MOUTH EVERY 4 HOURS AS NEEDED FOR WHEEZING  Patient not taking: Reported on 11/19/2021 8/26/21   Lev Naik MD       Physical Exam:     General: well developed, well nourished   HEENT: unremarkable   Heart: regular rhythm no mumur    Lungs: clear   Abdominal:  benign   Neurological: unremarkable   Extremities: no edema     Findings/Diagnosis: Personal history of colonic polyps    Plan of Care/Planned Procedure: Colonoscopy with monitored anesthesia care sedation    Signed:  Yvon Kumari MD 11/19/2021

## 2021-12-05 RX ORDER — LOSARTAN POTASSIUM AND HYDROCHLOROTHIAZIDE 25; 100 MG/1; MG/1
TABLET ORAL
Qty: 90 TABLET | Refills: 0 | Status: SHIPPED | OUTPATIENT
Start: 2021-12-05 | End: 2022-03-10

## 2021-12-05 RX ORDER — MONTELUKAST SODIUM 10 MG/1
TABLET ORAL
Qty: 90 TABLET | Refills: 0 | Status: SHIPPED | OUTPATIENT
Start: 2021-12-05 | End: 2022-03-03

## 2021-12-13 NOTE — PROGRESS NOTES
HISTORY OF PRESENT ILLNESS  Sandra Jimenez is a 80 y.o. female. HPI     Last here 6/15/21. Pt is here to f/u on chronic conditions.     BP today is 138/84  BP at home 135/80s  Continues on losartan-HCTZ 100-25mg and norvasc 2.5mg daily     Wt today is 154 lbs, up 2 lbs x lov   Discussed continued diet and w/l      Reviewed labs  Worsened anemia on previous labs, will recheck this  Will get labs today     Pt now follows with Dr. Bhavin Cruz (derm)   Last visit was 4/19   Discussed f/u     Pt saw Dr Lucretia Billy ([facility]) for recurrent UTI sx  Pt has not had any UTIs since then   No longer taking imvexxy        She saw Dr. Ese Padron (pulm) for cough   Last there 7/21  Continues singulair daily and flovent once daily   She is now using arnuity instead of flovent BID with worsening sxs   Also uses allegra  She also has albuterol to use prn for breathing - not needed recently   Recall advair caused a rash      She had thumb surgery with Dr. Tara Conner (ortho) for de quervain's tenosynovitis    Reviewed colo 11/19/21:  diverticulosis,  Mild in degree, involving the sigmoid  hemorrhoids internal, Moderate in size   Transverse colon polyp  Ascending colon lipoma    Pt lives with her     Pt is functionally independent   No problems hearing      No memory concerns   Knows the month, date, year, location   Can recall 3/3 objects        Recall  ECHO 2/19: EF 56-60%, mild dilation of L atrial cavity     ACP not on file.  SDM is her .   Pt has this information at home and will bring it in.      PREVENTIVE:    Colonoscopy: 11/19/21 Dr. Eric Jara 5 year repeat  Pap: [address] 9/30/21  Mammogram: 9/30/21 [address] nl  DEXA: 9/30/21 osteopenia [address]  AAA: no FMHX  Tdap: 5/13/2013  Pneumovax: 10/23/17  [id number]: 10/18/2016  Zostavax: 9/25/2015  Shingrix: completed both in 2019  Flu shot: 10/21  A1c: 4/16 6.1, 10/16 6.2, 10/17 5.9, 4/18 5.6, 10/18 5.9 5/19 5.9 11/19 5.8 6/21 5.8  Eye exam: Dr. Heaven Farah   EKG: 10/23/17, nsr  AAA: 5/19, negative with CT abd   moderna x 3    Patient Active Problem List    Diagnosis Date Noted    Cyst of tendon sheath 04/09/2012    Depression, reactive 06/27/2011    HTN (hypertension) 12/14/2009    Asthma 12/14/2009    Allergic rhinitis 12/14/2009     Current Outpatient Medications   Medication Sig Dispense Refill    losartan-hydroCHLOROthiazide (HYZAAR) 100-25 mg per tablet TAKE 1 TABLET BY MOUTH EVERY DAY 90 Tablet 0    montelukast (SINGULAIR) 10 mg tablet TAKE 1 TABLET BY MOUTH EVERY DAY 90 Tablet 0    albuterol (PROVENTIL HFA, VENTOLIN HFA, PROAIR HFA) 90 mcg/actuation inhaler INHALE 2 PUFFS BY MOUTH EVERY 4 HOURS AS NEEDED FOR WHEEZING 18 g 0    amLODIPine (NORVASC) 2.5 mg tablet TAKE 1 TABLET BY MOUTH EVERY DAY 90 Tablet 1    fluticasone propionate (FLONASE NA) by Nasal route.  Arnuity Ellipta 100 mcg/actuation dsdv inhaler INHALE 1 PUFF BY MOUTH DAILY      albuterol (PROVENTIL VENTOLIN) 2.5 mg /3 mL (0.083 %) nebu 3 mL by Nebulization route every four (4) hours as needed for Wheezing.  (Patient not taking: Reported on 12/14/2021) 100 Nebule 1     Past Surgical History:   Procedure Laterality Date    COLONOSCOPY N/A 11/19/2021    COLONOSCOPY performed by Tania Baltazar MD at [facility]; HI RISK IND  11/19/2021         HX ORTHOPAEDIC      back    HX ORTHOPAEDIC Right     thumb surgery      Lab Results   Component Value Date/Time    WBC 7.5 06/15/2021 09:11 AM    HGB 9.8 (L) 06/15/2021 09:11 AM    HCT 31.2 (L) 06/15/2021 09:11 AM    PLATELET 828 74/77/8900 09:11 AM    MCV 94.3 06/15/2021 09:11 AM     Lab Results   Component Value Date/Time    Cholesterol, total 204 (H) 06/15/2021 09:11 AM    HDL Cholesterol 60 06/15/2021 09:11 AM    LDL, calculated 127 (H) 06/15/2021 09:11 AM    Triglyceride 85 06/15/2021 09:11 AM    CHOL/HDL Ratio 3.4 06/15/2021 09:11 AM     Lab Results   Component Value Date/Time    GFR est non-AA 41 (L) 06/15/2021 09:11 AM GFRNA, POC 55 (L) 05/06/2019 01:14 PM    GFR est AA 50 (L) 06/15/2021 09:11 AM    GFRAA, POC >60 05/06/2019 01:14 PM    Creatinine 1.28 (H) 06/15/2021 09:11 AM    Creatinine (POC) 1.0 05/06/2019 01:14 PM    BUN 29 (H) 06/15/2021 09:11 AM    Sodium 142 06/15/2021 09:11 AM    Potassium 3.7 06/15/2021 09:11 AM    Chloride 108 06/15/2021 09:11 AM    CO2 28 06/15/2021 09:11 AM        Review of Systems   Respiratory: Negative for shortness of breath. Cardiovascular: Negative for chest pain. Physical Exam  Constitutional:       General: She is not in acute distress. Appearance: Normal appearance. She is not ill-appearing, toxic-appearing or diaphoretic. HENT:      Head: Normocephalic and atraumatic. Right Ear: External ear normal.      Left Ear: External ear normal.   Eyes:      General:         Right eye: No discharge. Left eye: No discharge. Conjunctiva/sclera: Conjunctivae normal.      Pupils: Pupils are equal, round, and reactive to light. Cardiovascular:      Rate and Rhythm: Normal rate and regular rhythm. Heart sounds: Murmur heard. No friction rub. No gallop. Pulmonary:      Effort: No respiratory distress. Breath sounds: Normal breath sounds. No wheezing or rales. Chest:      Chest wall: No tenderness. Musculoskeletal:         General: Normal range of motion. Cervical back: Normal range of motion and neck supple. Right lower leg: No edema. Left lower leg: No edema. Skin:     General: Skin is warm and dry. Neurological:      Mental Status: She is alert and oriented to person, place, and time. Mental status is at baseline. Coordination: Coordination normal.      Gait: Gait normal.   Psychiatric:         Mood and Affect: Mood normal.         Behavior: Behavior normal.         ASSESSMENT and PLAN    ICD-10-CM ICD-9-CM    1. Medicare annual wellness visit, subsequent      Z00.00 V70.0    2.  Primary hypertension       Controlled on losartan hydrochlorothiazide Norvasc continue     I10 401.9    3. Depression, reactive     Controlled without medication doing well     F32.9 300.4    4. Moderate persistent asthma without complication       Now on Arnuity inhaler and Singulair doing well will get pulmonary notes for review     J45.40 493.90    5. IFG (impaired fasting glucose)     Check A1c R73.01 790.21    Dyslipidemia diet controlled    Scribed by Estella Oneill [facility], as dictated by Dr. Cristian Carson. Current diagnosis and concerns discussed with pt at length. Pt understands risks and benefits or current treatment plan and medications, and accepts the treatment and medication with any possible risks. Pt asks appropriate questions, which were answered. Pt was instructed to call with any concerns or problems. I have reviewed the note documented by the scribe. The services provided are my own.   The documentation is accurate

## 2021-12-14 ENCOUNTER — OFFICE VISIT (OUTPATIENT)
Dept: INTERNAL MEDICINE CLINIC | Age: 71
End: 2021-12-14
Payer: MEDICARE

## 2021-12-14 VITALS
BODY MASS INDEX: 27.36 KG/M2 | HEART RATE: 70 BPM | RESPIRATION RATE: 16 BRPM | HEIGHT: 63 IN | SYSTOLIC BLOOD PRESSURE: 138 MMHG | OXYGEN SATURATION: 98 % | DIASTOLIC BLOOD PRESSURE: 84 MMHG | WEIGHT: 154.4 LBS | TEMPERATURE: 97.7 F

## 2021-12-14 DIAGNOSIS — R01.1 CARDIAC MURMUR: ICD-10-CM

## 2021-12-14 DIAGNOSIS — I10 PRIMARY HYPERTENSION: Primary | ICD-10-CM

## 2021-12-14 DIAGNOSIS — F32.9 DEPRESSION, REACTIVE: ICD-10-CM

## 2021-12-14 DIAGNOSIS — Z00.00 MEDICARE ANNUAL WELLNESS VISIT, SUBSEQUENT: ICD-10-CM

## 2021-12-14 DIAGNOSIS — J45.40 MODERATE PERSISTENT ASTHMA WITHOUT COMPLICATION: ICD-10-CM

## 2021-12-14 DIAGNOSIS — R73.01 IFG (IMPAIRED FASTING GLUCOSE): ICD-10-CM

## 2021-12-14 DIAGNOSIS — D64.9 ANEMIA, UNSPECIFIED TYPE: ICD-10-CM

## 2021-12-14 PROCEDURE — G8427 DOCREV CUR MEDS BY ELIG CLIN: HCPCS | Performed by: INTERNAL MEDICINE

## 2021-12-14 PROCEDURE — G9717 DOC PT DX DEP/BP F/U NT REQ: HCPCS | Performed by: INTERNAL MEDICINE

## 2021-12-14 PROCEDURE — G8419 CALC BMI OUT NRM PARAM NOF/U: HCPCS | Performed by: INTERNAL MEDICINE

## 2021-12-14 PROCEDURE — 1101F PT FALLS ASSESS-DOCD LE1/YR: CPT | Performed by: INTERNAL MEDICINE

## 2021-12-14 PROCEDURE — G0439 PPPS, SUBSEQ VISIT: HCPCS | Performed by: INTERNAL MEDICINE

## 2021-12-14 PROCEDURE — G9899 SCRN MAM PERF RSLTS DOC: HCPCS | Performed by: INTERNAL MEDICINE

## 2021-12-14 PROCEDURE — 1090F PRES/ABSN URINE INCON ASSESS: CPT | Performed by: INTERNAL MEDICINE

## 2021-12-14 PROCEDURE — G8399 PT W/DXA RESULTS DOCUMENT: HCPCS | Performed by: INTERNAL MEDICINE

## 2021-12-14 PROCEDURE — G8754 DIAS BP LESS 90: HCPCS | Performed by: INTERNAL MEDICINE

## 2021-12-14 PROCEDURE — 3017F COLORECTAL CA SCREEN DOC REV: CPT | Performed by: INTERNAL MEDICINE

## 2021-12-14 PROCEDURE — G8752 SYS BP LESS 140: HCPCS | Performed by: INTERNAL MEDICINE

## 2021-12-14 PROCEDURE — 99214 OFFICE O/P EST MOD 30 MIN: CPT | Performed by: INTERNAL MEDICINE

## 2021-12-14 PROCEDURE — G0463 HOSPITAL OUTPT CLINIC VISIT: HCPCS | Performed by: INTERNAL MEDICINE

## 2021-12-14 PROCEDURE — G8536 NO DOC ELDER MAL SCRN: HCPCS | Performed by: INTERNAL MEDICINE

## 2021-12-14 RX ORDER — FLUTICASONE FUROATE 100 UG/1
POWDER RESPIRATORY (INHALATION)
COMMUNITY
Start: 2021-11-29 | End: 2022-06-14 | Stop reason: ALTCHOICE

## 2021-12-14 NOTE — PATIENT INSTRUCTIONS

## 2021-12-14 NOTE — PROGRESS NOTES
This is the Subsequent Medicare Annual Wellness Exam, performed 12 months or more after the Initial AWV or the last Subsequent AWV    I have reviewed the patient's medical history in detail and updated the computerized patient record. Assessment/Plan   Education and counseling provided:  Are appropriate based on today's review and evaluation    1. Medicare annual wellness visit, subsequent       Depression Risk Factor Screening     3 most recent PHQ Screens 12/14/2021   Little interest or pleasure in doing things Not at all   Feeling down, depressed, irritable, or hopeless Not at all   Total Score PHQ 2 0       Alcohol Risk Screen    Do you average more than 1 drink per night or more than 7 drinks a week:  No    On any one occasion in the past three months have you have had more than 3 drinks containing alcohol:  No        Functional Ability and Level of Safety    Hearing: Hearing is good. Activities of Daily Living: The home contains: no safety equipment. Patient does total self care      Ambulation: with no difficulty     Fall Risk:  Fall Risk Assessment, last 12 mths 12/14/2021   Able to walk? Yes   Fall in past 12 months?  0      Abuse Screen:  Patient is not abused   lives w    safe    Cognitive Screening    Has your family/caregiver stated any concerns about your memory: no     Cognitive Screening: Normal - Mini Cog Test      No memory concerns   Pt knows the month, day and year   Can recall 3/3 objects   Health Maintenance Due     Health Maintenance Due   Topic Date Due    Breast Cancer Screen Mammogram  01/28/2021       Patient Care Team   Patient Care Team:  Horacio Zhang MD as PCP - General (Internal Medicine)  Horacio Zhang MD as PCP - [facility] Empaneled Provider  Dahiana Partida MD (Ophthalmology)  Ahsan Farias MD (Gastroenterology)  Ruiz Evans MD (Obstetrics & Gynecology)  Zaid Zaragoza MD (Orthopedic Surgery)  Beny Red MD (Dermatology)  Tal Gordon MD (Female Pelvic Medicine and Reconstructive Surgery)  Nirmala Ricks MD (Internal Medicine)  Keith Hudson MD (Orthopedic Surgery)     updated    History     Patient Active Problem List   Diagnosis Code    HTN (hypertension) I10    Asthma J45.909    Allergic rhinitis J30.9    Depression, reactive F32.9    Cyst of tendon sheath M67.80     Past Medical History:   Diagnosis Date    Ankle sprain 2014    right    Asthma 2009    HTN (hypertension) 2009    Iron deficiency anemia 2009      Past Surgical History:   Procedure Laterality Date    COLONOSCOPY N/A 2021    COLONOSCOPY performed by Chilo Gomez MD at [address]; HI RISK IND  2021         HX ORTHOPAEDIC      back    HX ORTHOPAEDIC Right     thumb surgery     Current Outpatient Medications   Medication Sig Dispense Refill    losartan-hydroCHLOROthiazide (HYZAAR) 100-25 mg per tablet TAKE 1 TABLET BY MOUTH EVERY DAY 90 Tablet 0    montelukast (SINGULAIR) 10 mg tablet TAKE 1 TABLET BY MOUTH EVERY DAY 90 Tablet 0    albuterol (PROVENTIL HFA, VENTOLIN HFA, PROAIR HFA) 90 mcg/actuation inhaler INHALE 2 PUFFS BY MOUTH EVERY 4 HOURS AS NEEDED FOR WHEEZING 18 g 0    amLODIPine (NORVASC) 2.5 mg tablet TAKE 1 TABLET BY MOUTH EVERY DAY 90 Tablet 1    fluticasone propionate (FLONASE NA) by Nasal route.  albuterol (PROVENTIL VENTOLIN) 2.5 mg /3 mL (0.083 %) nebu 3 mL by Nebulization route every four (4) hours as needed for Wheezing.  (Patient not taking: Reported on 2021) 100 Nebule 1     Allergies   Allergen Reactions    Acetylcarnitine Swelling    Ace Inhibitors Cough    Aleve [Naproxen Sodium] Hives    Daypro [Oxaprozin] Rash    Effexor [Venlafaxine] Other (comments)     Abdominal pain    Prednisone Swelling     Face swelling ONLY with large dosage    Shellfish Containing Products Swelling     Throat and tounge       No family history on file. Social History     Tobacco Use    Smoking status: Never Smoker    Smokeless tobacco: Never Used   Substance Use Topics    Alcohol use: Yes     Alcohol/week: 0.8 standard drinks     Types: 1 Glasses of wine per week     Comment: occasionally     ACP not on file.  SDM is her . Pt has this information at home and will bring it in.         Colonoscopy: 11/19/21 Dr. Dejan Paniagua 5 year repeat  [address]ap: [address] 9/30/21 q 2 years   Mammogram: 9/30/21 [address] nl annual   DEXA: 9/30/21 osteopenia [address]  AAA: no FMHX      Tdap: 5/13/2013  Pneumovax: 10/23/17  [id number]: 10/18/2016  Zostavax: 9/25/2015  Shingrix: completed both in 2019  Flu shot: 10/21      A1c: 6/21 5.8 due now    Eye exam: Dr. Antoinette Camacho annual     Lipids: 6/21  annual   EKG: 10/23/17, nsr  AAA: 5/19, negative with CT abd   moderna x 3    Medication reconciliation completed by MA and reviewed by me. Medical/surgical/social/family history reviewed and updated by me. Patient provided AVS and preventative screening table. Patient verbalized understanding of all information discussed.       Daxa Guerrero MD

## 2021-12-19 DIAGNOSIS — J45.40 MODERATE PERSISTENT ASTHMA WITHOUT COMPLICATION: ICD-10-CM

## 2021-12-20 RX ORDER — ALBUTEROL SULFATE 90 UG/1
AEROSOL, METERED RESPIRATORY (INHALATION)
Qty: 18 G | Refills: 0 | Status: SHIPPED | OUTPATIENT
Start: 2021-12-20 | End: 2022-01-12

## 2021-12-20 RX ORDER — AMLODIPINE BESYLATE 2.5 MG/1
TABLET ORAL
Qty: 90 TABLET | Refills: 1 | Status: SHIPPED | OUTPATIENT
Start: 2021-12-20 | End: 2022-03-18

## 2022-01-11 DIAGNOSIS — J45.40 MODERATE PERSISTENT ASTHMA WITHOUT COMPLICATION: ICD-10-CM

## 2022-01-12 RX ORDER — ALBUTEROL SULFATE 90 UG/1
AEROSOL, METERED RESPIRATORY (INHALATION)
Qty: 18 G | Refills: 0 | Status: SHIPPED | OUTPATIENT
Start: 2022-01-12 | End: 2022-03-21

## 2022-03-03 RX ORDER — MONTELUKAST SODIUM 10 MG/1
TABLET ORAL
Qty: 90 TABLET | Refills: 0 | Status: SHIPPED | OUTPATIENT
Start: 2022-03-03 | End: 2022-03-05

## 2022-03-05 RX ORDER — MONTELUKAST SODIUM 10 MG/1
TABLET ORAL
Qty: 90 TABLET | Refills: 0 | Status: SHIPPED | OUTPATIENT
Start: 2022-03-05 | End: 2022-09-27

## 2022-03-10 RX ORDER — LOSARTAN POTASSIUM AND HYDROCHLOROTHIAZIDE 25; 100 MG/1; MG/1
TABLET ORAL
Qty: 90 TABLET | Refills: 0 | Status: SHIPPED | OUTPATIENT
Start: 2022-03-10 | End: 2022-06-14

## 2022-03-18 RX ORDER — AMLODIPINE BESYLATE 2.5 MG/1
TABLET ORAL
Qty: 90 TABLET | Refills: 1 | Status: SHIPPED | OUTPATIENT
Start: 2022-03-18

## 2022-03-20 DIAGNOSIS — J45.40 MODERATE PERSISTENT ASTHMA WITHOUT COMPLICATION: ICD-10-CM

## 2022-03-21 RX ORDER — ALBUTEROL SULFATE 90 UG/1
AEROSOL, METERED RESPIRATORY (INHALATION)
Qty: 18 G | Refills: 0 | Status: SHIPPED | OUTPATIENT
Start: 2022-03-21 | End: 2022-07-01

## 2022-05-02 ENCOUNTER — TRANSCRIBE ORDER (OUTPATIENT)
Dept: REGISTRATION | Age: 72
End: 2022-05-02

## 2022-05-02 ENCOUNTER — HOSPITAL ENCOUNTER (OUTPATIENT)
Dept: GENERAL RADIOLOGY | Age: 72
Discharge: HOME | End: 2022-05-02
Payer: MEDICARE

## 2022-05-02 DIAGNOSIS — R05.3 CHRONIC COUGH: Primary | ICD-10-CM

## 2022-05-02 DIAGNOSIS — R05.3 CHRONIC COUGH: ICD-10-CM

## 2022-05-02 PROCEDURE — 71046 X-RAY EXAM CHEST 2 VIEWS: CPT

## 2022-06-14 ENCOUNTER — OFFICE VISIT (OUTPATIENT)
Dept: INTERNAL MEDICINE CLINIC | Age: 72
End: 2022-06-14
Payer: MEDICARE

## 2022-06-14 VITALS
OXYGEN SATURATION: 99 % | WEIGHT: 158 LBS | SYSTOLIC BLOOD PRESSURE: 136 MMHG | HEART RATE: 54 BPM | DIASTOLIC BLOOD PRESSURE: 67 MMHG | HEIGHT: 63 IN | TEMPERATURE: 97.7 F | RESPIRATION RATE: 16 BRPM | BODY MASS INDEX: 28 KG/M2

## 2022-06-14 DIAGNOSIS — R73.01 IFG (IMPAIRED FASTING GLUCOSE): ICD-10-CM

## 2022-06-14 DIAGNOSIS — D64.9 ANEMIA, UNSPECIFIED TYPE: ICD-10-CM

## 2022-06-14 DIAGNOSIS — J45.40 MODERATE PERSISTENT ASTHMA WITHOUT COMPLICATION: ICD-10-CM

## 2022-06-14 DIAGNOSIS — E78.2 MIXED HYPERLIPIDEMIA: ICD-10-CM

## 2022-06-14 DIAGNOSIS — I10 PRIMARY HYPERTENSION: Primary | ICD-10-CM

## 2022-06-14 LAB
ALBUMIN SERPL-MCNC: 3.8 G/DL (ref 3.5–5)
ALBUMIN/GLOB SERPL: 1.1 {RATIO} (ref 1.1–2.2)
ALP SERPL-CCNC: 96 U/L (ref 45–117)
ALT SERPL-CCNC: 27 U/L (ref 12–78)
ANION GAP SERPL CALC-SCNC: 2 MMOL/L (ref 5–15)
AST SERPL-CCNC: 20 U/L (ref 15–37)
BILIRUB SERPL-MCNC: 0.3 MG/DL (ref 0.2–1)
BUN SERPL-MCNC: 29 MG/DL (ref 6–20)
BUN/CREAT SERPL: 24 (ref 12–20)
CALCIUM SERPL-MCNC: 9 MG/DL (ref 8.5–10.1)
CHLORIDE SERPL-SCNC: 108 MMOL/L (ref 97–108)
CHOLEST SERPL-MCNC: 194 MG/DL
CO2 SERPL-SCNC: 30 MMOL/L (ref 21–32)
COMMENT, HOLDF: NORMAL
CREAT SERPL-MCNC: 1.21 MG/DL (ref 0.55–1.02)
ERYTHROCYTE [DISTWIDTH] IN BLOOD BY AUTOMATED COUNT: 13.1 % (ref 11.5–14.5)
EST. AVERAGE GLUCOSE BLD GHB EST-MCNC: 120 MG/DL
FERRITIN SERPL-MCNC: 46 NG/ML (ref 26–388)
GLOBULIN SER CALC-MCNC: 3.6 G/DL (ref 2–4)
GLUCOSE SERPL-MCNC: 94 MG/DL (ref 65–100)
HBA1C MFR BLD: 5.8 % (ref 4–5.6)
HCT VFR BLD AUTO: 34.2 % (ref 35–47)
HDLC SERPL-MCNC: 62 MG/DL
HDLC SERPL: 3.1 {RATIO} (ref 0–5)
HGB BLD-MCNC: 10.4 G/DL (ref 11.5–16)
IRON SATN MFR SERPL: 14 % (ref 20–50)
IRON SERPL-MCNC: 50 UG/DL (ref 35–150)
LDLC SERPL CALC-MCNC: 115.8 MG/DL (ref 0–100)
MCH RBC QN AUTO: 29.8 PG (ref 26–34)
MCHC RBC AUTO-ENTMCNC: 30.4 G/DL (ref 30–36.5)
MCV RBC AUTO: 98 FL (ref 80–99)
NRBC # BLD: 0 K/UL (ref 0–0.01)
NRBC BLD-RTO: 0 PER 100 WBC
PLATELET # BLD AUTO: 301 K/UL (ref 150–400)
PMV BLD AUTO: 9.6 FL (ref 8.9–12.9)
POTASSIUM SERPL-SCNC: 3.8 MMOL/L (ref 3.5–5.1)
PROT SERPL-MCNC: 7.4 G/DL (ref 6.4–8.2)
RBC # BLD AUTO: 3.49 M/UL (ref 3.8–5.2)
SAMPLES BEING HELD,HOLD: NORMAL
SODIUM SERPL-SCNC: 140 MMOL/L (ref 136–145)
TIBC SERPL-MCNC: 348 UG/DL (ref 250–450)
TRIGL SERPL-MCNC: 81 MG/DL (ref ?–150)
TSH SERPL DL<=0.05 MIU/L-ACNC: 1.53 UIU/ML (ref 0.36–3.74)
VLDLC SERPL CALC-MCNC: 16.2 MG/DL
WBC # BLD AUTO: 7.3 K/UL (ref 3.6–11)

## 2022-06-14 PROCEDURE — 1090F PRES/ABSN URINE INCON ASSESS: CPT | Performed by: INTERNAL MEDICINE

## 2022-06-14 PROCEDURE — G0463 HOSPITAL OUTPT CLINIC VISIT: HCPCS | Performed by: INTERNAL MEDICINE

## 2022-06-14 PROCEDURE — 3017F COLORECTAL CA SCREEN DOC REV: CPT | Performed by: INTERNAL MEDICINE

## 2022-06-14 PROCEDURE — G9899 SCRN MAM PERF RSLTS DOC: HCPCS | Performed by: INTERNAL MEDICINE

## 2022-06-14 PROCEDURE — G8427 DOCREV CUR MEDS BY ELIG CLIN: HCPCS | Performed by: INTERNAL MEDICINE

## 2022-06-14 PROCEDURE — 99214 OFFICE O/P EST MOD 30 MIN: CPT | Performed by: INTERNAL MEDICINE

## 2022-06-14 PROCEDURE — G8417 CALC BMI ABV UP PARAM F/U: HCPCS | Performed by: INTERNAL MEDICINE

## 2022-06-14 PROCEDURE — G8399 PT W/DXA RESULTS DOCUMENT: HCPCS | Performed by: INTERNAL MEDICINE

## 2022-06-14 PROCEDURE — G8752 SYS BP LESS 140: HCPCS | Performed by: INTERNAL MEDICINE

## 2022-06-14 PROCEDURE — G8536 NO DOC ELDER MAL SCRN: HCPCS | Performed by: INTERNAL MEDICINE

## 2022-06-14 PROCEDURE — 1101F PT FALLS ASSESS-DOCD LE1/YR: CPT | Performed by: INTERNAL MEDICINE

## 2022-06-14 PROCEDURE — G9717 DOC PT DX DEP/BP F/U NT REQ: HCPCS | Performed by: INTERNAL MEDICINE

## 2022-06-14 PROCEDURE — G8754 DIAS BP LESS 90: HCPCS | Performed by: INTERNAL MEDICINE

## 2022-06-14 RX ORDER — LOSARTAN POTASSIUM AND HYDROCHLOROTHIAZIDE 25; 100 MG/1; MG/1
TABLET ORAL
Qty: 90 TABLET | Refills: 0 | Status: SHIPPED | OUTPATIENT
Start: 2022-06-14 | End: 2022-06-21

## 2022-06-14 RX ORDER — FLUTICASONE FUROATE AND VILANTEROL TRIFENATATE 100; 25 UG/1; UG/1
POWDER RESPIRATORY (INHALATION)
COMMUNITY
Start: 2022-05-05

## 2022-06-14 NOTE — LETTER
6/16/2022 10:38 AM    [facility] [address]    Dear Care Provider    Please fax us the most recent mammogram so that we may update the patient's records for continuity of care. Our fax number: [phone].     Patient:   Masha Santana  1950      Sincerely,      Amarilis Chino MD

## 2022-06-14 NOTE — PROGRESS NOTES
Identified pt with two pt identifiers(name and ). Reviewed record in preparation for visit and have obtained necessary documentation. Chief Complaint   Patient presents with    Hypertension        Visit Vitals  /67 (BP 1 Location: Left upper arm, BP Patient Position: Sitting, BP Cuff Size: Adult)   Pulse (!) 54   Temp 97.7 °F (36.5 °C) (Temporal)   Resp 16   Ht 5' 2.5\" (1.588 m)   Wt 158 lb (71.7 kg)   SpO2 99%   BMI 28.44 kg/m²       Health Maintenance Due   Topic    Breast Cancer Screen Mammogram     A1C test (Diabetic or Prediabetic)         1. \"Have you been to the ER, urgent care clinic since your last visit? Hospitalized since your last visit? \" No    2. \"Have you seen or consulted any other health care providers outside of the [address] [place] [address] since your last visit? \" No     3. For patients aged 39-70: Has the patient had a colonoscopy / FIT/ Cologuard? Yes - no Care Gap present      If the patient is female:    4. For patients aged 41-77: Has the patient had a mammogram within the past 2 years? No      5. For patients aged 21-65: Has the patient had a pap smear? NA - based on age or sex     Med Reconciliation: Completed        Patient is accompanied by self I have received verbal consent from Sylvia Palomares to discuss any/all medical information while they are present in the room.

## 2022-06-15 DIAGNOSIS — D64.9 ANEMIA, UNSPECIFIED TYPE: Primary | ICD-10-CM

## 2022-06-15 NOTE — PROGRESS NOTES
Called, spoke to pt  Received two pt identifiers   Pt informed per Dr. Jorge Frias persistent anemia even though iron is normal  Pt informed per Dr. Jorge Frias see hematology for further eval.--referral placed to Dr. Lennie Reece labs are okay   Pt verbalizes understanding of the instructions and has no further questions at this time.

## 2022-06-15 NOTE — PROGRESS NOTES
Please call patient back about results  Persistent anemia, normal iron--have her see hematology for further eval   Rest stable normal

## 2022-06-16 ENCOUNTER — TELEPHONE (OUTPATIENT)
Dept: INTERNAL MEDICINE CLINIC | Age: 72
End: 2022-06-16

## 2022-06-16 NOTE — TELEPHONE ENCOUNTER
Received referral to hematology Oncology. Called 's office and they requested I fax records and they will contact patient after reviewing records.   Faxed on 6/16  Left message notifying patient

## 2022-06-20 ENCOUNTER — TELEPHONE (OUTPATIENT)
Dept: INTERNAL MEDICINE CLINIC | Age: 72
End: 2022-06-20

## 2022-06-20 NOTE — TELEPHONE ENCOUNTER
----- Message from Marlo Fox sent at 6/20/2022 12:32 PM EDT -----  Subject: Message to Provider    QUESTIONS  Information for Provider? pt need medical records sent over to the   [facility] , so pt can eta appt- pt spoke with nurse and office have   yet to receive the forms. ---------------------------------------------------------------------------  --------------  Ria ZIMMERMAN  What is the best way for the office to contact you? OK to leave message on   voicemail  Preferred Call Back Phone Number? [phone]  ---------------------------------------------------------------------------  --------------  SCRIPT ANSWERS  Relationship to Patient?  Self

## 2022-06-21 RX ORDER — LOSARTAN POTASSIUM AND HYDROCHLOROTHIAZIDE 25; 100 MG/1; MG/1
TABLET ORAL
Qty: 90 TABLET | Refills: 0 | Status: SHIPPED | OUTPATIENT
Start: 2022-06-21

## 2022-06-30 DIAGNOSIS — J45.40 MODERATE PERSISTENT ASTHMA WITHOUT COMPLICATION: ICD-10-CM

## 2022-07-01 RX ORDER — ALBUTEROL SULFATE 90 UG/1
AEROSOL, METERED RESPIRATORY (INHALATION)
Qty: 18 G | Refills: 0 | Status: SHIPPED | OUTPATIENT
Start: 2022-07-01 | End: 2022-07-15

## 2022-07-15 DIAGNOSIS — J45.40 MODERATE PERSISTENT ASTHMA WITHOUT COMPLICATION: ICD-10-CM

## 2022-07-15 RX ORDER — ALBUTEROL SULFATE 90 UG/1
AEROSOL, METERED RESPIRATORY (INHALATION)
Qty: 18 G | Refills: 0 | Status: SHIPPED | OUTPATIENT
Start: 2022-07-15 | End: 2022-08-08

## 2022-08-08 DIAGNOSIS — J45.40 MODERATE PERSISTENT ASTHMA WITHOUT COMPLICATION: ICD-10-CM

## 2022-08-08 RX ORDER — ALBUTEROL SULFATE 90 UG/1
AEROSOL, METERED RESPIRATORY (INHALATION)
Qty: 18 G | Refills: 0 | Status: SHIPPED | OUTPATIENT
Start: 2022-08-08 | End: 2022-08-23

## 2022-08-23 DIAGNOSIS — J45.40 MODERATE PERSISTENT ASTHMA WITHOUT COMPLICATION: ICD-10-CM

## 2022-08-23 RX ORDER — ALBUTEROL SULFATE 90 UG/1
AEROSOL, METERED RESPIRATORY (INHALATION)
Qty: 18 G | Refills: 0 | Status: SHIPPED | OUTPATIENT
Start: 2022-08-23 | End: 2022-09-11

## 2022-09-10 DIAGNOSIS — J45.40 MODERATE PERSISTENT ASTHMA WITHOUT COMPLICATION: ICD-10-CM

## 2022-09-11 RX ORDER — ALBUTEROL SULFATE 90 UG/1
AEROSOL, METERED RESPIRATORY (INHALATION)
Qty: 18 G | Refills: 0 | Status: SHIPPED | OUTPATIENT
Start: 2022-09-11

## 2022-09-27 RX ORDER — MONTELUKAST SODIUM 10 MG/1
TABLET ORAL
Qty: 90 TABLET | Refills: 0 | Status: SHIPPED | OUTPATIENT
Start: 2022-09-27 | End: 2022-10-04

## 2022-10-04 RX ORDER — MONTELUKAST SODIUM 10 MG/1
TABLET ORAL
Qty: 90 TABLET | Refills: 0 | Status: SHIPPED | OUTPATIENT
Start: 2022-10-04

## 2022-12-11 RX ORDER — AMLODIPINE BESYLATE 2.5 MG/1
TABLET ORAL
Qty: 90 TABLET | OUTPATIENT
Start: 2022-12-11

## 2022-12-12 RX ORDER — AMLODIPINE BESYLATE 2.5 MG/1
2.5 TABLET ORAL DAILY
Qty: 30 TABLET | Refills: 0 | Status: SHIPPED | OUTPATIENT
Start: 2022-12-12

## 2022-12-12 NOTE — TELEPHONE ENCOUNTER
Future Appointments:  Future Appointments   Date Time Provider Joe Pittmani   12/16/2022 11:00 AM Berenice Castillo MD [facility] BS AMB        Last Appointment With Me:  6/14/2022     Requested Prescriptions     Pending Prescriptions Disp Refills    amLODIPine (NORVASC) 2.5 mg tablet 30 Tablet 0     Sig: Take 1 Tablet by mouth daily.

## 2022-12-14 RX ORDER — AMLODIPINE BESYLATE 2.5 MG/1
2.5 TABLET ORAL DAILY
Qty: 90 TABLET | OUTPATIENT
Start: 2022-12-14

## 2023-02-08 NOTE — PROGRESS NOTES
HISTORY OF PRESENT ILLNESS  Johnna Mendoza is a 71 y.o. female. HPI  Last here 6/14/22. Pt is here to f/u on chronic conditions. Pt c/o dysuria, urinary hesitancy/incomplete evacuation, fatigue x 2-3 days, which is how she normally feels with a UTI. Of note, she has a hx of recurrent UTI's, sees Dr. Joesph Salgado (see below) for this. Denies fever, chills, back pain. Had a + UA, will send for culture. Rx'd cipro. Has a history of hypertension  BP today is 120/59  BP at home 120-130/50s-60s  Continues on losartan-HCTZ 100-25mg and norvasc 2.5mg daily      Wt today is 154 lbs, down 4 lbs since lov   Discussed continued diet and w/l      Reviewed labs  Will get labs today    Notes she has seen Dr. Mallika Vaughan (hem) 3 times for anemia chronic anemia with normal ferritin and iron panel  Last there 2 weeks ago 1/23, he ran labs   Will f/U in 3 months, but overall he will monitor      Pt now follows with Dr. Uriel Laureano (derm)   Last visit was 4/19   Saw Dr. Juan Piedra 6/22, follows annually      Pt saw Dr Joesph Salgado (uro gyn) for recurrent UTI sx  No longer taking imvexxy    Discussed f/U       She saw Dr. Ottoniel Amin (pulm) for cough   Last there 5/22 with PA, has not needed prednisone recently  Continues singulair daily and breo  Not on flovent or arnuity   She also has albuterol to use prn for breathing - not needed recently   Also uses a generic Allegra  Recall advair caused a rash      She had thumb surgery with Dr. Miguel Angel Corona (ortho) for de quervain's tenosynovitis  Has not needed to f/U recently       Recall ECHO 2/19: EF 56-60%, mild dilation of L atrial cavity    Pt lives with her   No safety equipment     Pt is functionally independent   Does own laundry  Does own driving  Does own bills and meds    No memory concerns   Knows the month, date, year, location   Can recall 3/3 objects      ACP not on file. SDM is her . Pt has this information at home and will bring it in.       PREVENTIVE:    Colonoscopy: 11/19/21  Nain 5 year repeat  Pap: [address] 9/30/21, scheduled 3/23 w/ Dr. Dillard [place]: 9/30/21 [address] nl, scheduled 3/23  DEXA: 9/30/21 osteopenia [address]  AAA: no FMHX  Tdap: 5/13/2013, due discussed local pharmacy   Pneumovax: 10/23/17  [id number]: 10/18/2016  Zostavax: 9/25/2015  Shingrix: completed both in 2019  Flu shot: 11/22   A1c: 10/18 5.9 5/19 5.9 11/19 5.8 6/21 5.8 6/22 5.8  Eye exam: Dr. Fang Villaseñor 2/23, had cataract surgery previously, taking glaucoma drops  Lipids: 6/22   EKG: 10/23/17, nsr  AAA: 5/19, negative with CT abd   COVID: 5 doses (moderna)    Patient Active Problem List    Diagnosis Date Noted    Cyst of tendon sheath 04/09/2012    Depression, reactive 06/27/2011    HTN (hypertension) 12/14/2009    Asthma 12/14/2009    Allergic rhinitis 12/14/2009     Current Outpatient Medications   Medication Sig Dispense Refill    amLODIPine (NORVASC) 2.5 mg tablet Take 1 Tablet by mouth daily. 30 Tablet 0    montelukast (SINGULAIR) 10 mg tablet TAKE 1 TABLET BY MOUTH EVERY DAY 90 Tablet 0    albuterol (PROVENTIL HFA, VENTOLIN HFA, PROAIR HFA) 90 mcg/actuation inhaler INHALE 2 PUFFS BY MOUTH EVERY 4 HOURS AS NEEDED FOR WHEEZING 18 g 0    losartan-hydroCHLOROthiazide (HYZAAR) 100-25 mg per tablet TAKE 1 TABLET BY MOUTH EVERY DAY 90 Tablet 0    Breo Ellipta 100-25 mcg/dose inhaler INHALE 1 PUFF BY MOUTH DAILY      fluticasone propionate (FLONASE NA) by Nasal route.        Past Surgical History:   Procedure Laterality Date    COLONOSCOPY N/A 11/19/2021    [facility] HI RISK IND  11/19/2021         HX CATARACT REMOVAL      HX ORTHOPAEDIC      back    HX ORTHOPAEDIC Right     thumb surgery      Lab Results   Component Value Date/Time    WBC 7.3 06/14/2022 10:05 AM    HGB 10.4 (L) 06/14/2022 10:05 AM    HCT 34.2 (L) 06/14/2022 10:05 AM    PLATELET 889 71/15/3931 10:05 AM    MCV 98.0 06/14/2022 10:05 AM     Lab Results   Component Value Date/Time    Cholesterol, total 194 06/14/2022 10:05 AM    HDL Cholesterol 62 06/14/2022 10:05 AM    LDL, calculated 115.8 (H) 06/14/2022 10:05 AM    Triglyceride 81 06/14/2022 10:05 AM    CHOL/HDL Ratio 3.1 06/14/2022 10:05 AM     Lab Results   Component Value Date/Time    GFR est non-AA 44 (L) 06/14/2022 10:05 AM    GFRNA, POC 55 (L) 05/06/2019 01:14 PM    GFR est AA 53 (L) 06/14/2022 10:05 AM    GFRAA, POC >60 05/06/2019 01:14 PM    Creatinine 1.21 (H) 06/14/2022 10:05 AM    Creatinine (POC) 1.0 05/06/2019 01:14 PM    BUN 29 (H) 06/14/2022 10:05 AM    Sodium 140 06/14/2022 10:05 AM    Potassium 3.8 06/14/2022 10:05 AM    Chloride 108 06/14/2022 10:05 AM    CO2 30 06/14/2022 10:05 AM        Review of Systems   Respiratory:  Negative for shortness of breath. Cardiovascular:  Negative for chest pain. Genitourinary:  Positive for dysuria. Physical Exam  Constitutional:       General: She is not in acute distress. Appearance: She is not ill-appearing, toxic-appearing or diaphoretic. HENT:      Head: Normocephalic and atraumatic. Right Ear: External ear normal.      Left Ear: External ear normal.   Eyes:      General:         Right eye: No discharge. Left eye: No discharge. Conjunctiva/sclera: Conjunctivae normal.      Pupils: Pupils are equal, round, and reactive to light. Neck:      Vascular: No carotid bruit. Cardiovascular:      Rate and Rhythm: Normal rate and regular rhythm. Heart sounds: Murmur heard. No friction rub. No gallop. Pulmonary:      Effort: No respiratory distress. Breath sounds: Normal breath sounds. No wheezing or rales. Chest:      Chest wall: No tenderness. Musculoskeletal:         General: Normal range of motion. Cervical back: Normal.      Right lower leg: No edema. Left lower leg: No edema. Skin:     General: Skin is warm and dry. Neurological:      Mental Status: She is oriented to person, place, and time.  Mental status is at baseline. Coordination: Coordination normal.      Gait: Gait normal.   Psychiatric:         Mood and Affect: Mood normal.         Behavior: Behavior normal.       ASSESSMENT and PLAN    ICD-10-CM ICD-9-CM    1. Primary hypertension  Y77 166.2 METABOLIC PANEL, BASIC      HEMOGLOBIN A1C WITH EAG   Controlled on losartan hydrochlorothiazide and Norvasc continue    Check metabolic panel prior to follow-up   2. Medicare annual wellness visit, subsequent  Q05.29 E75.6 METABOLIC PANEL, BASIC      HEMOGLOBIN A1C WITH EAG      3. Moderate persistent asthma without complication  K18.92 163.24 METABOLIC PANEL, BASIC      HEMOGLOBIN A1C WITH EAG   Stable with Breo tolerates well no issue also on Singulair   4. Depression, reactive  L43.7 205.2 METABOLIC PANEL, BASIC      HEMOGLOBIN A1C WITH EAG   Controlled without medication   5. Acute cystitis without hematuria  V55.02 901.0 METABOLIC PANEL, BASIC      HEMOGLOBIN A1C WITH EAG   We will treat with Cipro twice daily for 3 days we will check urine culture   6. IFG (impaired fasting glucose)  W20.24 883.61 METABOLIC PANEL, BASIC      HEMOGLOBIN A1C WITH EAG   Check A1c   7. Anemia, unspecified type  H94.9 516.4 METABOLIC PANEL, BASIC      HEMOGLOBIN A1C WITH EAG   Now following with hematology will get notes for review     Depression screen reviewed and negative. Scribed by Manuel Hubbard, as dictated by Dr. Aye Wayne. Current diagnosis and concerns discussed with pt at length. Pt understands risks and benefits or current treatment plan and medications, and accepts the treatment and medication with any possible risks. Pt asks appropriate questions, which were answered. Pt was instructed to call with any concerns or problems. I have reviewed the note documented by the scribe. The services provided are my own. The documentation is accurate.

## 2023-02-11 NOTE — PATIENT INSTRUCTIONS
Medicare Wellness Visit, Female     The best way to live healthy is to have a lifestyle where you eat a well-balanced diet, exercise regularly, limit alcohol use, and quit all forms of tobacco/nicotine, if applicable. Regular preventive services are another way to keep healthy. Preventive services (vaccines, screening tests, monitoring & exams) can help personalize your care plan, which helps you manage your own care. Screening tests can find health problems at the earliest stages, when they are easiest to treat. [facility] follows the current, evidence-based guidelines published by the [facility] Woo ([facility]) when recommending preventive services for our patients. Because we follow these guidelines, sometimes recommendations change over time as research supports it. (For example, mammograms used to be recommended annually. Even though Medicare will still pay for an annual mammogram, the newer guidelines recommend a mammogram every two years for women of average risk). Of course, you and your doctor may decide to screen more often for some diseases, based on your risk and your co-morbidities (chronic disease you are already diagnosed with). Preventive services for you include:  - Medicare offers their members a free annual wellness visit, which is time for you and your primary care provider to discuss and plan for your preventive service needs.  Take advantage of this benefit every year!    -Over the age of 72 should receive the recommended pneumonia vaccines.    -All adults should have a flu vaccine yearly.  -All adults should have a tetanus vaccine every 10 years.   -Over the age 48 should receive the shingles vaccines.        -All adults should be screened once for Hepatitis C.  -All adults age 38-68 who are overweight should have a diabetes screening test once every three years.   -Other screening tests and preventive services for persons with diabetes include: an eye exam to screen for diabetic retinopathy, a kidney function test, a foot exam, and stricter control over your cholesterol.   -Cardiovascular screening for adults with routine risk involves an electrocardiogram (ECG) at intervals determined by your doctor.     -Colorectal cancer screenings should be done for adults age 39-70 with no increased risk factors for colorectal cancer. There are a number of acceptable methods of screening for this type of cancer. Each test has its own benefits and drawbacks. Discuss with your doctor what is most appropriate for you during your annual wellness visit. The different tests include: colonoscopy (considered the best screening method), a fecal occult blood test, a fecal DNA test, and sigmoidoscopy.    -Lung cancer screening is recommended annually with a low dose CT scan for adults between age 54 and 68, who have smoked at least 30 pack years (equivalent of 1 pack per day for 30 days), and who is a current smoker or quit less than 15 years ago.    -A bone mass density test is recommended when a woman turns 65 to screen for osteoporosis. This test is only recommended one time, as a screening. Some providers will use this same test as a disease monitoring tool if you already have osteoporosis. -Breast cancer screenings are recommended every other year for women of normal risk, age 54-69.    -Cervical cancer screenings for women over age 72 are only recommended with certain risk factors.      Here is a list of your current Health Maintenance items (your personalized list of preventive services) with a due date:  Health Maintenance Due   Topic Date Due    Yearly Flu Vaccine (1) 08/01/2022    Annual Well Visit  12/15/2022

## 2023-02-11 NOTE — PROGRESS NOTES
This is the Subsequent Medicare Annual Wellness Exam, performed 12 months or more after the Initial AWV or the last Subsequent AWV    I have reviewed the patient's medical history in detail and updated the computerized patient record. Assessment/Plan   Education and counseling provided:  Are appropriate based on today's review and evaluation    1. Medicare annual wellness visit, subsequent     Depression Risk Factor Screening     3 most recent PHQ Screens 2/13/2023   Little interest or pleasure in doing things Not at all   Feeling down, depressed, irritable, or hopeless Not at all   Total Score PHQ 2 0       Alcohol & Drug Abuse Risk Screen    Do you average more than 1 drink per night or more than 7 drinks a week:  No    On any one occasion in the past three months have you have had more than 3 drinks containing alcohol:  No  2 per week        Functional Ability and Level of Safety    Hearing: Hearing is good. Activities of Daily Living: The home contains: no safety equipment. Patient does total self care      Ambulation: with no difficulty     Fall Risk:  Fall Risk Assessment, last 12 mths 2/13/2023   Able to walk? Yes   Fall in past 12 months? 0   Do you feel unsteady?  0   Are you worried about falling 0      Abuse Screen:  Patient is not abused   Lives w      Cognitive Screening    Has your family/caregiver stated any concerns about your memory: no     Cognitive Screening: Normal - Mini Cog Test    No memory concerns   Pt knows the month, day and year   Can recall 3/3 objects      Health Maintenance Due     Health Maintenance Due   Topic Date Due    Flu Vaccine (1) 08/01/2022    Medicare Yearly Exam  12/15/2022       Patient Care Team   Patient Care Team:  Harpreet Quintanilla MD as PCP - General (Internal Medicine Physician)  Harpreet Quintanilla MD as PCP - [facility]  Matty Mariee MD (Ophthalmology)  Amaris Conde MD (Gastroenterology)  Marty Mooer MD (Obstetrics & Gynecology)  Zeus Hassan MD (Orthopedic Surgery)  King Brandi MD (Dermatology Physician)  Mckayla Camacho MD (Female Pelvic Medicine and Reconstructive Surgery)  Princess Calles MD (Internal Medicine Physician)  Luz Castro MD (Orthopedic Surgery)    History     Patient Active Problem List   Diagnosis Code    HTN (hypertension) I10    Asthma J45.909    Allergic rhinitis J30.9    Depression, reactive F32.9    Cyst of tendon sheath M67.80     Past Medical History:   Diagnosis Date    Ankle sprain 03/2014    right    Asthma 12/14/2009    HTN (hypertension) 12/14/2009    Iron deficiency anemia 12/14/2009      Past Surgical History:   Procedure Laterality Date    COLONOSCOPY N/A 11/19/2021    COLONOSCOPY performed by Yuki Rodriguez MD at [facility]; HI RISK IND  11/19/2021         HX CATARACT REMOVAL      HX ORTHOPAEDIC      back    HX ORTHOPAEDIC Right     thumb surgery     Current Outpatient Medications   Medication Sig Dispense Refill    amLODIPine (NORVASC) 2.5 mg tablet Take 1 Tablet by mouth daily. 30 Tablet 0    montelukast (SINGULAIR) 10 mg tablet TAKE 1 TABLET BY MOUTH EVERY DAY 90 Tablet 0    albuterol (PROVENTIL HFA, VENTOLIN HFA, PROAIR HFA) 90 mcg/actuation inhaler INHALE 2 PUFFS BY MOUTH EVERY 4 HOURS AS NEEDED FOR WHEEZING 18 g 0    losartan-hydroCHLOROthiazide (HYZAAR) 100-25 mg per tablet TAKE 1 TABLET BY MOUTH EVERY DAY 90 Tablet 0    Breo Ellipta 100-25 mcg/dose inhaler INHALE 1 PUFF BY MOUTH DAILY      fluticasone propionate (FLONASE NA) by Nasal route. Allergies   Allergen Reactions    Acetylcarnitine Swelling    Ace Inhibitors Cough    Aleve [Naproxen Sodium] Hives    Daypro [Oxaprozin] Rash    Effexor [Venlafaxine] Other (comments)     Abdominal pain    Prednisone Swelling     Face swelling ONLY with large dosage    Shellfish Containing Products Swelling     Throat and tounge       No family history on file.   Social History Tobacco Use    Smoking status: Never    Smokeless tobacco: Never   Substance Use Topics    Alcohol use: Yes     Alcohol/week: 0.8 standard drinks     Types: 1 Glasses of wine per week     Comment: occasionally     ACP not on file. SDM is her . Pt has this information at home and will bring it in. Colonoscopy: 11/19/21 Dr. Jac Copeland 5 year repeat  Pap: [address] 9/30/21--scheduled 3/23  Mammogram: 9/30/21 [address] nl annual --scheduled 3/23  DEXA: 9/30/21 osteopenia [address] q2 years   AAA: no FMHX    Tdap: 5/13/2013--due   Pneumovax: 10/23/17  Xoecypg04: 10/18/2016  Zostavax: 9/25/2015  Shingrix: completed both in 2019  Flu shot: 11/22    A1c:   6/22 5.8 due now     Eye exam: Dr. Cecil Pastrana , 2/23 annual     Lipids: 6/22  annual   EKG: 10/23/17, nsr  AAA: 5/19, negative with CT abd     moderna x 5     Medication reconciliation completed by MA and reviewed by me. Medical/surgical/social/family history reviewed and updated by me. Patient provided AVS and preventative screening table. Patient verbalized understanding of all information discussed.        Makayla Tracy MD

## 2023-02-13 ENCOUNTER — OFFICE VISIT (OUTPATIENT)
Dept: INTERNAL MEDICINE CLINIC | Age: 73
End: 2023-02-13
Payer: MEDICARE

## 2023-02-13 VITALS
DIASTOLIC BLOOD PRESSURE: 59 MMHG | SYSTOLIC BLOOD PRESSURE: 120 MMHG | OXYGEN SATURATION: 100 % | RESPIRATION RATE: 14 BRPM | HEART RATE: 76 BPM | BODY MASS INDEX: 27.29 KG/M2 | WEIGHT: 154 LBS | TEMPERATURE: 98.4 F | HEIGHT: 63 IN

## 2023-02-13 DIAGNOSIS — N30.00 ACUTE CYSTITIS WITHOUT HEMATURIA: ICD-10-CM

## 2023-02-13 DIAGNOSIS — Z00.00 MEDICARE ANNUAL WELLNESS VISIT, SUBSEQUENT: ICD-10-CM

## 2023-02-13 DIAGNOSIS — R73.01 IFG (IMPAIRED FASTING GLUCOSE): ICD-10-CM

## 2023-02-13 DIAGNOSIS — F32.9 DEPRESSION, REACTIVE: ICD-10-CM

## 2023-02-13 DIAGNOSIS — J45.40 MODERATE PERSISTENT ASTHMA WITHOUT COMPLICATION: ICD-10-CM

## 2023-02-13 DIAGNOSIS — I10 PRIMARY HYPERTENSION: Primary | ICD-10-CM

## 2023-02-13 DIAGNOSIS — D64.9 ANEMIA, UNSPECIFIED TYPE: ICD-10-CM

## 2023-02-13 LAB
BILIRUB UR QL STRIP: NEGATIVE
GLUCOSE UR-MCNC: NEGATIVE MG/DL
KETONES P FAST UR STRIP-MCNC: NEGATIVE MG/DL
PH UR STRIP: 6.5 [PH] (ref 4.6–8)
PROT UR QL STRIP: NEGATIVE
SP GR UR STRIP: 1.01 (ref 1–1.03)
UA UROBILINOGEN AMB POC: NORMAL (ref 0.2–1)
URINALYSIS CLARITY POC: CLEAR
URINALYSIS COLOR POC: YELLOW
URINE BLOOD POC: NORMAL
URINE LEUKOCYTES POC: NORMAL
URINE NITRITES POC: NEGATIVE

## 2023-02-13 PROCEDURE — 1101F PT FALLS ASSESS-DOCD LE1/YR: CPT | Performed by: INTERNAL MEDICINE

## 2023-02-13 PROCEDURE — 3017F COLORECTAL CA SCREEN DOC REV: CPT | Performed by: INTERNAL MEDICINE

## 2023-02-13 PROCEDURE — 99214 OFFICE O/P EST MOD 30 MIN: CPT | Performed by: INTERNAL MEDICINE

## 2023-02-13 PROCEDURE — G8427 DOCREV CUR MEDS BY ELIG CLIN: HCPCS | Performed by: INTERNAL MEDICINE

## 2023-02-13 PROCEDURE — G8399 PT W/DXA RESULTS DOCUMENT: HCPCS | Performed by: INTERNAL MEDICINE

## 2023-02-13 PROCEDURE — G8417 CALC BMI ABV UP PARAM F/U: HCPCS | Performed by: INTERNAL MEDICINE

## 2023-02-13 PROCEDURE — 81002 URINALYSIS NONAUTO W/O SCOPE: CPT | Performed by: INTERNAL MEDICINE

## 2023-02-13 PROCEDURE — G9717 DOC PT DX DEP/BP F/U NT REQ: HCPCS | Performed by: INTERNAL MEDICINE

## 2023-02-13 PROCEDURE — G0439 PPPS, SUBSEQ VISIT: HCPCS | Performed by: INTERNAL MEDICINE

## 2023-02-13 PROCEDURE — G0463 HOSPITAL OUTPT CLINIC VISIT: HCPCS | Performed by: INTERNAL MEDICINE

## 2023-02-13 PROCEDURE — 1090F PRES/ABSN URINE INCON ASSESS: CPT | Performed by: INTERNAL MEDICINE

## 2023-02-13 PROCEDURE — G8536 NO DOC ELDER MAL SCRN: HCPCS | Performed by: INTERNAL MEDICINE

## 2023-02-13 RX ORDER — CIPROFLOXACIN 250 MG/1
250 TABLET, FILM COATED ORAL 2 TIMES DAILY
Qty: 6 TABLET | Refills: 0 | Status: SHIPPED | OUTPATIENT
Start: 2023-02-13 | End: 2023-02-16

## 2023-02-13 RX ORDER — FLUTICASONE FUROATE AND VILANTEROL TRIFENATATE 100; 25 UG/1; UG/1
1 POWDER RESPIRATORY (INHALATION) DAILY
Qty: 60 EACH | Refills: 1 | Status: SHIPPED | OUTPATIENT
Start: 2023-02-13

## 2023-02-13 NOTE — PROGRESS NOTES
1. \"Have you been to the ER, urgent care clinic since your last visit? Hospitalized since your last visit? \" No    2. \"Have you seen or consulted any other health care providers outside of the [address] [place] [address] since your last visit? \" No     3. For patients aged 39-70: Has the patient had a colonoscopy / FIT/ Cologuard? Yes - Care Gap present. Most recent result on file      If the patient is female:    4. For patients aged 41-77: Has the patient had a mammogram within the past 2 years? Yes - Care Gap present. Most recent result on file      5. For patients aged 21-65: Has the patient had a pap smear?  NA - based on age or sex

## 2023-02-14 LAB
ANION GAP SERPL CALC-SCNC: 6 MMOL/L (ref 5–15)
BUN SERPL-MCNC: 26 MG/DL (ref 6–20)
BUN/CREAT SERPL: 24 (ref 12–20)
CALCIUM SERPL-MCNC: 9.3 MG/DL (ref 8.5–10.1)
CHLORIDE SERPL-SCNC: 107 MMOL/L (ref 97–108)
CO2 SERPL-SCNC: 28 MMOL/L (ref 21–32)
CREAT SERPL-MCNC: 1.1 MG/DL (ref 0.55–1.02)
EST. AVERAGE GLUCOSE BLD GHB EST-MCNC: 117 MG/DL
GLUCOSE SERPL-MCNC: 84 MG/DL (ref 65–100)
HBA1C MFR BLD: 5.7 % (ref 4–5.6)
POTASSIUM SERPL-SCNC: 3.6 MMOL/L (ref 3.5–5.1)
SODIUM SERPL-SCNC: 141 MMOL/L (ref 136–145)

## 2023-02-15 LAB
BACTERIA SPEC CULT: NORMAL
SERVICE CMNT-IMP: NORMAL

## 2023-02-17 ENCOUNTER — TELEPHONE (OUTPATIENT)
Dept: INTERNAL MEDICINE CLINIC | Age: 73
End: 2023-02-17

## 2023-02-17 RX ORDER — NITROFURANTOIN 25; 75 MG/1; MG/1
100 CAPSULE ORAL 2 TIMES DAILY
Qty: 10 CAPSULE | Refills: 0 | Status: SHIPPED | OUTPATIENT
Start: 2023-02-17 | End: 2023-02-22

## 2023-02-17 NOTE — TELEPHONE ENCOUNTER
Spoke with Dr. Hollie Villa, she states she will send in antibiotics and respond to patient message    Patient updated

## 2023-02-17 NOTE — TELEPHONE ENCOUNTER
Patient states she sent a My Chart Message & also called earlier in reference to Persistent UTI Symptoms & medication Prescribed & needs to discuss Plan of Care Prior to Close Of Business today or the weekend. Please call.  Thank you

## 2023-03-02 NOTE — TELEPHONE ENCOUNTER
Future Appointments:  Future Appointments   Date Time Provider Joe Pittmani   8/14/2023 11:45 AM iJmmy Mims MD [facility] BS AMB        Last Appointment With Me:  2/13/2023     Requested Prescriptions     Pending Prescriptions Disp Refills    losartan-hydroCHLOROthiazide (HYZAAR) 100-25 mg per tablet 90 Tablet 0     Sig: Take 1 Tablet by mouth daily.

## 2023-03-03 RX ORDER — LOSARTAN POTASSIUM AND HYDROCHLOROTHIAZIDE 25; 100 MG/1; MG/1
1 TABLET ORAL DAILY
Qty: 90 TABLET | Refills: 0 | Status: SHIPPED | OUTPATIENT
Start: 2023-03-03

## 2023-05-08 ENCOUNTER — PATIENT MESSAGE (OUTPATIENT)
Age: 73
End: 2023-05-08

## 2023-05-09 RX ORDER — ALBUTEROL SULFATE 90 UG/1
2 AEROSOL, METERED RESPIRATORY (INHALATION) EVERY 6 HOURS PRN
Qty: 18 G | Refills: 3 | Status: SHIPPED | OUTPATIENT
Start: 2023-05-09

## 2023-05-09 NOTE — TELEPHONE ENCOUNTER
Future Appointments:  Future Appointments   Date Time Provider Jimmy Flowersi   8/14/2023 11:45 AM Darrick Rogers MD [facility] BS AMB        Last Appointment With Me:  2/13/2023     Requested Prescriptions     Pending Prescriptions Disp Refills    albuterol sulfate HFA (PROVENTIL HFA) 108 (90 Base) MCG/ACT inhaler 18 g 3     Sig: Inhale 2 puffs into the lungs every 6 hours as needed for Wheezing

## 2023-05-09 NOTE — TELEPHONE ENCOUNTER
Lilibeth Matute 5/9/2023 8:25 AM EDT      ----- Message -----  From: Fara Hayes  Sent: 5/8/2023 7:45 PM EDT  To: , *  Subject: Refill of Albuterol inhaler     My pharmacy informs me that my insurance will no longer cover the type of inhaler I have. They want a change to Ventolin. The pharmacy suggested I contact you and they are sending a request as well. Can you send a prescription for that to my pharmacy, ([facility] in [place], [place]). Thank you  CAT Boucher

## 2023-05-10 ENCOUNTER — PATIENT MESSAGE (OUTPATIENT)
Age: 73
End: 2023-05-10

## 2023-05-11 DIAGNOSIS — J45.909 ASTHMA, UNSPECIFIED ASTHMA SEVERITY, UNSPECIFIED WHETHER COMPLICATED, UNSPECIFIED WHETHER PERSISTENT: Primary | ICD-10-CM

## 2023-05-11 RX ORDER — FLUTICASONE FUROATE AND VILANTEROL 100; 25 UG/1; UG/1
1 POWDER RESPIRATORY (INHALATION) DAILY
Qty: 60 EACH | Refills: 1 | Status: CANCELLED | OUTPATIENT
Start: 2023-05-11

## 2023-05-11 RX ORDER — FLUTICASONE FUROATE AND VILANTEROL 100; 25 UG/1; UG/1
1 POWDER RESPIRATORY (INHALATION) DAILY
Qty: 1 EACH | Refills: 1 | Status: CANCELLED | OUTPATIENT
Start: 2023-05-11

## 2023-05-11 RX ORDER — ALBUTEROL SULFATE 90 UG/1
2 AEROSOL, METERED RESPIRATORY (INHALATION) EVERY 4 HOURS PRN
Qty: 18 G | Refills: 1 | Status: CANCELLED | OUTPATIENT
Start: 2023-05-11

## 2023-05-11 NOTE — TELEPHONE ENCOUNTER
PCP: Cindy Connolly MD    Last appt: [unfilled]  Future Appointments   Date Time Provider Jimmy Albert   8/14/2023 11:45 AM Dangelo Jennings MD [facility] BS AMB       Requested Prescriptions     Pending Prescriptions Disp Refills    fluticasone furoate-vilanterol (BREO ELLIPTA) 100-25 MCG/ACT inhaler 1 each 0     Sig: Inhale 1 puff into the lungs daily

## 2023-05-11 NOTE — TELEPHONE ENCOUNTER
[facility] 5/11/2023 10:28 AM EDT      ----- Message -----  From: Master May  Sent: 5/10/2023 9:44 PM EDT  To: , *  Subject: Medication refills     This is my second message. My first was Beryle Raymond May 8th. I also need a refill of my BREO as well. Also, My pharmacy informs me that my insurance will no longer cover the type of inhaler I have. They want a change to Ventolin. The pharmacy suggested I contact you and they are sending a request as well. Can you send a prescription for that to my pharmacy, ([facility] in [place], [place]). Thank you  CAT Boucher

## 2023-05-12 RX ORDER — FLUTICASONE FUROATE AND VILANTEROL 100; 25 UG/1; UG/1
1 POWDER RESPIRATORY (INHALATION) DAILY
Qty: 1 EACH | Refills: 0 | Status: SHIPPED | OUTPATIENT
Start: 2023-05-12

## 2023-06-05 ENCOUNTER — PATIENT MESSAGE (OUTPATIENT)
Age: 73
End: 2023-06-05

## 2023-06-05 DIAGNOSIS — J30.9 ALLERGIC RHINITIS, UNSPECIFIED SEASONALITY, UNSPECIFIED TRIGGER: ICD-10-CM

## 2023-06-05 DIAGNOSIS — I10 HYPERTENSION, UNSPECIFIED TYPE: Primary | ICD-10-CM

## 2023-06-05 RX ORDER — AMLODIPINE BESYLATE 2.5 MG/1
2.5 TABLET ORAL DAILY
Qty: 30 TABLET | Refills: 2 | Status: SHIPPED | OUTPATIENT
Start: 2023-06-05

## 2023-06-05 RX ORDER — MONTELUKAST SODIUM 10 MG/1
10 TABLET ORAL DAILY
Qty: 30 TABLET | Refills: 2 | Status: SHIPPED | OUTPATIENT
Start: 2023-06-05

## 2023-06-05 NOTE — TELEPHONE ENCOUNTER
From: Carmelita Tafoya  To: Dr. Fortunato Leonard: 6/5/2023 9:54 AM EDT  Subject: Medication refills    I need refills of montelukast and amlodipine. It tells me they cannot be refilled through my chart. Please refill each with a 90 day supply, if possible. Thank you so much.    Jessica

## 2023-06-05 NOTE — TELEPHONE ENCOUNTER
PCP: Triston Jimenez MD    Last appt: 2/13/2023  Future Appointments   Date Time Provider Jimmy Albert   8/14/2023 11:45 AM Uri Barnett MD [facility] BS AMB       Requested Prescriptions     Pending Prescriptions Disp Refills    montelukast (SINGULAIR) 10 MG tablet 30 tablet 2     Sig: Take 1 tablet by mouth daily    amLODIPine (NORVASC) 2.5 MG tablet 30 tablet 2     Sig: Take 1 tablet by mouth daily

## 2023-06-06 RX ORDER — LOSARTAN POTASSIUM AND HYDROCHLOROTHIAZIDE 25; 100 MG/1; MG/1
1 TABLET ORAL DAILY
Qty: 90 TABLET | Refills: 0 | Status: SHIPPED | OUTPATIENT
Start: 2023-06-06

## 2023-06-06 NOTE — TELEPHONE ENCOUNTER
PCP: Natalia Freitas MD    Last appt: [unfilled]  Future Appointments   Date Time Provider Jimmy Albert   8/14/2023 11:45 AM Brittni Mckeon MD [facility] BS AMB       Requested Prescriptions     Pending Prescriptions Disp Refills    losartan-hydroCHLOROthiazide (HYZAAR) 100-25 MG per tablet 90 tablet 0     Sig: Take 1 tablet by mouth daily

## 2023-08-14 ASSESSMENT — ENCOUNTER SYMPTOMS: SHORTNESS OF BREATH: 0

## 2023-08-14 NOTE — PROGRESS NOTES
HISTORY OF PRESENT ILLNESS  Melonie Leyva is a 71 y.o. female. HPI    This is an established visit completed with telemedicine was completed with video assist. The patient acknowledges and agrees to this method of visitation. Last here 2/13/23. Pt is here to f/u on chronic conditions. Has a history of hypertension  BP today is 128/68 generally 120s/60s 70s     Continues on losartan-HCTZ 100-25mg and norvasc 2.5mg daily      Wt today is 154 lbs, stable since lov per pt  Discussed continued diet and w/l      Reviewed labs  Ordered labs     Notes she has seen Dr. Rashawn Dorsey (hem) 3 times for anemia chronic anemia with normal ferritin and iron panel  Last there 4/23, he ran labs, f/u in 1 yr reviewed notes from November 2022 that said she was taking iron gummy no intervention  He is monitoring, no meds needed  Low iron but no intervention needed        Mild CKD with creatinine running 1.1-1.2 we will monitor discussed with patient       Saw Dr. Rayna Mcneil dermatology 6/22, follows annually she is due for follow-up     Pt saw Dr Rafael Samayoa (uro gyn) for recurrent UTI sx  No longer taking imvexxy    Follows as needed no recent issues      She saw Dr. Alex Macedo (pulm) for cough   Last there 5/22 with PA, has not needed prednisone recently  F/u annually she missed her last appointment she will call and schedule follow-up  Continues singulair daily and breo--doing well with this no recent flares  Not on flovent or arnuity   She also has albuterol to use prn for breathing - not needed recently   Also uses a generic Allegra  Recall advair caused a rash     She saw Dr. Claudio Noe (fam med) 4/7/23 for asthma attack, exposure to COVID and sinusitis     She had thumb surgery with Dr. Gerardo Waters (ortho) for de quervain's tenosynovitis  Has not needed to f/U recently       Recall ECHO 2/19: EF 56-60%, mild dilation of L atrial cavity     Pt lives with her   No safety equipment     ACP not on file. SDM is her .   Pt has this

## 2023-08-18 ENCOUNTER — TELEMEDICINE (OUTPATIENT)
Age: 73
End: 2023-08-18
Payer: MEDICARE

## 2023-08-18 DIAGNOSIS — I10 PRIMARY HYPERTENSION: Primary | ICD-10-CM

## 2023-08-18 DIAGNOSIS — J45.20 MILD INTERMITTENT ASTHMA, UNSPECIFIED WHETHER COMPLICATED: ICD-10-CM

## 2023-08-18 DIAGNOSIS — I10 HYPERTENSION, UNSPECIFIED TYPE: ICD-10-CM

## 2023-08-18 DIAGNOSIS — N18.31 STAGE 3A CHRONIC KIDNEY DISEASE (HCC): ICD-10-CM

## 2023-08-18 DIAGNOSIS — R73.01 IFG (IMPAIRED FASTING GLUCOSE): ICD-10-CM

## 2023-08-18 DIAGNOSIS — M85.89 OSTEOPENIA OF MULTIPLE SITES: ICD-10-CM

## 2023-08-18 DIAGNOSIS — D50.9 IRON DEFICIENCY ANEMIA, UNSPECIFIED IRON DEFICIENCY ANEMIA TYPE: ICD-10-CM

## 2023-08-18 PROBLEM — N18.30 CHRONIC RENAL DISEASE, STAGE III (HCC): Status: ACTIVE | Noted: 2023-08-18

## 2023-08-18 PROCEDURE — 99214 OFFICE O/P EST MOD 30 MIN: CPT | Performed by: INTERNAL MEDICINE

## 2023-08-18 PROCEDURE — 3017F COLORECTAL CA SCREEN DOC REV: CPT | Performed by: INTERNAL MEDICINE

## 2023-08-18 PROCEDURE — G8400 PT W/DXA NO RESULTS DOC: HCPCS | Performed by: INTERNAL MEDICINE

## 2023-08-18 PROCEDURE — 1123F ACP DISCUSS/DSCN MKR DOCD: CPT | Performed by: INTERNAL MEDICINE

## 2023-08-18 PROCEDURE — G8427 DOCREV CUR MEDS BY ELIG CLIN: HCPCS | Performed by: INTERNAL MEDICINE

## 2023-08-18 PROCEDURE — 1090F PRES/ABSN URINE INCON ASSESS: CPT | Performed by: INTERNAL MEDICINE

## 2023-08-18 RX ORDER — LOSARTAN POTASSIUM AND HYDROCHLOROTHIAZIDE 25; 100 MG/1; MG/1
1 TABLET ORAL DAILY
Qty: 90 TABLET | Refills: 1 | Status: SHIPPED | OUTPATIENT
Start: 2023-08-18

## 2023-08-18 RX ORDER — ALBUTEROL SULFATE 90 UG/1
2 AEROSOL, METERED RESPIRATORY (INHALATION) EVERY 6 HOURS PRN
Qty: 18 G | Refills: 3 | Status: SHIPPED | OUTPATIENT
Start: 2023-08-18

## 2023-08-18 RX ORDER — AMLODIPINE BESYLATE 2.5 MG/1
2.5 TABLET ORAL DAILY
Qty: 90 TABLET | Refills: 1 | Status: SHIPPED | OUTPATIENT
Start: 2023-08-18

## 2023-08-18 SDOH — ECONOMIC STABILITY: FOOD INSECURITY: WITHIN THE PAST 12 MONTHS, THE FOOD YOU BOUGHT JUST DIDN'T LAST AND YOU DIDN'T HAVE MONEY TO GET MORE.: NEVER TRUE

## 2023-08-18 SDOH — ECONOMIC STABILITY: INCOME INSECURITY: HOW HARD IS IT FOR YOU TO PAY FOR THE VERY BASICS LIKE FOOD, HOUSING, MEDICAL CARE, AND HEATING?: NOT HARD AT ALL

## 2023-08-18 SDOH — ECONOMIC STABILITY: FOOD INSECURITY: WITHIN THE PAST 12 MONTHS, YOU WORRIED THAT YOUR FOOD WOULD RUN OUT BEFORE YOU GOT MONEY TO BUY MORE.: NEVER TRUE

## 2023-08-18 SDOH — ECONOMIC STABILITY: HOUSING INSECURITY
IN THE LAST 12 MONTHS, WAS THERE A TIME WHEN YOU DID NOT HAVE A STEADY PLACE TO SLEEP OR SLEPT IN A SHELTER (INCLUDING NOW)?: NO

## 2023-08-18 ASSESSMENT — PATIENT HEALTH QUESTIONNAIRE - PHQ9
9. THOUGHTS THAT YOU WOULD BE BETTER OFF DEAD, OR OF HURTING YOURSELF: 0
4. FEELING TIRED OR HAVING LITTLE ENERGY: 0
SUM OF ALL RESPONSES TO PHQ QUESTIONS 1-9: 0
3. TROUBLE FALLING OR STAYING ASLEEP: 0
SUM OF ALL RESPONSES TO PHQ QUESTIONS 1-9: 0
5. POOR APPETITE OR OVEREATING: 0
SUM OF ALL RESPONSES TO PHQ QUESTIONS 1-9: 0
7. TROUBLE CONCENTRATING ON THINGS, SUCH AS READING THE NEWSPAPER OR WATCHING TELEVISION: 0
2. FEELING DOWN, DEPRESSED OR HOPELESS: 0
1. LITTLE INTEREST OR PLEASURE IN DOING THINGS: 0
SUM OF ALL RESPONSES TO PHQ9 QUESTIONS 1 & 2: 0
6. FEELING BAD ABOUT YOURSELF - OR THAT YOU ARE A FAILURE OR HAVE LET YOURSELF OR YOUR FAMILY DOWN: 0
10. IF YOU CHECKED OFF ANY PROBLEMS, HOW DIFFICULT HAVE THESE PROBLEMS MADE IT FOR YOU TO DO YOUR WORK, TAKE CARE OF THINGS AT HOME, OR GET ALONG WITH OTHER PEOPLE: 0
SUM OF ALL RESPONSES TO PHQ QUESTIONS 1-9: 0
8. MOVING OR SPEAKING SO SLOWLY THAT OTHER PEOPLE COULD HAVE NOTICED. OR THE OPPOSITE, BEING SO FIGETY OR RESTLESS THAT YOU HAVE BEEN MOVING AROUND A LOT MORE THAN USUAL: 0

## 2023-08-18 NOTE — PROGRESS NOTES
1. \"Have you been to the ER, urgent care clinic since your last visit? Hospitalized since your last visit? \" No    2. \"Have you seen or consulted any other health care providers outside of the [address] [place] [address] since your last visit? \" No     3. For patients aged 43-73: Has the patient had a colonoscopy / FIT/ Cologuard? Yes - Care Gap present. Most recent result on file      If the patient is female:    4. For patients aged 43-66: Has the patient had a mammogram within the past 2 years? Yes - Care Gap present. Most recent result on file      5. For patients aged 21-65: Has the patient had a pap smear?  NA - based on age or sex

## 2023-10-22 DIAGNOSIS — J30.9 ALLERGIC RHINITIS, UNSPECIFIED SEASONALITY, UNSPECIFIED TRIGGER: ICD-10-CM

## 2023-10-23 RX ORDER — MONTELUKAST SODIUM 10 MG/1
10 TABLET ORAL DAILY
Qty: 30 TABLET | Refills: 2 | Status: SHIPPED | OUTPATIENT
Start: 2023-10-23

## 2024-01-20 DIAGNOSIS — J30.9 ALLERGIC RHINITIS, UNSPECIFIED SEASONALITY, UNSPECIFIED TRIGGER: ICD-10-CM

## 2024-01-22 RX ORDER — MONTELUKAST SODIUM 10 MG/1
10 TABLET ORAL DAILY
Qty: 30 TABLET | Refills: 2 | Status: SHIPPED | OUTPATIENT
Start: 2024-01-22

## 2024-01-29 NOTE — PROGRESS NOTES
HISTORY OF PRESENT ILLNESS  Carolee Boucher is a 73 y.o. female.  HPI    Last here vv 8/18/23. Pt is here to f/u on chronic conditions.     Has a history of hypertension  BP today is 112/63  BP at home 115-120/70s  Continues on losartan-HCTZ 100-25mg and norvasc 2.5mg daily      Wt today is 143 lbs, down 11 lbs since lov   Discussed continued diet and w/l      Reviewed labs  Ordered pre-labs     Notes she has seen Dr. Henry (hem) 3 times for anemia chronic anemia with normal ferritin and iron panel  Last there 4/23, he ran labs, f/u in 1 yr   He is monitoring, no meds needed  Low iron but no intervention needed      Mild CKD with creatinine running 1.1-1.2   Most recent 2/23 1.10  Will monitor       Saw Dr. Quinn dermatology 6/22, follows annually she is due for follow-up     Pt saw Dr Costello (uro gyn) for recurrent UTI sx  No longer taking imvexxy    Follows as needed no recent issues      She saw Dr. Del Cid (pulm) for cough   Last there recently ~12/23 - he referred her to Dr Ta (allergist)   Continues singulair daily and breo--doing well with this no recent flares  Also on arnuity  Not on flovent    She also has albuterol to use prn for breathing - not needed recently   Also uses a generic Allegra  Recall advair caused a rash      Seeing Dr Ta (allergy) for cough, lov 1/24 - states she was diagnosed with vocal chord dysfunction  She had been struggling with a cough triggered by cold weather and strong smells   Taking atrovent nasal spray which is helping significantly   F/u scheduled 6/24    She saw Dr. Nettles (fam med) 4/7/23 for asthma attack, exposure to COVID and sinusitis     She had thumb surgery with Dr. Yoder (ortho) for de quervain's tenosynovitis  Has not needed to f/U recently       She saw Dr James (ortho) 8/25/23 for L shoulder pain  Reviewed note:  ASSESSMENT/PLAN:  Below is the assessment and plan developed based on review of pertinent history, physical exam, labs, studies, and medications.

## 2024-02-04 NOTE — PROGRESS NOTES
Medicare Annual Wellness Visit    Carolee Boucher is here for Medicare AWV    Assessment & Plan   Primary hypertension  -     Comprehensive Metabolic Panel; Future  -     Hemoglobin A1C; Future  -     CBC; Future  Stage 3a chronic kidney disease (HCC)  -     Comprehensive Metabolic Panel; Future  Mild intermittent asthma, unspecified whether complicated  Depression, reactive  Iron deficiency anemia, unspecified iron deficiency anemia type  Medicare annual wellness visit, subsequent  IFG (impaired fasting glucose)  -     Comprehensive Metabolic Panel; Future  -     Hemoglobin A1C; Future  -     CBC; Future  Osteopenia of multiple sites  -     DEXA BONE DENSITY AXIAL SKELETON; Future  Vocal cord dysfunction     Recommendations for Preventive Services Due: see orders and patient instructions/AVS.  Recommended screening schedule for the next 5-10 years is provided to the patient in written form: see Patient Instructions/AVS.     Return in about 6 months (around 8/6/2024).     Subjective       Patient's complete Health Risk Assessment and screening values have been reviewed and are found in Flowsheets. The following problems were reviewed today and where indicated follow up appointments were made and/or referrals ordered.    No Positive Risk Factors identified today.                                  Objective   Vitals:    02/06/24 1028   BP: 112/63   Site: Left Upper Arm   Position: Sitting   Pulse: 88   Resp: 15   Temp: 97.9 °F (36.6 °C)   TempSrc: Temporal   SpO2: 98%   Weight: 65 kg (143 lb 6.4 oz)   Height: 1.575 m (5' 2\")      Body mass index is 26.23 kg/m².               Allergies   Allergen Reactions    Levocarnitine Swelling    Ace Inhibitors Cough    Bactrim [Sulfamethoxazole-Trimethoprim] Itching    Naproxen Sodium Hives    Prednisone Swelling     Face swelling ONLY with large dosage    Shellfish Allergy Swelling     Throat and tounge    Venlafaxine Other (See Comments)     Abdominal pain    Oxaprozin Rash

## 2024-02-06 ENCOUNTER — NURSE ONLY (OUTPATIENT)
Age: 74
End: 2024-02-06
Payer: MEDICARE

## 2024-02-06 ENCOUNTER — OFFICE VISIT (OUTPATIENT)
Age: 74
End: 2024-02-06
Payer: MEDICARE

## 2024-02-06 VITALS
SYSTOLIC BLOOD PRESSURE: 112 MMHG | HEART RATE: 88 BPM | DIASTOLIC BLOOD PRESSURE: 63 MMHG | HEIGHT: 62 IN | RESPIRATION RATE: 15 BRPM | OXYGEN SATURATION: 98 % | TEMPERATURE: 97.9 F | BODY MASS INDEX: 26.39 KG/M2 | WEIGHT: 143.4 LBS

## 2024-02-06 DIAGNOSIS — J45.20 MILD INTERMITTENT ASTHMA, UNSPECIFIED WHETHER COMPLICATED: ICD-10-CM

## 2024-02-06 DIAGNOSIS — N18.31 STAGE 3A CHRONIC KIDNEY DISEASE (HCC): ICD-10-CM

## 2024-02-06 DIAGNOSIS — M85.89 OSTEOPENIA OF MULTIPLE SITES: ICD-10-CM

## 2024-02-06 DIAGNOSIS — J38.3 VOCAL CORD DYSFUNCTION: ICD-10-CM

## 2024-02-06 DIAGNOSIS — R73.01 IFG (IMPAIRED FASTING GLUCOSE): ICD-10-CM

## 2024-02-06 DIAGNOSIS — I10 PRIMARY HYPERTENSION: Primary | ICD-10-CM

## 2024-02-06 DIAGNOSIS — D50.9 IRON DEFICIENCY ANEMIA, UNSPECIFIED IRON DEFICIENCY ANEMIA TYPE: ICD-10-CM

## 2024-02-06 DIAGNOSIS — F32.9 DEPRESSION, REACTIVE: ICD-10-CM

## 2024-02-06 DIAGNOSIS — Z00.00 MEDICARE ANNUAL WELLNESS VISIT, SUBSEQUENT: ICD-10-CM

## 2024-02-06 DIAGNOSIS — I10 PRIMARY HYPERTENSION: ICD-10-CM

## 2024-02-06 LAB
ALBUMIN SERPL-MCNC: 3.6 G/DL (ref 3.5–5)
ALBUMIN/GLOB SERPL: 1.2 (ref 1.1–2.2)
ALP SERPL-CCNC: 84 U/L (ref 45–117)
ALT SERPL-CCNC: 19 U/L (ref 12–78)
ANION GAP SERPL CALC-SCNC: 5 MMOL/L (ref 5–15)
APPEARANCE UR: ABNORMAL
AST SERPL-CCNC: 17 U/L (ref 15–37)
BACTERIA URNS QL MICRO: NEGATIVE /HPF
BILIRUB SERPL-MCNC: 0.4 MG/DL (ref 0.2–1)
BILIRUB UR QL: NEGATIVE
BUN SERPL-MCNC: 32 MG/DL (ref 6–20)
BUN/CREAT SERPL: 29 (ref 12–20)
CALCIUM SERPL-MCNC: 8.9 MG/DL (ref 8.5–10.1)
CHLORIDE SERPL-SCNC: 106 MMOL/L (ref 97–108)
CHOLEST SERPL-MCNC: 221 MG/DL
CO2 SERPL-SCNC: 30 MMOL/L (ref 21–32)
COLOR UR: ABNORMAL
CREAT SERPL-MCNC: 1.1 MG/DL (ref 0.55–1.02)
EPITH CASTS URNS QL MICRO: ABNORMAL /LPF
EST. AVERAGE GLUCOSE BLD GHB EST-MCNC: 114 MG/DL
GLOBULIN SER CALC-MCNC: 3.1 G/DL (ref 2–4)
GLUCOSE SERPL-MCNC: 101 MG/DL (ref 65–100)
GLUCOSE UR STRIP.AUTO-MCNC: NEGATIVE MG/DL
HBA1C MFR BLD: 5.6 % (ref 4–5.6)
HDLC SERPL-MCNC: 61 MG/DL
HDLC SERPL: 3.6 (ref 0–5)
HGB UR QL STRIP: ABNORMAL
HYALINE CASTS URNS QL MICRO: ABNORMAL /LPF (ref 0–5)
KETONES UR QL STRIP.AUTO: NEGATIVE MG/DL
LDLC SERPL CALC-MCNC: 141.6 MG/DL (ref 0–100)
LEUKOCYTE ESTERASE UR QL STRIP.AUTO: ABNORMAL
NITRITE UR QL STRIP.AUTO: NEGATIVE
PH UR STRIP: 5.5 (ref 5–8)
POTASSIUM SERPL-SCNC: 3.5 MMOL/L (ref 3.5–5.1)
PROT SERPL-MCNC: 6.7 G/DL (ref 6.4–8.2)
PROT UR STRIP-MCNC: ABNORMAL MG/DL
RBC #/AREA URNS HPF: ABNORMAL /HPF (ref 0–5)
SODIUM SERPL-SCNC: 141 MMOL/L (ref 136–145)
SP GR UR REFRACTOMETRY: 1.01 (ref 1–1.03)
SPECIMEN HOLD: NORMAL
TRIGL SERPL-MCNC: 92 MG/DL
TSH SERPL DL<=0.05 MIU/L-ACNC: 1.08 UIU/ML (ref 0.36–3.74)
UROBILINOGEN UR QL STRIP.AUTO: 0.2 EU/DL (ref 0.2–1)
VLDLC SERPL CALC-MCNC: 18.4 MG/DL
WBC URNS QL MICRO: >100 /HPF (ref 0–4)

## 2024-02-06 PROCEDURE — G8427 DOCREV CUR MEDS BY ELIG CLIN: HCPCS | Performed by: INTERNAL MEDICINE

## 2024-02-06 PROCEDURE — 1036F TOBACCO NON-USER: CPT | Performed by: INTERNAL MEDICINE

## 2024-02-06 PROCEDURE — 99214 OFFICE O/P EST MOD 30 MIN: CPT | Performed by: INTERNAL MEDICINE

## 2024-02-06 PROCEDURE — 3078F DIAST BP <80 MM HG: CPT | Performed by: INTERNAL MEDICINE

## 2024-02-06 PROCEDURE — G8484 FLU IMMUNIZE NO ADMIN: HCPCS | Performed by: INTERNAL MEDICINE

## 2024-02-06 PROCEDURE — 1090F PRES/ABSN URINE INCON ASSESS: CPT | Performed by: INTERNAL MEDICINE

## 2024-02-06 PROCEDURE — 1123F ACP DISCUSS/DSCN MKR DOCD: CPT | Performed by: INTERNAL MEDICINE

## 2024-02-06 PROCEDURE — G8400 PT W/DXA NO RESULTS DOC: HCPCS | Performed by: INTERNAL MEDICINE

## 2024-02-06 PROCEDURE — G0439 PPPS, SUBSEQ VISIT: HCPCS | Performed by: INTERNAL MEDICINE

## 2024-02-06 PROCEDURE — 3074F SYST BP LT 130 MM HG: CPT | Performed by: INTERNAL MEDICINE

## 2024-02-06 PROCEDURE — G8419 CALC BMI OUT NRM PARAM NOF/U: HCPCS | Performed by: INTERNAL MEDICINE

## 2024-02-06 PROCEDURE — 3017F COLORECTAL CA SCREEN DOC REV: CPT | Performed by: INTERNAL MEDICINE

## 2024-02-06 RX ORDER — FLUTICASONE FUROATE 100 UG/1
POWDER RESPIRATORY (INHALATION)
COMMUNITY
Start: 2024-01-25 | End: 2024-02-06 | Stop reason: SDUPTHER

## 2024-02-06 RX ORDER — FLUTICASONE FUROATE 100 UG/1
POWDER RESPIRATORY (INHALATION)
Qty: 30 EACH | Refills: 2 | Status: SHIPPED | OUTPATIENT
Start: 2024-02-06

## 2024-02-06 RX ORDER — FLUTICASONE PROPIONATE 50 MCG
1 SPRAY, SUSPENSION (ML) NASAL DAILY
COMMUNITY

## 2024-02-06 ASSESSMENT — LIFESTYLE VARIABLES
HOW OFTEN DO YOU HAVE A DRINK CONTAINING ALCOHOL: 2-3 TIMES A WEEK
HOW MANY STANDARD DRINKS CONTAINING ALCOHOL DO YOU HAVE ON A TYPICAL DAY: 1 OR 2

## 2024-02-06 ASSESSMENT — PATIENT HEALTH QUESTIONNAIRE - PHQ9
SUM OF ALL RESPONSES TO PHQ QUESTIONS 1-9: 0
2. FEELING DOWN, DEPRESSED OR HOPELESS: 0
SUM OF ALL RESPONSES TO PHQ QUESTIONS 1-9: 0
1. LITTLE INTEREST OR PLEASURE IN DOING THINGS: 0
SUM OF ALL RESPONSES TO PHQ9 QUESTIONS 1 & 2: 0
SUM OF ALL RESPONSES TO PHQ QUESTIONS 1-9: 0
SUM OF ALL RESPONSES TO PHQ QUESTIONS 1-9: 0

## 2024-02-06 NOTE — PROGRESS NOTES
\"Have you been to the ER, urgent care clinic since your last visit?  Hospitalized since your last visit?\"    NO    “Have you seen or consulted any other health care providers outside of [facility] since your last visit?”    NO       Have you had a mammogram?”   NO

## 2024-02-07 RX ORDER — CIPROFLOXACIN 250 MG/1
250 TABLET, FILM COATED ORAL 2 TIMES DAILY
Qty: 6 TABLET | Refills: 0 | Status: SHIPPED | OUTPATIENT
Start: 2024-02-07 | End: 2024-02-10

## 2024-02-08 DIAGNOSIS — D50.9 IRON DEFICIENCY ANEMIA, UNSPECIFIED IRON DEFICIENCY ANEMIA TYPE: ICD-10-CM

## 2024-02-08 DIAGNOSIS — E78.2 MIXED HYPERLIPIDEMIA: ICD-10-CM

## 2024-02-08 DIAGNOSIS — N18.31 STAGE 3A CHRONIC KIDNEY DISEASE (HCC): Primary | ICD-10-CM

## 2024-02-08 DIAGNOSIS — N30.00 ACUTE CYSTITIS WITHOUT HEMATURIA: ICD-10-CM

## 2024-02-08 NOTE — RESULT ENCOUNTER NOTE
Called, Spoke with Pt  Received two pt identifiers  Pt informed per Dr. Jade Will treat UTI with Cipro twice daily 250 mg for 3 days--will need to repeat urine at follow-up due to blood in the urine  Lipids have climbed this is a change from last check we will repeat lipids bMP UA A1c 1 week prior to follow-up  Pt verbalizes understanding of the instructions and has no further questions at this time.

## 2024-02-12 LAB
ALBUMIN SERPL ELPH-MCNC: 3.5 G/DL (ref 2.9–4.4)
ALBUMIN/GLOB SERPL: 1.2 (ref 0.7–1.7)
ALPHA1 GLOB SERPL ELPH-MCNC: 0.2 G/DL (ref 0–0.4)
ALPHA2 GLOB SERPL ELPH-MCNC: 0.8 G/DL (ref 0.4–1)
B-GLOBULIN SERPL ELPH-MCNC: 1.1 G/DL (ref 0.7–1.3)
GAMMA GLOB SERPL ELPH-MCNC: 0.8 G/DL (ref 0.4–1.8)
GLOBULIN SER CALC-MCNC: 2.9 G/DL (ref 2.2–3.9)
M PROTEIN SERPL ELPH-MCNC: NORMAL G/DL
PROT SERPL-MCNC: 6.4 G/DL (ref 6–8.5)

## 2024-02-23 DIAGNOSIS — I10 HYPERTENSION, UNSPECIFIED TYPE: ICD-10-CM

## 2024-02-24 RX ORDER — LOSARTAN POTASSIUM AND HYDROCHLOROTHIAZIDE 25; 100 MG/1; MG/1
1 TABLET ORAL DAILY
Qty: 90 TABLET | Refills: 1 | Status: SHIPPED | OUTPATIENT
Start: 2024-02-24

## 2024-02-24 RX ORDER — AMLODIPINE BESYLATE 2.5 MG/1
2.5 TABLET ORAL DAILY
Qty: 90 TABLET | Refills: 1 | Status: SHIPPED | OUTPATIENT
Start: 2024-02-24

## 2024-03-30 RX ORDER — ALBUTEROL SULFATE 90 UG/1
AEROSOL, METERED RESPIRATORY (INHALATION)
Qty: 18 G | Refills: 3 | Status: SHIPPED | OUTPATIENT
Start: 2024-03-30

## 2024-04-18 DIAGNOSIS — J30.9 ALLERGIC RHINITIS, UNSPECIFIED SEASONALITY, UNSPECIFIED TRIGGER: ICD-10-CM

## 2024-04-18 RX ORDER — MONTELUKAST SODIUM 10 MG/1
10 TABLET ORAL DAILY
Qty: 30 TABLET | Refills: 2 | Status: SHIPPED | OUTPATIENT
Start: 2024-04-18

## 2024-05-29 RX ORDER — FLUTICASONE FUROATE 100 UG/1
POWDER RESPIRATORY (INHALATION)
Qty: 30 EACH | Refills: 2 | Status: SHIPPED | OUTPATIENT
Start: 2024-05-29

## 2024-07-18 DIAGNOSIS — J30.9 ALLERGIC RHINITIS, UNSPECIFIED SEASONALITY, UNSPECIFIED TRIGGER: ICD-10-CM

## 2024-07-18 RX ORDER — MONTELUKAST SODIUM 10 MG/1
10 TABLET ORAL DAILY
Qty: 30 TABLET | Refills: 2 | Status: SHIPPED | OUTPATIENT
Start: 2024-07-18

## 2024-07-22 RX ORDER — ALBUTEROL SULFATE 90 UG/1
2 AEROSOL, METERED RESPIRATORY (INHALATION) EVERY 6 HOURS PRN
Qty: 18 G | Refills: 3 | Status: SHIPPED | OUTPATIENT
Start: 2024-07-22

## 2024-07-22 NOTE — TELEPHONE ENCOUNTER
----- Message from Eun Vela sent at 12/7/2020 12:07 PM EST -----  Regarding: Dr. Myra Gordon.  Telephone  Caller (if not patient):  Relationship of caller (if not patient):  Best contact number(s):  Name of medication and dosage if known: albuterol (PROVENTIL VENTOLIN) 2.5 mg /3 mL (0.083 %) nebulizer solution      losartan-hydroCHLOROthiazide (HYZAAR) 100-25 mg per tablet      montelukast (SINGULAIR) 10 mg tablet    amLODIPine (NORVASC) 2.5 mg tablet  Is patient out of this medication (yes/no): no   Pharmacy name: [address] [place] [address] listed in chart? (yes/no): yes   Pharmacy phone number:  Date of last visit:  05/05/20  Details to clarify the request: Patient is requesting refills and has sent a request in 3 times     Message from [facility] ORTHOPEDIC OUTPATIENT VISIT     HPI:    Chief Complaint   Patient presents with    Left Knee - Follow-up    Office Visit         31 year old female   presenting today about 9 months status post ACL reconstruction left knee.  Patient doing well up until 1 week ago where she started reporting more pain over the IT band.  She states that she started playing kickball and was using ACL stabilizing knee brace.  She did not report any injury.  She reports no clicking locking or popping within the knee joint.  She has tried applying ice to this area which provided some improvement.        Past Medical History:   Diagnosis Date    Essential (primary) hypertension     Exercise-induced asthma (CMD)     in high school; has not used inhaler in 3-4 years        Past Surgical History:   Procedure Laterality Date    Anterior cruciate ligament repair Left 10/23/2023    with hamstring allograft  Dr. Vasquez; [facility]    No past surgeries          Family History   Problem Relation Age of Onset    Coronary Artery Disease Mother     Myocardial Infarction Mother     Heart disease Mother 54        had MI     Myocardial Infarction Other     Cancer, Breast Other     Cancer, Colon Other     Heart disease Father     Hyperlipidemia Father     Hypertension Father     Patient is unaware of any medical problems Sister     Patient is unaware of any medical problems Brother     Patient is unaware of any medical problems Brother     Stroke Maternal Grandmother     Cancer, Breast Maternal Grandmother     Cancer, Colon Maternal Grandmother     Heart disease Maternal Grandfather     COPD Paternal Grandmother     Asthma Paternal Grandmother     Myocardial Infarction Paternal Grandfather        Social History     Socioeconomic History    Marital status: Single     Spouse name: Not on file    Number of children: Not on file    Years of education: Not on file    Highest education level: Not on file   Occupational History    Not on file    Tobacco Use    Smoking status: Never    Smokeless tobacco: Never   Vaping Use    Vaping status: never used   Substance and Sexual Activity    Alcohol use: Yes     Alcohol/week: 10.0 standard drinks of alcohol     Types: 10 Glasses of wine per week     Comment: socially     Drug use: No    Sexual activity: Yes     Partners: Male     Birth control/protection: I.U.D.   Other Topics Concern    Not on file   Social History Narrative    Not on file     Social Determinants of Health     Financial Resource Strain: Low Risk  (9/29/2023)    Financial Resource Strain     Unable to Get: None   Food Insecurity: Not At Risk (9/29/2023)    Food Insecurity     Food Insecurity: Worried or Stressed about Money for Food: Never   Transportation Needs: Not At Risk (9/29/2023)    PRAPARE - Transportation     Lack of Transportation (Medical): No     Lack of Transportation (Non-Medical): No   Physical Activity: Medium Risk (9/29/2023)    Exercise Vital Sign     Days of Exercise per Week: 1 day     Minutes of Exercise per Session: 60 min   Stress: High Risk (9/29/2023)    Stress     How Stressed: Quite a bit   Social Connections: Medium Risk (9/29/2023)    Social Connections     Social Connectivity: 1 or 2 times a week   Interpersonal Safety: Not At Risk (9/29/2023)    Interpersonal Safety     Social Determinants: Intimate Partner Violence Past Fear: No     Social Determinants: Intimate Partner Violence Current Fear: No       Prior to Admission medications    Medication Sig Start Date End Date Taking? Authorizing Provider   naproxen (NAPROSYN) 500 MG tablet Take 1 tablet by mouth in the morning and 1 tablet in the evening. Take with meals. 7/22/24   Gissel Vasquez DO Wegovy 0.5 MG/0.5ML injection INJECT 0.5 MG SUBCUTANEOUSLY EVERY WEEK FOR WEIGHT LOSS 6/7/24   Provider, Outside   Cetirizine-Pseudoephedrine (ZYRTEC-D PO)     Provider, Outside   methylPREDNISolone (MEDROL DOSEPAK) 4 MG tablet See package instructions. 7/15/24   Jonathon  Erinn MENDOZA MD   lisinopril (ZESTRIL) 5 MG tablet Take 1 tablet by mouth daily. 7/15/24   Erinn Holt MD   hydroCHLOROthiazide (HYDRODIURIL) 25 MG tablet TAKE 1 TABLET BY MOUTH DAILY 10/13/23   Erinn Holt MD   albuterol 108 (90 Base) MCG/ACT inhaler Inhale 2 puffs into the lungs every 4 hours as needed for Shortness of Breath or Wheezing. 9/29/23   Erinn Holt MD   betamethasone dipropionate (DIPROSONE) 0.05 % cream Apply 1 application. topically in the morning and 1 application. in the evening.  Patient not taking: Reported on 4/10/2024 4/27/23   Erinn Holt MD   levonorgestrel (Danyell) 13.5 MG intrauterine device 13.5 mg by Intrauterine route every 3 years. 10/18/21   Provider, Outside       ALLERGIES:  No Known Allergies          ROS:        14 point review of systems performed all systems reviewed and are negative  Musculoskeletal: see HPI,  Denies chest pain, denies shortness of breath, denies nausea or vomiting dizziness, denies fevers or chills    Constitutional:  negative for chills and fevers  Integument: negative for rash and skin color change  Musculoskeletal:  negative except for what is in HPI  Neurological: negative for paresthesia    Visit Vitals  Ht 5' 8.5\" (1.74 m)   Wt 98.9 kg (218 lb)   LMP 04/20/2024 (Exact Date) Comment: HAS DANYELL IUD   BMI 32.66 kg/m²         PHYSICAL EXAM:      GENERAL:   Pt in NAD, alert and oriented x3    HEENT:    Normocephalic, atraumatic, nares patent, non-erythematous    CV:    Regular rate    LUNGS:   Normal respirations    ABDOMEN:   Soft, nontender, nondistended      Left Knee Exam     Range of Motion   Extension:  0   Flexion:  120     Other   Erythema: absent  Sensation: normal  Pulse: present  Swelling: none  Effusion: no effusion present    Comments:  Pain to palpation directly along the IT band.                   IMPRESSION:    Rupture of anterior cruciate ligament of left knee, subsequent encounter  (primary encounter  diagnosis)    Iliotibial band syndrome of left side             PLAN:    Based on examination I feel the patient is having some tenderness over the iliotibial band we did go over IT band stretching exercises.  Will place her on naproxen 500 mg to be used 1 tablet twice daily with food for the next 2 weeks.  We did go over IT band stretching using a foam roller or tennis ball as well.      Patient may follow-up if no long-term improvement    Orders Placed This Encounter    naproxen (NAPROSYN) 500 MG tablet        Return if symptoms worsen or fail to improve.       Gissel Vasquez DO  1:08 PM  7/22/2024

## 2024-07-30 NOTE — PROGRESS NOTES
HISTORY OF PRESENT ILLNESS  Carolee Boucher is a 73 y.o. female.  HPI    Last here 2/6/24. Pt is here to f/u on chronic conditions.     Has a history of hypertension  BP today is 118/67  BP at home 116-120/65-70  Continues on losartan-HCTZ 100-25mg and norvasc 2.5mg daily      Wt today is 142 lbs, stable since lov  Discussed continued diet and w/l      Reviewed labs  Ordered pre-labs     Notes she has seen Dr. Henry (heme) 3 times for anemia chronic anemia with normal ferritin and iron panel  Last there 4/23, he ran labs, f/u PRN for labs last note said follow-up in 1 year will repeat labs and see if she needs to go back  He is monitoring, no meds needed  Low iron but no intervention needed      Mild CKD with creatinine running 1.1-1.2   Most recent 2/23 1.10  Will monitor        Saw Dr. Quinn dermatology 6/22, follows annually she is due for follow-up, reminded      Saw Dr Sheehan (uro) for recurrent UTIs and microscopic hematuria also saw the urologist Dr. Victoria multiple times, lov 7/24  Had US of kidneys to evaluate cyst x 10 yrs, pt reports nl  Scanned in -- Reviewed note 4/24  Following up after renal mass, CT protocol, underwent US ?  Simple cysts, no additional intervention needed      Ultimately she completed the renal ultrasound could not tolerate the CT due to contrast dye allergy         she saw Dr. Del Cid (pulm) for cough   Last there recently ~ 7/22 - he referred her to Dr Ta (allergist) so she follows with Dr. Ta now was last there around May 2024  Continues singulair daily   Also on arnuity, not on flovent    She also has albuterol to use prn for breathing - not needed recently   Also uses a generic Allegra  Recall advair caused a rash      Seeing Dr Ta (allergy) for cough, lov 5/31/24 - states she was diagnosed with vocal chord dysfunction  She had been struggling with a cough triggered by cold weather and strong smells   Taking atrovent nasal spray which is helping significantly      She

## 2024-08-06 ENCOUNTER — OFFICE VISIT (OUTPATIENT)
Age: 74
End: 2024-08-06
Payer: MEDICARE

## 2024-08-06 VITALS
TEMPERATURE: 98 F | WEIGHT: 142 LBS | DIASTOLIC BLOOD PRESSURE: 67 MMHG | SYSTOLIC BLOOD PRESSURE: 118 MMHG | BODY MASS INDEX: 26.13 KG/M2 | OXYGEN SATURATION: 98 % | HEART RATE: 69 BPM | HEIGHT: 62 IN | RESPIRATION RATE: 18 BRPM

## 2024-08-06 DIAGNOSIS — E78.2 MIXED HYPERLIPIDEMIA: ICD-10-CM

## 2024-08-06 DIAGNOSIS — Z23 NEED FOR PROPHYLACTIC VACCINATION AGAINST STREPTOCOCCUS PNEUMONIAE (PNEUMOCOCCUS): ICD-10-CM

## 2024-08-06 DIAGNOSIS — R73.01 IFG (IMPAIRED FASTING GLUCOSE): ICD-10-CM

## 2024-08-06 DIAGNOSIS — D50.9 IRON DEFICIENCY ANEMIA, UNSPECIFIED IRON DEFICIENCY ANEMIA TYPE: ICD-10-CM

## 2024-08-06 DIAGNOSIS — I10 PRIMARY HYPERTENSION: Primary | ICD-10-CM

## 2024-08-06 DIAGNOSIS — R31.21 ASYMPTOMATIC MICROSCOPIC HEMATURIA: ICD-10-CM

## 2024-08-06 DIAGNOSIS — J45.20 MILD INTERMITTENT ASTHMA, UNSPECIFIED WHETHER COMPLICATED: ICD-10-CM

## 2024-08-06 DIAGNOSIS — N18.31 STAGE 3A CHRONIC KIDNEY DISEASE (HCC): ICD-10-CM

## 2024-08-06 DIAGNOSIS — F32.9 DEPRESSION, REACTIVE: ICD-10-CM

## 2024-08-06 LAB
ALBUMIN SERPL-MCNC: 3.8 G/DL (ref 3.5–5)
ALBUMIN/GLOB SERPL: 1.1 (ref 1.1–2.2)
ALP SERPL-CCNC: 94 U/L (ref 45–117)
ALT SERPL-CCNC: 18 U/L (ref 12–78)
ANION GAP SERPL CALC-SCNC: 3 MMOL/L (ref 5–15)
AST SERPL-CCNC: 13 U/L (ref 15–37)
BILIRUB SERPL-MCNC: 0.3 MG/DL (ref 0.2–1)
BUN SERPL-MCNC: 27 MG/DL (ref 6–20)
BUN/CREAT SERPL: 22 (ref 12–20)
CALCIUM SERPL-MCNC: 9.5 MG/DL (ref 8.5–10.1)
CHLORIDE SERPL-SCNC: 109 MMOL/L (ref 97–108)
CHOLEST SERPL-MCNC: 228 MG/DL
CO2 SERPL-SCNC: 31 MMOL/L (ref 21–32)
CREAT SERPL-MCNC: 1.21 MG/DL (ref 0.55–1.02)
ERYTHROCYTE [DISTWIDTH] IN BLOOD BY AUTOMATED COUNT: 13.2 % (ref 11.5–14.5)
EST. AVERAGE GLUCOSE BLD GHB EST-MCNC: 117 MG/DL
GLOBULIN SER CALC-MCNC: 3.4 G/DL (ref 2–4)
GLUCOSE SERPL-MCNC: 96 MG/DL (ref 65–100)
HBA1C MFR BLD: 5.7 % (ref 4–5.6)
HCT VFR BLD AUTO: 32.2 % (ref 35–47)
HDLC SERPL-MCNC: 56 MG/DL
HDLC SERPL: 4.1 (ref 0–5)
HGB BLD-MCNC: 9.9 G/DL (ref 11.5–16)
LDLC SERPL CALC-MCNC: 136 MG/DL (ref 0–100)
MCH RBC QN AUTO: 28.8 PG (ref 26–34)
MCHC RBC AUTO-ENTMCNC: 30.7 G/DL (ref 30–36.5)
MCV RBC AUTO: 93.6 FL (ref 80–99)
NRBC # BLD: 0 K/UL (ref 0–0.01)
NRBC BLD-RTO: 0 PER 100 WBC
PLATELET # BLD AUTO: 387 K/UL (ref 150–400)
PMV BLD AUTO: 9.3 FL (ref 8.9–12.9)
POTASSIUM SERPL-SCNC: 3.9 MMOL/L (ref 3.5–5.1)
PROT SERPL-MCNC: 7.2 G/DL (ref 6.4–8.2)
RBC # BLD AUTO: 3.44 M/UL (ref 3.8–5.2)
SODIUM SERPL-SCNC: 143 MMOL/L (ref 136–145)
TRIGL SERPL-MCNC: 180 MG/DL
TSH SERPL DL<=0.05 MIU/L-ACNC: 1 UIU/ML (ref 0.36–3.74)
VLDLC SERPL CALC-MCNC: 36 MG/DL
WBC # BLD AUTO: 8.2 K/UL (ref 3.6–11)

## 2024-08-06 PROCEDURE — PBSHW PNEUMOCOCCAL, PCV20, PREVNAR 20, (AGE 6W+), IM, PF: Performed by: INTERNAL MEDICINE

## 2024-08-06 PROCEDURE — 1036F TOBACCO NON-USER: CPT | Performed by: INTERNAL MEDICINE

## 2024-08-06 PROCEDURE — 3017F COLORECTAL CA SCREEN DOC REV: CPT | Performed by: INTERNAL MEDICINE

## 2024-08-06 PROCEDURE — 99214 OFFICE O/P EST MOD 30 MIN: CPT | Performed by: INTERNAL MEDICINE

## 2024-08-06 PROCEDURE — G8427 DOCREV CUR MEDS BY ELIG CLIN: HCPCS | Performed by: INTERNAL MEDICINE

## 2024-08-06 PROCEDURE — G8400 PT W/DXA NO RESULTS DOC: HCPCS | Performed by: INTERNAL MEDICINE

## 2024-08-06 PROCEDURE — 90677 PCV20 VACCINE IM: CPT | Performed by: INTERNAL MEDICINE

## 2024-08-06 PROCEDURE — 3074F SYST BP LT 130 MM HG: CPT | Performed by: INTERNAL MEDICINE

## 2024-08-06 PROCEDURE — 1123F ACP DISCUSS/DSCN MKR DOCD: CPT | Performed by: INTERNAL MEDICINE

## 2024-08-06 PROCEDURE — 1090F PRES/ABSN URINE INCON ASSESS: CPT | Performed by: INTERNAL MEDICINE

## 2024-08-06 PROCEDURE — G8419 CALC BMI OUT NRM PARAM NOF/U: HCPCS | Performed by: INTERNAL MEDICINE

## 2024-08-06 PROCEDURE — 3078F DIAST BP <80 MM HG: CPT | Performed by: INTERNAL MEDICINE

## 2024-08-06 ASSESSMENT — PATIENT HEALTH QUESTIONNAIRE - PHQ9
SUM OF ALL RESPONSES TO PHQ QUESTIONS 1-9: 0
SUM OF ALL RESPONSES TO PHQ QUESTIONS 1-9: 0
SUM OF ALL RESPONSES TO PHQ9 QUESTIONS 1 & 2: 0
SUM OF ALL RESPONSES TO PHQ QUESTIONS 1-9: 0
1. LITTLE INTEREST OR PLEASURE IN DOING THINGS: NOT AT ALL
2. FEELING DOWN, DEPRESSED OR HOPELESS: NOT AT ALL
SUM OF ALL RESPONSES TO PHQ QUESTIONS 1-9: 0

## 2024-08-06 NOTE — PROGRESS NOTES
\"Have you been to the ER, urgent care clinic since your last visit?  Hospitalized since your last visit?\"    NO    “Have you seen or consulted any other health care providers outside of [facility] since your last visit?”    NO    Have you had a mammogram?”   NO    Date of last Mammogram: 9/30/2021             Click Here for Release of Records Request

## 2024-08-07 NOTE — RESULT ENCOUNTER NOTE
Called, Spoke with Pt  Received two pt identifiers  Pt informed per Dr. Jade Progressive anemia, schedule f/u w jacquie  Pt informed per Dr. Jade diet for lipids  Rest labs are okay   Pt verbalizes understanding of the instructions and has no further questions at this time.

## 2024-08-24 DIAGNOSIS — I10 HYPERTENSION, UNSPECIFIED TYPE: ICD-10-CM

## 2024-08-26 RX ORDER — AMLODIPINE BESYLATE 2.5 MG/1
2.5 TABLET ORAL DAILY
Qty: 90 TABLET | Refills: 1 | Status: SHIPPED | OUTPATIENT
Start: 2024-08-26

## 2024-10-17 DIAGNOSIS — J30.9 ALLERGIC RHINITIS, UNSPECIFIED SEASONALITY, UNSPECIFIED TRIGGER: ICD-10-CM

## 2024-10-17 RX ORDER — MONTELUKAST SODIUM 10 MG/1
10 TABLET ORAL DAILY
Qty: 30 TABLET | Refills: 2 | Status: SHIPPED | OUTPATIENT
Start: 2024-10-17

## 2024-12-16 RX ORDER — LOSARTAN POTASSIUM AND HYDROCHLOROTHIAZIDE 25; 100 MG/1; MG/1
1 TABLET ORAL DAILY
Qty: 90 TABLET | Refills: 0 | Status: SHIPPED | OUTPATIENT
Start: 2024-12-16

## 2025-01-16 DIAGNOSIS — J30.9 ALLERGIC RHINITIS, UNSPECIFIED SEASONALITY, UNSPECIFIED TRIGGER: ICD-10-CM

## 2025-01-16 RX ORDER — MONTELUKAST SODIUM 10 MG/1
10 TABLET ORAL DAILY
Qty: 30 TABLET | Refills: 2 | Status: SHIPPED | OUTPATIENT
Start: 2025-01-16

## 2025-02-08 NOTE — PROGRESS NOTES
HISTORY OF PRESENT ILLNESS  Carolee Boucher is a 74 y.o. female.  HPI  Last here 8/6/24. Pt is here to f/u on chronic conditions.     She is going to [place] for a month in 6/25     Has a history of hypertension  BP today is 102/61  BP at home: reports similar to in office today 105-108/60s   Continues on losartan-HCTZ 100-25mg and norvasc 2.5mg daily   Will go ahead and stop norvasc 2.5mg     Wt today is 139 lbs, stable since lov  Discussed continued diet and w/l      Reviewed labs  Ordered pre-labs     Notes she has seen Dr. Henry (hem/onc) for chronic anemia with normal ferritin and iron panel  Last there 4/23, f/u PRN, labs were worse and she was supposed to schedule f/u but she did not, discussed   Low iron but no intervention needed      Mild CKD with creatinine running 1.1-1.2   Most recent 2/23 1.10  Will monitor        Saw Dr. Quinn dermatology 6/22, follows annually she is due for follow-up, reminded        Saw Dr Sheehan (uro) for recurrent UTIs and microscopic hematuria also saw the urologist Dr. Victoria multiple times, lov 7/24  Had US of kidneys to evaluate cyst x 10 yrs, pt reports nl      Ultimately she completed the renal ultrasound could not tolerate the CT due to contrast dye allergy            she saw Dr. Del Cid (pulm) for cough, now follows with allergist   Last there recently ~ 7/22  Continues singulair daily   Also on arnuity, not on flovent    She also has albuterol to use prn for breathing - not needed recently   Also uses a generic Allegra  Recall advair caused a rash      Seeing Dr Ta (allergy) for cough, lov 5/31/24 - states she was diagnosed with vocal chord dysfunction  She had been struggling with a cough triggered by cold weather and strong smells   Taking atrovent nasal spray which is helping significantly   Also on arnuity inhaler      She had thumb surgery with Dr. Yoder (ortho) for de quervain's tenosynovitis  Has not needed to f/U recently       She saw Dr James (ortho) 6/21/24 
taking: Reported on 2/6/2024  Adenike Jade MD   albuterol sulfate HFA (PROVENTIL;VENTOLIN;PROAIR) 108 (90 Base) MCG/ACT inhaler INHALE 2 PUFFS BY MOUTH EVERY 4 HOURS AS NEEDED FOR WHEEZING  Automatic Reconciliation, Ar       CareTeam (Including outside providers/suppliers regularly involved in providing care):   Patient Care Team:  Adenike Jade MD as PCP - General  Adenike Jade MD as PCP - Empaneled Provider  Dakotah Ta MD (Allergy and Immunology)  Mariah Sheehan MD (Female Pelvic Medicine and Reconstructive Surgery)     Recommendations for Preventive Services Due: see orders and patient instructions/AVS.  Recommended screening schedule for the next 5-10 years is provided to the patient in written form: see Patient Instructions/AVS.     Reviewed and updated this visit:  Tobacco  Allergies  Meds  Med Hx  Surg Hx  Fam Hx  Sexual Hx       Pt lives with her   No safety equipment     ACP not on file. SDM is her .  Pt has this information at home and will bring it in.      :    Colonoscopy: 11/19/21 Dr. Smith 5 year repeat  Pap:  3/23 w/ Dr. Ta -- reminded to schedule   Mammogram: 3/23 [facility] nl, due 3/24, will schedule  DEXA: 9/30/21 osteopenia [facility], due  reminded, she will schedule with mammo  AAA: no FMHX    Tdap: 5/13/2013, due discussed local pharmacy   Pneumovax: 10/23/17  Ahlmcwl97: 10/18/2016  Prevnar 20: 8/6/24  Zostavax: 9/25/2015  Shingrix: completed both in 2019  Flu shot: 10/24     A1c:   8/24 5.7  due    Eye exam: Dr. Sharif 12/24, annual    Lipids: LDL 8/24 136 annual    EKG: 10/23/17, nsr  AAA: 5/19, negative with CT abd     COVID: 5 doses (moderna) + 1 Pfizer, most recent 10/24  RSV: discussed          Medication reconciliation completed by MA and reviewed by me.  Medical/surgical/social/family history reviewed and updated by me.  Patient provided AVS and preventative screening table.  Patient verbalized understanding of all information discussed.

## 2025-02-08 NOTE — PATIENT INSTRUCTIONS
attack. These may include:    Chest pain or pressure, or a strange feeling in the chest.     Sweating.     Shortness of breath.     Pain, pressure, or a strange feeling in the back, neck, jaw, or upper belly or in one or both shoulders or arms.     Lightheadedness or sudden weakness.     A fast or irregular heartbeat.   After you call 911, the  may tell you to chew 1 adult-strength or 2 to 4 low-dose aspirin. Wait for an ambulance. Do not try to drive yourself.  Watch closely for changes in your health, and be sure to contact your doctor if you have any problems.  Where can you learn more?  Go to https://www.[employer].net/patientEd and enter F075 to learn more about \"A Healthy Heart: Care Instructions.\"  Current as of: July 31, 2024  Content Version: 14.3  © 2024 [employer].   Care instructions adapted under license by [employer]. If you have questions about a medical condition or this instruction, always ask your healthcare professional. [employer], [email or web address], disclaims any warranty or liability for your use of this information.    Personalized Preventive Plan for Carolee Boucher - 2/10/2025  Medicare offers a range of preventive health benefits. Some of the tests and screenings are paid in full while other may be subject to a deductible, co-insurance, and/or copay.  Some of these benefits include a comprehensive review of your medical history including lifestyle, illnesses that may run in your family, and various assessments and screenings as appropriate.  After reviewing your medical record and screening and assessments performed today your provider may have ordered immunizations, labs, imaging, and/or referrals for you.  A list of these orders (if applicable) as well as your Preventive Care list are included within your After Visit Summary for your review.

## 2025-02-10 ENCOUNTER — OFFICE VISIT (OUTPATIENT)
Age: 75
End: 2025-02-10
Payer: MEDICARE

## 2025-02-10 VITALS
WEIGHT: 139 LBS | HEIGHT: 62 IN | RESPIRATION RATE: 16 BRPM | HEART RATE: 74 BPM | SYSTOLIC BLOOD PRESSURE: 102 MMHG | BODY MASS INDEX: 25.58 KG/M2 | DIASTOLIC BLOOD PRESSURE: 61 MMHG | OXYGEN SATURATION: 100 % | TEMPERATURE: 98 F

## 2025-02-10 DIAGNOSIS — I10 PRIMARY HYPERTENSION: ICD-10-CM

## 2025-02-10 DIAGNOSIS — M85.89 OSTEOPENIA OF MULTIPLE SITES: ICD-10-CM

## 2025-02-10 DIAGNOSIS — J45.20 MILD INTERMITTENT ASTHMA, UNSPECIFIED WHETHER COMPLICATED: ICD-10-CM

## 2025-02-10 DIAGNOSIS — E78.2 MIXED HYPERLIPIDEMIA: ICD-10-CM

## 2025-02-10 DIAGNOSIS — I10 PRIMARY HYPERTENSION: Primary | ICD-10-CM

## 2025-02-10 DIAGNOSIS — D50.9 IRON DEFICIENCY ANEMIA, UNSPECIFIED IRON DEFICIENCY ANEMIA TYPE: ICD-10-CM

## 2025-02-10 DIAGNOSIS — N18.31 STAGE 3A CHRONIC KIDNEY DISEASE (HCC): ICD-10-CM

## 2025-02-10 DIAGNOSIS — Z00.00 MEDICARE ANNUAL WELLNESS VISIT, SUBSEQUENT: ICD-10-CM

## 2025-02-10 DIAGNOSIS — R73.01 IFG (IMPAIRED FASTING GLUCOSE): ICD-10-CM

## 2025-02-10 DIAGNOSIS — N39.0 RECURRENT UTI: ICD-10-CM

## 2025-02-10 DIAGNOSIS — Z12.39 BREAST SCREENING: ICD-10-CM

## 2025-02-10 DIAGNOSIS — F32.9 DEPRESSION, REACTIVE: ICD-10-CM

## 2025-02-10 LAB
ANION GAP SERPL CALC-SCNC: 5 MMOL/L (ref 2–12)
BUN SERPL-MCNC: 30 MG/DL (ref 6–20)
BUN/CREAT SERPL: 27 (ref 12–20)
CALCIUM SERPL-MCNC: 9.2 MG/DL (ref 8.5–10.1)
CHLORIDE SERPL-SCNC: 107 MMOL/L (ref 97–108)
CHOLEST SERPL-MCNC: 206 MG/DL
CO2 SERPL-SCNC: 27 MMOL/L (ref 21–32)
CREAT SERPL-MCNC: 1.13 MG/DL (ref 0.55–1.02)
ERYTHROCYTE [DISTWIDTH] IN BLOOD BY AUTOMATED COUNT: 12.6 % (ref 11.5–14.5)
EST. AVERAGE GLUCOSE BLD GHB EST-MCNC: 114 MG/DL
GLUCOSE SERPL-MCNC: 104 MG/DL (ref 65–100)
HBA1C MFR BLD: 5.6 % (ref 4–5.6)
HCT VFR BLD AUTO: 30.1 % (ref 35–47)
HDLC SERPL-MCNC: 58 MG/DL
HDLC SERPL: 3.6 (ref 0–5)
HGB BLD-MCNC: 9.5 G/DL (ref 11.5–16)
LDLC SERPL CALC-MCNC: 126 MG/DL (ref 0–100)
MCH RBC QN AUTO: 28.7 PG (ref 26–34)
MCHC RBC AUTO-ENTMCNC: 31.6 G/DL (ref 30–36.5)
MCV RBC AUTO: 90.9 FL (ref 80–99)
NRBC # BLD: 0 K/UL (ref 0–0.01)
NRBC BLD-RTO: 0 PER 100 WBC
PLATELET # BLD AUTO: 277 K/UL (ref 150–400)
PMV BLD AUTO: 10.1 FL (ref 8.9–12.9)
POTASSIUM SERPL-SCNC: 3.4 MMOL/L (ref 3.5–5.1)
RBC # BLD AUTO: 3.31 M/UL (ref 3.8–5.2)
SODIUM SERPL-SCNC: 139 MMOL/L (ref 136–145)
TRIGL SERPL-MCNC: 110 MG/DL
TSH SERPL DL<=0.05 MIU/L-ACNC: 0.59 UIU/ML (ref 0.36–3.74)
VLDLC SERPL CALC-MCNC: 22 MG/DL
WBC # BLD AUTO: 8.6 K/UL (ref 3.6–11)

## 2025-02-10 PROCEDURE — 99214 OFFICE O/P EST MOD 30 MIN: CPT | Performed by: INTERNAL MEDICINE

## 2025-02-10 SDOH — ECONOMIC STABILITY: FOOD INSECURITY: WITHIN THE PAST 12 MONTHS, YOU WORRIED THAT YOUR FOOD WOULD RUN OUT BEFORE YOU GOT MONEY TO BUY MORE.: NEVER TRUE

## 2025-02-10 SDOH — ECONOMIC STABILITY: FOOD INSECURITY: WITHIN THE PAST 12 MONTHS, THE FOOD YOU BOUGHT JUST DIDN'T LAST AND YOU DIDN'T HAVE MONEY TO GET MORE.: NEVER TRUE

## 2025-02-10 ASSESSMENT — PATIENT HEALTH QUESTIONNAIRE - PHQ9
SUM OF ALL RESPONSES TO PHQ9 QUESTIONS 1 & 2: 0
SUM OF ALL RESPONSES TO PHQ QUESTIONS 1-9: 0
4. FEELING TIRED OR HAVING LITTLE ENERGY: NOT AT ALL
5. POOR APPETITE OR OVEREATING: NOT AT ALL
6. FEELING BAD ABOUT YOURSELF - OR THAT YOU ARE A FAILURE OR HAVE LET YOURSELF OR YOUR FAMILY DOWN: NOT AT ALL
7. TROUBLE CONCENTRATING ON THINGS, SUCH AS READING THE NEWSPAPER OR WATCHING TELEVISION: NOT AT ALL
SUM OF ALL RESPONSES TO PHQ QUESTIONS 1-9: 0
2. FEELING DOWN, DEPRESSED OR HOPELESS: NOT AT ALL
10. IF YOU CHECKED OFF ANY PROBLEMS, HOW DIFFICULT HAVE THESE PROBLEMS MADE IT FOR YOU TO DO YOUR WORK, TAKE CARE OF THINGS AT HOME, OR GET ALONG WITH OTHER PEOPLE: NOT DIFFICULT AT ALL
SUM OF ALL RESPONSES TO PHQ QUESTIONS 1-9: 0
3. TROUBLE FALLING OR STAYING ASLEEP: NOT AT ALL
9. THOUGHTS THAT YOU WOULD BE BETTER OFF DEAD, OR OF HURTING YOURSELF: NOT AT ALL
SUM OF ALL RESPONSES TO PHQ QUESTIONS 1-9: 0
1. LITTLE INTEREST OR PLEASURE IN DOING THINGS: NOT AT ALL
8. MOVING OR SPEAKING SO SLOWLY THAT OTHER PEOPLE COULD HAVE NOTICED. OR THE OPPOSITE, BEING SO FIGETY OR RESTLESS THAT YOU HAVE BEEN MOVING AROUND A LOT MORE THAN USUAL: NOT AT ALL

## 2025-02-10 ASSESSMENT — LIFESTYLE VARIABLES
HOW OFTEN DO YOU HAVE A DRINK CONTAINING ALCOHOL: NEVER
HOW MANY STANDARD DRINKS CONTAINING ALCOHOL DO YOU HAVE ON A TYPICAL DAY: PATIENT DOES NOT DRINK

## 2025-03-17 RX ORDER — LOSARTAN POTASSIUM AND HYDROCHLOROTHIAZIDE 25; 100 MG/1; MG/1
1 TABLET ORAL DAILY
Qty: 90 TABLET | Refills: 0 | Status: SHIPPED | OUTPATIENT
Start: 2025-03-17

## 2025-04-19 DIAGNOSIS — J30.9 ALLERGIC RHINITIS, UNSPECIFIED SEASONALITY, UNSPECIFIED TRIGGER: ICD-10-CM

## 2025-04-20 RX ORDER — MONTELUKAST SODIUM 10 MG/1
10 TABLET ORAL DAILY
Qty: 30 TABLET | Refills: 2 | Status: SHIPPED | OUTPATIENT
Start: 2025-04-20

## 2025-04-28 RX ORDER — ALBUTEROL SULFATE 90 UG/1
2 AEROSOL, METERED RESPIRATORY (INHALATION) EVERY 6 HOURS PRN
Qty: 18 G | Refills: 3 | Status: SHIPPED | OUTPATIENT
Start: 2025-04-28

## 2025-06-18 RX ORDER — LOSARTAN POTASSIUM AND HYDROCHLOROTHIAZIDE 25; 100 MG/1; MG/1
1 TABLET ORAL DAILY
Qty: 90 TABLET | Refills: 0 | Status: SHIPPED | OUTPATIENT
Start: 2025-06-18

## 2025-08-02 DIAGNOSIS — J30.9 ALLERGIC RHINITIS, UNSPECIFIED SEASONALITY, UNSPECIFIED TRIGGER: ICD-10-CM

## 2025-08-06 RX ORDER — MONTELUKAST SODIUM 10 MG/1
10 TABLET ORAL DAILY
Qty: 90 TABLET | Refills: 0 | Status: SHIPPED | OUTPATIENT
Start: 2025-08-06

## 2025-08-07 RX ORDER — LOSARTAN POTASSIUM AND HYDROCHLOROTHIAZIDE 25; 100 MG/1; MG/1
1 TABLET ORAL DAILY
Qty: 30 TABLET | Refills: 0 | Status: SHIPPED | OUTPATIENT
Start: 2025-08-07

## 2025-08-12 ENCOUNTER — OFFICE VISIT (OUTPATIENT)
Age: 75
End: 2025-08-12
Payer: MEDICARE

## 2025-08-12 VITALS
OXYGEN SATURATION: 95 % | TEMPERATURE: 96.9 F | SYSTOLIC BLOOD PRESSURE: 124 MMHG | RESPIRATION RATE: 16 BRPM | HEIGHT: 62 IN | HEART RATE: 51 BPM | WEIGHT: 147.5 LBS | BODY MASS INDEX: 27.14 KG/M2 | DIASTOLIC BLOOD PRESSURE: 78 MMHG

## 2025-08-12 DIAGNOSIS — I10 PRIMARY HYPERTENSION: Primary | ICD-10-CM

## 2025-08-12 DIAGNOSIS — E78.2 MIXED HYPERLIPIDEMIA: ICD-10-CM

## 2025-08-12 DIAGNOSIS — J45.20 MILD INTERMITTENT ASTHMA, UNSPECIFIED WHETHER COMPLICATED: ICD-10-CM

## 2025-08-12 DIAGNOSIS — M54.32 SCIATICA OF LEFT SIDE: ICD-10-CM

## 2025-08-12 DIAGNOSIS — R73.01 IFG (IMPAIRED FASTING GLUCOSE): ICD-10-CM

## 2025-08-12 DIAGNOSIS — D50.9 IRON DEFICIENCY ANEMIA, UNSPECIFIED IRON DEFICIENCY ANEMIA TYPE: ICD-10-CM

## 2025-08-12 DIAGNOSIS — N18.31 STAGE 3A CHRONIC KIDNEY DISEASE (HCC): ICD-10-CM

## 2025-08-12 PROCEDURE — 1159F MED LIST DOCD IN RCRD: CPT | Performed by: INTERNAL MEDICINE

## 2025-08-12 PROCEDURE — 99214 OFFICE O/P EST MOD 30 MIN: CPT | Performed by: INTERNAL MEDICINE

## 2025-08-12 PROCEDURE — 3017F COLORECTAL CA SCREEN DOC REV: CPT | Performed by: INTERNAL MEDICINE

## 2025-08-12 PROCEDURE — 1123F ACP DISCUSS/DSCN MKR DOCD: CPT | Performed by: INTERNAL MEDICINE

## 2025-08-12 PROCEDURE — 3074F SYST BP LT 130 MM HG: CPT | Performed by: INTERNAL MEDICINE

## 2025-08-12 PROCEDURE — 3078F DIAST BP <80 MM HG: CPT | Performed by: INTERNAL MEDICINE

## 2025-08-12 PROCEDURE — G8427 DOCREV CUR MEDS BY ELIG CLIN: HCPCS | Performed by: INTERNAL MEDICINE

## 2025-08-12 PROCEDURE — G8400 PT W/DXA NO RESULTS DOC: HCPCS | Performed by: INTERNAL MEDICINE

## 2025-08-12 PROCEDURE — 1036F TOBACCO NON-USER: CPT | Performed by: INTERNAL MEDICINE

## 2025-08-12 PROCEDURE — 1160F RVW MEDS BY RX/DR IN RCRD: CPT | Performed by: INTERNAL MEDICINE

## 2025-08-12 PROCEDURE — G8419 CALC BMI OUT NRM PARAM NOF/U: HCPCS | Performed by: INTERNAL MEDICINE

## 2025-08-12 PROCEDURE — 1090F PRES/ABSN URINE INCON ASSESS: CPT | Performed by: INTERNAL MEDICINE

## 2025-08-12 RX ORDER — LATANOPROST 50 UG/ML
SOLUTION/ DROPS OPHTHALMIC
COMMUNITY
Start: 2025-06-30

## 2025-08-12 RX ORDER — GABAPENTIN 100 MG/1
100 CAPSULE ORAL 2 TIMES DAILY PRN
Qty: 180 CAPSULE | Refills: 0 | Status: SHIPPED | OUTPATIENT
Start: 2025-08-12 | End: 2026-02-08

## 2025-08-12 RX ORDER — OFLOXACIN 3 MG/ML
SOLUTION/ DROPS OPHTHALMIC
COMMUNITY
Start: 2025-07-15

## 2025-08-12 RX ORDER — METHYLPREDNISOLONE 4 MG/1
TABLET ORAL
Qty: 1 KIT | Refills: 0 | Status: SHIPPED | OUTPATIENT
Start: 2025-08-12 | End: 2025-08-18

## 2025-08-12 ASSESSMENT — PATIENT HEALTH QUESTIONNAIRE - PHQ9
1. LITTLE INTEREST OR PLEASURE IN DOING THINGS: NOT AT ALL
SUM OF ALL RESPONSES TO PHQ QUESTIONS 1-9: 0
2. FEELING DOWN, DEPRESSED OR HOPELESS: NOT AT ALL

## 2025-08-13 DIAGNOSIS — J30.9 ALLERGIC RHINITIS, UNSPECIFIED SEASONALITY, UNSPECIFIED TRIGGER: ICD-10-CM

## 2025-08-13 LAB
ANION GAP SERPL CALC-SCNC: 14 MMOL/L (ref 2–14)
BUN SERPL-MCNC: 24 MG/DL (ref 8–23)
BUN/CREAT SERPL: 22 (ref 12–20)
CALCIUM SERPL-MCNC: 9.7 MG/DL (ref 8.8–10.2)
CHLORIDE SERPL-SCNC: 104 MMOL/L (ref 98–107)
CO2 SERPL-SCNC: 23 MMOL/L (ref 20–29)
CREAT SERPL-MCNC: 1.1 MG/DL (ref 0.6–1)
ERYTHROCYTE [DISTWIDTH] IN BLOOD BY AUTOMATED COUNT: 13.2 % (ref 11.5–14.5)
EST. AVERAGE GLUCOSE BLD GHB EST-MCNC: 128 MG/DL
FERRITIN SERPL-MCNC: 68 NG/ML (ref 13–252)
GLUCOSE SERPL-MCNC: 109 MG/DL (ref 65–100)
HBA1C MFR BLD: 6.1 % (ref 4–5.6)
HCT VFR BLD AUTO: 29.8 % (ref 35–47)
HGB BLD-MCNC: 9.5 G/DL (ref 11.5–16)
MCH RBC QN AUTO: 28.7 PG (ref 26–34)
MCHC RBC AUTO-ENTMCNC: 31.9 G/DL (ref 30–36.5)
MCV RBC AUTO: 90 FL (ref 80–99)
NRBC # BLD: 0 K/UL (ref 0–0.01)
NRBC BLD-RTO: 0 PER 100 WBC
PLATELET # BLD AUTO: 313 K/UL (ref 150–400)
PMV BLD AUTO: 9.9 FL (ref 8.9–12.9)
POTASSIUM SERPL-SCNC: 4 MMOL/L (ref 3.5–5.1)
RBC # BLD AUTO: 3.31 M/UL (ref 3.8–5.2)
SODIUM SERPL-SCNC: 141 MMOL/L (ref 136–145)
TSH, 3RD GENERATION: 0.55 UIU/ML (ref 0.27–4.2)
WBC # BLD AUTO: 12.5 K/UL (ref 3.6–11)

## 2025-08-13 RX ORDER — MONTELUKAST SODIUM 10 MG/1
10 TABLET ORAL DAILY
Qty: 90 TABLET | Refills: 0 | Status: SHIPPED | OUTPATIENT
Start: 2025-08-13

## 2025-08-26 RX ORDER — ALBUTEROL SULFATE 90 UG/1
2 AEROSOL, METERED RESPIRATORY (INHALATION) EVERY 6 HOURS PRN
Qty: 18 G | Refills: 3 | Status: SHIPPED | OUTPATIENT
Start: 2025-08-26

## 2025-08-27 ENCOUNTER — TRANSCRIBE ORDERS (OUTPATIENT)
Facility: HOSPITAL | Age: 75
End: 2025-08-27

## 2025-08-27 DIAGNOSIS — M43.16 SPONDYLOLISTHESIS, LUMBAR REGION: Primary | ICD-10-CM

## (undated) DEVICE — SYR 3ML LL TIP 1/10ML GRAD --

## (undated) DEVICE — Device

## (undated) DEVICE — Z DISCONTINUED PER MEDLINE LINE GAS SAMPLING O2/CO2 LNG AD 13 FT NSL W/ TBNG FILTERLINE

## (undated) DEVICE — ELECTRODE,RADIOTRANSLUCENT,FOAM,5PK: Brand: MEDLINE

## (undated) DEVICE — TOWEL 4 PLY TISS 19X30 SUE WHT

## (undated) DEVICE — SNARE ENDOSCP M L240CM W27MM SHTH DIA2.4MM CHN 2.8MM OVL

## (undated) DEVICE — SOLIDIFIER FLD 2OZ 1500CC N DISINF IN BTL DISP SAFESORB

## (undated) DEVICE — SET ADMIN 16ML TBNG L100IN 2 Y INJ SITE IV PIGGY BK DISP

## (undated) DEVICE — NON-REM POLYHESIVE PATIENT RETURN ELECTRODE: Brand: VALLEYLAB

## (undated) DEVICE — 1200 GUARD II KIT W/5MM TUBE W/O VAC TUBE: Brand: GUARDIAN

## (undated) DEVICE — TRAP,MUCUS SPECIMEN, 80CC: Brand: MEDLINE

## (undated) DEVICE — NEONATAL-ADULT SPO2 SENSOR: Brand: NELLCOR

## (undated) DEVICE — BASIN EMSIS 16OZ GRAPHITE PLAS KID SHP MOLD GRAD FOR ORAL

## (undated) DEVICE — CATH IV AUTOGRD BC PNK 20GA 25 -- INSYTE

## (undated) DEVICE — STRAINER URIN CALC RNL MSH -- CONVERT TO ITEM 357634

## (undated) DEVICE — SYR 10ML LUER LOK 1/5ML GRAD --

## (undated) DEVICE — NEEDLE HYPO 18GA L1.5IN PNK S STL HUB POLYPR SHLD REG BVL

## (undated) DEVICE — CONTAINER SPEC 20 ML LID NEUT BUFF FORMALIN 10 % POLYPR STS